# Patient Record
Sex: MALE | Race: WHITE | Employment: OTHER | URBAN - METROPOLITAN AREA
[De-identification: names, ages, dates, MRNs, and addresses within clinical notes are randomized per-mention and may not be internally consistent; named-entity substitution may affect disease eponyms.]

---

## 2017-01-27 ENCOUNTER — OFFICE VISIT (OUTPATIENT)
Dept: FAMILY MEDICINE CLINIC | Age: 77
End: 2017-01-27

## 2017-01-27 ENCOUNTER — TELEPHONE (OUTPATIENT)
Dept: FAMILY MEDICINE CLINIC | Age: 77
End: 2017-01-27

## 2017-01-27 VITALS
HEART RATE: 63 BPM | DIASTOLIC BLOOD PRESSURE: 82 MMHG | OXYGEN SATURATION: 99 % | BODY MASS INDEX: 32.79 KG/M2 | WEIGHT: 241.8 LBS | RESPIRATION RATE: 15 BRPM | SYSTOLIC BLOOD PRESSURE: 124 MMHG

## 2017-01-27 DIAGNOSIS — E11.59 TYPE 2 DIABETES MELLITUS WITH VASCULAR DISEASE (HCC): Primary | ICD-10-CM

## 2017-01-27 DIAGNOSIS — E66.9 OBESITY (BMI 30.0-34.9): ICD-10-CM

## 2017-01-27 DIAGNOSIS — E78.5 HYPERLIPIDEMIA, UNSPECIFIED HYPERLIPIDEMIA TYPE: ICD-10-CM

## 2017-01-27 DIAGNOSIS — L72.3 SEBACEOUS CYST: ICD-10-CM

## 2017-01-27 DIAGNOSIS — I10 ESSENTIAL HYPERTENSION, BENIGN: ICD-10-CM

## 2017-01-27 DIAGNOSIS — I25.110 CORONARY ARTERY DISEASE INVOLVING NATIVE CORONARY ARTERY OF NATIVE HEART WITH UNSTABLE ANGINA PECTORIS (HCC): ICD-10-CM

## 2017-01-27 LAB — HBA1C MFR BLD: 6.5 %

## 2017-01-27 PROCEDURE — 99214 OFFICE O/P EST MOD 30 MIN: CPT | Performed by: FAMILY MEDICINE

## 2017-01-27 PROCEDURE — 3288F FALL RISK ASSESSMENT DOCD: CPT | Performed by: FAMILY MEDICINE

## 2017-01-27 PROCEDURE — 83036 HEMOGLOBIN GLYCOSYLATED A1C: CPT | Performed by: FAMILY MEDICINE

## 2017-01-27 PROCEDURE — G8510 SCR DEP NEG, NO PLAN REQD: HCPCS | Performed by: FAMILY MEDICINE

## 2017-01-27 ASSESSMENT — PATIENT HEALTH QUESTIONNAIRE - PHQ9
SUM OF ALL RESPONSES TO PHQ QUESTIONS 1-9: 0
2. FEELING DOWN, DEPRESSED OR HOPELESS: 0
SUM OF ALL RESPONSES TO PHQ9 QUESTIONS 1 & 2: 0
1. LITTLE INTEREST OR PLEASURE IN DOING THINGS: 0

## 2017-02-06 ENCOUNTER — TELEPHONE (OUTPATIENT)
Dept: FAMILY MEDICINE CLINIC | Age: 77
End: 2017-02-06

## 2017-02-09 RX ORDER — AMLODIPINE BESYLATE 5 MG/1
TABLET ORAL
Qty: 90 TABLET | Refills: 0 | Status: SHIPPED | OUTPATIENT
Start: 2017-02-09 | End: 2017-05-09 | Stop reason: SDUPTHER

## 2017-02-13 DIAGNOSIS — E11.59 TYPE 2 DIABETES MELLITUS WITH VASCULAR DISEASE (HCC): ICD-10-CM

## 2017-02-21 RX ORDER — ATORVASTATIN CALCIUM 80 MG/1
TABLET, FILM COATED ORAL
Qty: 30 TABLET | Refills: 10 | Status: SHIPPED | OUTPATIENT
Start: 2017-02-21 | End: 2017-03-14 | Stop reason: SDUPTHER

## 2017-03-14 ENCOUNTER — TELEPHONE (OUTPATIENT)
Dept: CARDIOLOGY CLINIC | Age: 77
End: 2017-03-14

## 2017-03-14 RX ORDER — ATORVASTATIN CALCIUM 80 MG/1
80 TABLET, FILM COATED ORAL DAILY
Qty: 90 TABLET | Refills: 3
Start: 2017-03-14 | End: 2017-12-12 | Stop reason: SDUPTHER

## 2017-03-27 RX ORDER — LISINOPRIL 20 MG/1
TABLET ORAL
Qty: 90 TABLET | Refills: 3 | Status: SHIPPED | OUTPATIENT
Start: 2017-03-27 | End: 2017-07-13 | Stop reason: SDUPTHER

## 2017-04-06 ENCOUNTER — TELEPHONE (OUTPATIENT)
Dept: FAMILY MEDICINE CLINIC | Age: 77
End: 2017-04-06

## 2017-05-09 RX ORDER — AMLODIPINE BESYLATE 5 MG/1
TABLET ORAL
Qty: 90 TABLET | Refills: 0 | Status: SHIPPED | OUTPATIENT
Start: 2017-05-09 | End: 2017-05-18 | Stop reason: SDUPTHER

## 2017-05-18 ENCOUNTER — OFFICE VISIT (OUTPATIENT)
Dept: CARDIOLOGY CLINIC | Age: 77
End: 2017-05-18

## 2017-05-18 VITALS
DIASTOLIC BLOOD PRESSURE: 90 MMHG | HEART RATE: 70 BPM | SYSTOLIC BLOOD PRESSURE: 152 MMHG | WEIGHT: 246 LBS | BODY MASS INDEX: 33.32 KG/M2 | HEIGHT: 72 IN

## 2017-05-18 DIAGNOSIS — Z98.890 STATUS POST AAA (ABDOMINAL AORTIC ANEURYSM) REPAIR: ICD-10-CM

## 2017-05-18 DIAGNOSIS — I10 ESSENTIAL HYPERTENSION, BENIGN: Primary | ICD-10-CM

## 2017-05-18 DIAGNOSIS — Z86.79 STATUS POST AAA (ABDOMINAL AORTIC ANEURYSM) REPAIR: ICD-10-CM

## 2017-05-18 DIAGNOSIS — I50.22 CHRONIC SYSTOLIC HEART FAILURE (HCC): ICD-10-CM

## 2017-05-18 DIAGNOSIS — E78.2 MIXED HYPERLIPIDEMIA: ICD-10-CM

## 2017-05-18 DIAGNOSIS — I25.110 CORONARY ARTERY DISEASE INVOLVING NATIVE CORONARY ARTERY OF NATIVE HEART WITH UNSTABLE ANGINA PECTORIS (HCC): ICD-10-CM

## 2017-05-18 DIAGNOSIS — I77.9 CAROTID DISEASE, BILATERAL (HCC): ICD-10-CM

## 2017-05-18 PROCEDURE — 99214 OFFICE O/P EST MOD 30 MIN: CPT | Performed by: INTERNAL MEDICINE

## 2017-05-18 RX ORDER — METOPROLOL SUCCINATE 25 MG/1
25 TABLET, EXTENDED RELEASE ORAL DAILY
Qty: 30 TABLET | Refills: 3 | Status: SHIPPED | OUTPATIENT
Start: 2017-05-18 | End: 2017-07-13 | Stop reason: SDUPTHER

## 2017-05-18 RX ORDER — NITROGLYCERIN 0.4 MG/1
0.4 TABLET SUBLINGUAL EVERY 5 MIN PRN
Qty: 25 TABLET | Refills: 3 | Status: SHIPPED | OUTPATIENT
Start: 2017-05-18 | End: 2020-03-09 | Stop reason: SDUPTHER

## 2017-05-18 RX ORDER — AMLODIPINE BESYLATE 5 MG/1
5 TABLET ORAL DAILY
Qty: 90 TABLET | Refills: 3 | Status: SHIPPED | OUTPATIENT
Start: 2017-05-18 | End: 2018-05-24 | Stop reason: SDUPTHER

## 2017-06-02 ENCOUNTER — TELEPHONE (OUTPATIENT)
Dept: FAMILY MEDICINE CLINIC | Age: 77
End: 2017-06-02

## 2017-06-13 ENCOUNTER — TELEPHONE (OUTPATIENT)
Dept: FAMILY MEDICINE CLINIC | Age: 77
End: 2017-06-13

## 2017-06-17 ENCOUNTER — HOSPITAL ENCOUNTER (OUTPATIENT)
Dept: OTHER | Age: 77
Discharge: HOME | End: 2017-06-17
Attending: INTERNAL MEDICINE | Admitting: INTERNAL MEDICINE

## 2017-06-17 DIAGNOSIS — E78.2 MIXED HYPERLIPIDEMIA: ICD-10-CM

## 2017-06-17 LAB
ALBUMIN SERPL-MCNC: 4.2 G/DL (ref 3.4–5)
ALP BLD-CCNC: 111 U/L (ref 40–129)
ALT SERPL-CCNC: 16 U/L (ref 10–40)
ANION GAP SERPL CALCULATED.3IONS-SCNC: 13 MMOL/L (ref 3–16)
AST SERPL-CCNC: 16 U/L (ref 15–37)
BILIRUB SERPL-MCNC: 0.6 MG/DL (ref 0–1)
BILIRUBIN DIRECT: <0.2 MG/DL (ref 0–0.3)
BILIRUBIN, INDIRECT: NORMAL MG/DL (ref 0–1)
BUN BLDV-MCNC: 16 MG/DL (ref 7–20)
CALCIUM SERPL-MCNC: 9.5 MG/DL (ref 8.3–10.6)
CHLORIDE BLD-SCNC: 106 MMOL/L (ref 99–110)
CHOLESTEROL, TOTAL: 137 MG/DL (ref 0–199)
CO2: 26 MMOL/L (ref 21–32)
CREAT SERPL-MCNC: 1 MG/DL (ref 0.8–1.3)
GFR AFRICAN AMERICAN: >60
GFR NON-AFRICAN AMERICAN: >60
GLUCOSE BLD-MCNC: 118 MG/DL (ref 70–99)
HDLC SERPL-MCNC: 38 MG/DL (ref 40–60)
LDL CHOLESTEROL CALCULATED: 75 MG/DL
PHOSPHORUS: 3 MG/DL (ref 2.5–4.9)
POTASSIUM SERPL-SCNC: 4.5 MMOL/L (ref 3.5–5.1)
SODIUM BLD-SCNC: 145 MMOL/L (ref 136–145)
TOTAL PROTEIN: 7.3 G/DL (ref 6.4–8.2)
TRIGL SERPL-MCNC: 118 MG/DL (ref 0–150)
VLDLC SERPL CALC-MCNC: 24 MG/DL

## 2017-06-20 ENCOUNTER — TELEPHONE (OUTPATIENT)
Dept: FAMILY MEDICINE CLINIC | Age: 77
End: 2017-06-20

## 2017-07-13 ENCOUNTER — OFFICE VISIT (OUTPATIENT)
Dept: CARDIOLOGY CLINIC | Age: 77
End: 2017-07-13

## 2017-07-13 VITALS
SYSTOLIC BLOOD PRESSURE: 160 MMHG | DIASTOLIC BLOOD PRESSURE: 102 MMHG | HEIGHT: 72 IN | BODY MASS INDEX: 33.05 KG/M2 | HEART RATE: 72 BPM | OXYGEN SATURATION: 97 % | WEIGHT: 244 LBS

## 2017-07-13 DIAGNOSIS — I25.110 CORONARY ARTERY DISEASE INVOLVING NATIVE CORONARY ARTERY OF NATIVE HEART WITH UNSTABLE ANGINA PECTORIS (HCC): Primary | ICD-10-CM

## 2017-07-13 DIAGNOSIS — I10 ESSENTIAL HYPERTENSION, BENIGN: ICD-10-CM

## 2017-07-13 DIAGNOSIS — E78.2 MIXED HYPERLIPIDEMIA: ICD-10-CM

## 2017-07-13 PROCEDURE — 99214 OFFICE O/P EST MOD 30 MIN: CPT | Performed by: NURSE PRACTITIONER

## 2017-07-13 RX ORDER — LISINOPRIL 40 MG/1
40 TABLET ORAL DAILY
Qty: 90 TABLET | Refills: 3 | Status: SHIPPED | OUTPATIENT
Start: 2017-07-13 | End: 2017-11-22 | Stop reason: SDUPTHER

## 2017-07-13 RX ORDER — METOPROLOL SUCCINATE 25 MG/1
25 TABLET, EXTENDED RELEASE ORAL DAILY
Qty: 90 TABLET | Refills: 3 | Status: SHIPPED | OUTPATIENT
Start: 2017-07-13 | End: 2018-05-24 | Stop reason: SDUPTHER

## 2017-08-10 ENCOUNTER — OFFICE VISIT (OUTPATIENT)
Dept: FAMILY MEDICINE CLINIC | Age: 77
End: 2017-08-10

## 2017-08-10 VITALS
SYSTOLIC BLOOD PRESSURE: 126 MMHG | WEIGHT: 245.2 LBS | HEART RATE: 61 BPM | RESPIRATION RATE: 15 BRPM | BODY MASS INDEX: 33.26 KG/M2 | DIASTOLIC BLOOD PRESSURE: 80 MMHG | OXYGEN SATURATION: 96 %

## 2017-08-10 DIAGNOSIS — I10 ESSENTIAL HYPERTENSION, BENIGN: ICD-10-CM

## 2017-08-10 DIAGNOSIS — E11.59 TYPE 2 DIABETES MELLITUS WITH VASCULAR DISEASE (HCC): Primary | ICD-10-CM

## 2017-08-10 DIAGNOSIS — D22.9 NUMEROUS MOLES: ICD-10-CM

## 2017-08-10 DIAGNOSIS — E78.5 HYPERLIPIDEMIA, UNSPECIFIED HYPERLIPIDEMIA TYPE: ICD-10-CM

## 2017-08-10 DIAGNOSIS — E66.9 OBESITY (BMI 30.0-34.9): ICD-10-CM

## 2017-08-10 DIAGNOSIS — I25.110 CORONARY ARTERY DISEASE INVOLVING NATIVE CORONARY ARTERY OF NATIVE HEART WITH UNSTABLE ANGINA PECTORIS (HCC): ICD-10-CM

## 2017-08-10 DIAGNOSIS — R42 VESTIBULAR DIZZINESS INVOLVING LEFT INNER EAR: ICD-10-CM

## 2017-08-10 LAB — HBA1C MFR BLD: 6.9 %

## 2017-08-10 PROCEDURE — 99214 OFFICE O/P EST MOD 30 MIN: CPT | Performed by: FAMILY MEDICINE

## 2017-08-10 PROCEDURE — 83036 HEMOGLOBIN GLYCOSYLATED A1C: CPT | Performed by: FAMILY MEDICINE

## 2017-09-25 RX ORDER — METOPROLOL SUCCINATE 25 MG/1
TABLET, EXTENDED RELEASE ORAL
Qty: 30 TABLET | Refills: 11 | Status: SHIPPED | OUTPATIENT
Start: 2017-09-25 | End: 2018-02-14 | Stop reason: ALTCHOICE

## 2017-11-22 ENCOUNTER — OFFICE VISIT (OUTPATIENT)
Dept: CARDIOLOGY CLINIC | Age: 77
End: 2017-11-22

## 2017-11-22 VITALS
HEART RATE: 80 BPM | HEIGHT: 72 IN | SYSTOLIC BLOOD PRESSURE: 94 MMHG | WEIGHT: 244.2 LBS | DIASTOLIC BLOOD PRESSURE: 60 MMHG | BODY MASS INDEX: 33.08 KG/M2

## 2017-11-22 DIAGNOSIS — Z86.79 STATUS POST AAA (ABDOMINAL AORTIC ANEURYSM) REPAIR: ICD-10-CM

## 2017-11-22 DIAGNOSIS — Z98.890 STATUS POST AAA (ABDOMINAL AORTIC ANEURYSM) REPAIR: ICD-10-CM

## 2017-11-22 DIAGNOSIS — I10 ESSENTIAL HYPERTENSION, BENIGN: ICD-10-CM

## 2017-11-22 DIAGNOSIS — I25.110 CORONARY ARTERY DISEASE INVOLVING NATIVE CORONARY ARTERY OF NATIVE HEART WITH UNSTABLE ANGINA PECTORIS (HCC): Primary | ICD-10-CM

## 2017-11-22 DIAGNOSIS — I77.9 CAROTID DISEASE, BILATERAL (HCC): ICD-10-CM

## 2017-11-22 PROCEDURE — 99214 OFFICE O/P EST MOD 30 MIN: CPT | Performed by: INTERNAL MEDICINE

## 2017-11-22 PROCEDURE — 1036F TOBACCO NON-USER: CPT | Performed by: INTERNAL MEDICINE

## 2017-11-22 PROCEDURE — 1123F ACP DISCUSS/DSCN MKR DOCD: CPT | Performed by: INTERNAL MEDICINE

## 2017-11-22 PROCEDURE — G8417 CALC BMI ABV UP PARAM F/U: HCPCS | Performed by: INTERNAL MEDICINE

## 2017-11-22 PROCEDURE — G8484 FLU IMMUNIZE NO ADMIN: HCPCS | Performed by: INTERNAL MEDICINE

## 2017-11-22 PROCEDURE — 4040F PNEUMOC VAC/ADMIN/RCVD: CPT | Performed by: INTERNAL MEDICINE

## 2017-11-22 PROCEDURE — G8598 ASA/ANTIPLAT THER USED: HCPCS | Performed by: INTERNAL MEDICINE

## 2017-11-22 PROCEDURE — G8427 DOCREV CUR MEDS BY ELIG CLIN: HCPCS | Performed by: INTERNAL MEDICINE

## 2017-11-22 RX ORDER — LISINOPRIL 20 MG/1
20 TABLET ORAL DAILY
Qty: 90 TABLET | Refills: 3 | Status: SHIPPED | OUTPATIENT
Start: 2017-11-22 | End: 2018-05-24 | Stop reason: SDUPTHER

## 2017-11-22 NOTE — COMMUNICATION BODY
Disease in his brother, father, and sister. Home Medications:  Prior to Admission medications    Medication Sig Start Date End Date Taking? Authorizing Provider   metFORMIN (GLUCOPHAGE) 500 MG tablet Take 1 tablet by mouth 2 times daily (with meals) 8/10/17  Yes Morelia Fuentes MD   lisinopril (PRINIVIL;ZESTRIL) 40 MG tablet Take 1 tablet by mouth daily 7/13/17  Yes Shi Palumbo NP   metoprolol succinate (TOPROL XL) 25 MG extended release tablet Take 1 tablet by mouth daily 7/13/17  Yes Shi Palumbo NP   amLODIPine (NORVASC) 5 MG tablet Take 1 tablet by mouth daily 5/18/17  Yes Kylah Wagner MD   nitroGLYCERIN (NITROSTAT) 0.4 MG SL tablet Place 1 tablet under the tongue every 5 minutes as needed for Chest pain 5/18/17  Yes Kylah Wagner MD   atorvastatin (LIPITOR) 80 MG tablet Take 1 tablet by mouth daily 3/14/17  Yes Kylah Wagner MD   aspirin 81 MG tablet Take 81 mg by mouth daily   Yes Historical Provider, MD   clopidogrel (PLAVIX) 75 MG tablet TAKE ONE TABLET BY MOUTH DAILY 12/18/16  Yes Kylah Wagner MD   ibuprofen (ADVIL) 200 MG CAPS Take 2 capsules by mouth 3 times daily   Yes Historical Provider, MD   metoprolol succinate (TOPROL XL) 25 MG extended release tablet TAKE 1 TABLET BY MOUTH ONE TIME A DAY  9/25/17   Kylah Wagner MD   Blood Glucose Monitoring Suppl (ACCU-CHEK THOMAS PLUS) W/DEVICE KIT  10/7/15   Historical Provider, MD   ASSURE COMFORT LANCETS 30G [address] [facility]  10/7/15   Historical Provider, MD        Allergies:  Latex and Iodine     Review of Systems:   · Constitutional: there has been no unanticipated weight loss. There's been no change in energy level, sleep pattern, or activity level. · Eyes: No visual changes or diplopia. No scleral icterus. · ENT: No Headaches, hearing loss or vertigo. No mouth sores or sore throat. · Cardiovascular: Reviewed in HPI  · Respiratory: No cough or wheezing, no sputum production. No hematemesis.     · Gastrointestinal: No abdominal pain,

## 2017-11-22 NOTE — PATIENT INSTRUCTIONS
1.  Decrease lisinopril to 20 mg daily  2. Instructed to call for any change in status  3.   Follow up in few weeks with NP

## 2017-11-22 NOTE — PROGRESS NOTES
[facility]   Cardiac Consultation    Referring Provider:  [address] [place] [address]     Chief Complaint   Patient presents with    6 Month Follow-Up    Hypertension    Coronary Artery Disease    Outpatient Follow Up Note    History of Present Illness:   Ty Yang is 76 y.o.male who presents today with a history of CAD s/p CABG '02, HTN, hyperlipidemia, AAA s/p repair '09 and carotid stenosis s/p LCE '06. Today, he is doing well. He still has random episodes of chest pain, no increase in frequency or severity. He does have episodes of dizziness when he stands too quick. There is no associated shortness of breath, palpitations. Past Medical History:   has a past medical history of Acquired spondylolisthesis; Arthritis; CAD (coronary artery disease); Carotid disease, bilateral ([address] Cholelithiasis; Chronic systolic heart failure ([address] Diabetes mellitus type II; Essential hypertension; Hyperlipidemia; Obesity (BMI 30.0-34.9); Spinal stenosis of lumbar region; and Status post AAA (abdominal aortic aneurysm) repair. Surgical History:   has a past surgical history that includes Umbilical hernia repair (1987); Hip Arthroplasty (Left, 1999); Carotid endarterectomy (Left, 2006); AAA repair, endovascular (2009); Coronary artery bypass graft (2002); Vasectomy (1975); cyst removal (Right, 1997); Thyroid surgery (2012); Retinal detachment surgery (Left, 2004); Coronary angioplasty with stent (2009); Rotator cuff repair (Left, 2/24/14); Inguinal hernia repair (Left, 1985); Elbow surgery (Left, 1984); and Total knee arthroplasty (Right, 09/15/2015). Social History:   reports that he quit smoking about 46 years ago. His smoking use included Cigarettes. He has a 45.00 pack-year smoking history. He has never used smokeless tobacco. He reports that he does not drink alcohol or use drugs.      Family History:  family history includes Breast Cancer in his sister; Cancer in his brother; Heart Disease in his brother, father, and sister. Home Medications:  Prior to Admission medications    Medication Sig Start Date End Date Taking? Authorizing Provider   metFORMIN (GLUCOPHAGE) 500 MG tablet Take 1 tablet by mouth 2 times daily (with meals) 8/10/17  Yes Temitope Arnold MD   lisinopril (PRINIVIL;ZESTRIL) 40 MG tablet Take 1 tablet by mouth daily 7/13/17  Yes Gabe Craft NP   metoprolol succinate (TOPROL XL) 25 MG extended release tablet Take 1 tablet by mouth daily 7/13/17  Yes Gabe Craft NP   amLODIPine (NORVASC) 5 MG tablet Take 1 tablet by mouth daily 5/18/17  Yes Paty Zavala MD   nitroGLYCERIN (NITROSTAT) 0.4 MG SL tablet Place 1 tablet under the tongue every 5 minutes as needed for Chest pain 5/18/17  Yes Paty Zavala MD   atorvastatin (LIPITOR) 80 MG tablet Take 1 tablet by mouth daily 3/14/17  Yes Paty Zavala MD   aspirin 81 MG tablet Take 81 mg by mouth daily   Yes Historical Provider, MD   clopidogrel (PLAVIX) 75 MG tablet TAKE ONE TABLET BY MOUTH DAILY 12/18/16  Yes Paty Zavala MD   ibuprofen (ADVIL) 200 MG CAPS Take 2 capsules by mouth 3 times daily   Yes Historical Provider, MD   metoprolol succinate (TOPROL XL) 25 MG extended release tablet TAKE 1 TABLET BY MOUTH ONE TIME A DAY  9/25/17   Paty Zavala MD   Blood Glucose Monitoring Suppl (ACCU-CHEK THOMAS PLUS) W/DEVICE KIT  10/7/15   Historical Provider, MD   ASSURE COMFORT LANCETS 30G [address] [facility]  10/7/15   Historical Provider, MD        Allergies:  Latex and Iodine     Review of Systems:   · Constitutional: there has been no unanticipated weight loss. There's been no change in energy level, sleep pattern, or activity level. · Eyes: No visual changes or diplopia. No scleral icterus. · ENT: No Headaches, hearing loss or vertigo. No mouth sores or sore throat. · Cardiovascular: Reviewed in HPI  · Respiratory: No cough or wheezing, no sputum production. No hematemesis.     · Gastrointestinal: No abdominal pain, appetite loss, blood in stools. No change in bowel or bladder habits. · Genitourinary: No dysuria, trouble voiding, or hematuria. · Musculoskeletal:  No gait disturbance, weakness or joint complaints. · Integumentary: No rash or pruritis. · Neurological: No headache, diplopia, change in muscle strength, numbness or tingling. No change in gait, balance, coordination, mood, affect, memory, mentation, behavior. · Psychiatric: No anxiety, no depression. · Endocrine: No malaise, fatigue or temperature intolerance. No excessive thirst, fluid intake, or urination. No tremor. · Hematologic/Lymphatic: No abnormal bruising or bleeding, blood clots or swollen lymph nodes. · Allergic/Immunologic: No nasal congestion or hives. Physical Examination:    Vitals:    11/22/17 1255   BP: 94/60   Pulse: 80        Constitutional and General Appearance: NAD  Skin:good turgor,intact without lesions  HEENT: EOMI ,normal  Neck:no JVD    Respiratory:  · Normal excursion and expansion without use of accessory muscles  · Resp Auscultation: Normal breath sounds without dullness  Cardiovascular:  · The apical impulses not displaced  · Heart tones are crisp and normal  · Cervical veins are not engorged  · The carotid upstroke is normal in amplitude and contour without delay or bruit  · Peripheral pulses are symmetrical and full  · There is no clubbing, cyanosis of the extremities. · No edema  · Femoral Arteries: 2+ and equal  · Pedal Pulses: 2+ and equal   Abdomen:  · No masses or tenderness  · Liver/Spleen: No Abnormalities Noted  Neurological/Psychiatric:  · Alert and oriented in all spheres  · Moves all extremities well  · Exhibits normal gait balance and coordination  · No abnormalities of mood, affect, memory, mentation, or behavior are noted      Assessment/Plan:    1. CAD--CAD s/p CABG '02   8/15--myoview-small sized lateral fixed defect c/w infarction in the territroy of the mid and distal LCX-stable.  Will add toprol xl 25 mg

## 2017-12-04 ENCOUNTER — TELEPHONE (OUTPATIENT)
Dept: FAMILY MEDICINE CLINIC | Age: 77
End: 2017-12-04

## 2017-12-04 DIAGNOSIS — D49.2 EAR TUMORS: Primary | ICD-10-CM

## 2017-12-04 NOTE — TELEPHONE ENCOUNTER
Please place a referral in Epic and return this encounter to the front office referral team for processing.     REFER TO: Issa Perla SPECIALITY:  Dermatologist  [phone] fax [phone]  DATE OF SERVICE: 09/08/2017 please back date  REASON FOR VISIT:  Dx code D 49.2 L 81.4  D 18.01 L 57.8 D 48.5 L 57.0 L 82.1 Z 12.83  PATIENT INSURANCE:  [address] [employer]

## 2017-12-06 ENCOUNTER — TELEPHONE (OUTPATIENT)
Dept: CARDIOLOGY CLINIC | Age: 77
End: 2017-12-06

## 2017-12-06 VITALS — SYSTOLIC BLOOD PRESSURE: 138 MMHG | DIASTOLIC BLOOD PRESSURE: 76 MMHG

## 2017-12-12 ENCOUNTER — TELEPHONE (OUTPATIENT)
Dept: FAMILY MEDICINE CLINIC | Age: 77
End: 2017-12-12

## 2017-12-12 RX ORDER — ATORVASTATIN CALCIUM 80 MG/1
80 TABLET, FILM COATED ORAL DAILY
Qty: 90 TABLET | Refills: 3 | Status: SHIPPED | OUTPATIENT
Start: 2017-12-12 | End: 2018-05-24 | Stop reason: SDUPTHER

## 2017-12-18 ENCOUNTER — TELEPHONE (OUTPATIENT)
Dept: FAMILY MEDICINE CLINIC | Age: 77
End: 2017-12-18

## 2017-12-18 DIAGNOSIS — L98.9 SKIN LESION: Primary | ICD-10-CM

## 2017-12-18 NOTE — TELEPHONE ENCOUNTER
Looks like there was a referral placed. But not particularly for that doctor. He may be another doc in the group. Sada Reagan Referral says in progress. So I think it is still processing.

## 2017-12-18 NOTE — TELEPHONE ENCOUNTER
Veena Sparrow from the dermatologist office [phone] called and it checking on the referral to [place] Date of service 09/08/2017.  Please advise

## 2017-12-26 RX ORDER — CLOPIDOGREL BISULFATE 75 MG/1
TABLET ORAL
Qty: 90 TABLET | Refills: 3 | Status: SHIPPED | OUTPATIENT
Start: 2017-12-26 | End: 2018-05-24 | Stop reason: SDUPTHER

## 2018-02-07 ENCOUNTER — TELEPHONE (OUTPATIENT)
Dept: FAMILY MEDICINE CLINIC | Age: 78
End: 2018-02-07

## 2018-02-14 ENCOUNTER — OFFICE VISIT (OUTPATIENT)
Dept: FAMILY MEDICINE CLINIC | Age: 78
End: 2018-02-14

## 2018-02-14 VITALS
BODY MASS INDEX: 32.91 KG/M2 | HEART RATE: 105 BPM | DIASTOLIC BLOOD PRESSURE: 80 MMHG | OXYGEN SATURATION: 94 % | RESPIRATION RATE: 15 BRPM | SYSTOLIC BLOOD PRESSURE: 124 MMHG | WEIGHT: 243 LBS | HEIGHT: 72 IN

## 2018-02-14 DIAGNOSIS — L08.9 INFECTED SEBACEOUS CYST: Primary | ICD-10-CM

## 2018-02-14 DIAGNOSIS — L72.3 INFECTED SEBACEOUS CYST: Primary | ICD-10-CM

## 2018-02-14 PROCEDURE — G8417 CALC BMI ABV UP PARAM F/U: HCPCS | Performed by: FAMILY MEDICINE

## 2018-02-14 PROCEDURE — G8484 FLU IMMUNIZE NO ADMIN: HCPCS | Performed by: FAMILY MEDICINE

## 2018-02-14 PROCEDURE — G8598 ASA/ANTIPLAT THER USED: HCPCS | Performed by: FAMILY MEDICINE

## 2018-02-14 PROCEDURE — G8427 DOCREV CUR MEDS BY ELIG CLIN: HCPCS | Performed by: FAMILY MEDICINE

## 2018-02-14 PROCEDURE — 99214 OFFICE O/P EST MOD 30 MIN: CPT | Performed by: FAMILY MEDICINE

## 2018-02-14 PROCEDURE — 10060 I&D ABSCESS SIMPLE/SINGLE: CPT | Performed by: FAMILY MEDICINE

## 2018-02-14 PROCEDURE — 4040F PNEUMOC VAC/ADMIN/RCVD: CPT | Performed by: FAMILY MEDICINE

## 2018-02-14 PROCEDURE — 1036F TOBACCO NON-USER: CPT | Performed by: FAMILY MEDICINE

## 2018-02-14 PROCEDURE — 1123F ACP DISCUSS/DSCN MKR DOCD: CPT | Performed by: FAMILY MEDICINE

## 2018-02-14 RX ORDER — LIDOCAINE HYDROCHLORIDE 10 MG/ML
3 INJECTION, SOLUTION EPIDURAL; INFILTRATION; INTRACAUDAL; PERINEURAL ONCE
Status: DISCONTINUED | OUTPATIENT
Start: 2018-02-14 | End: 2020-11-24 | Stop reason: HOSPADM

## 2018-02-14 RX ORDER — CLINDAMYCIN HYDROCHLORIDE 300 MG/1
300 CAPSULE ORAL 3 TIMES DAILY
Qty: 30 CAPSULE | Refills: 0 | Status: SHIPPED | OUTPATIENT
Start: 2018-02-14 | End: 2018-02-24

## 2018-02-14 NOTE — PROGRESS NOTES
Sig Dispense Refill    clindamycin (CLEOCIN) 300 MG capsule Take 1 capsule by mouth 3 times daily for 10 days 30 capsule 0    clopidogrel (PLAVIX) 75 MG tablet TAKE 1 TABLET BY MOUTH DAILY 90 tablet 3    atorvastatin (LIPITOR) 80 MG tablet Take 1 tablet by mouth daily 90 tablet 3    lisinopril (PRINIVIL;ZESTRIL) 20 MG tablet Take 1 tablet by mouth daily 90 tablet 3    metFORMIN (GLUCOPHAGE) 500 MG tablet Take 1 tablet by mouth 2 times daily (with meals) 60 tablet 11    metoprolol succinate (TOPROL XL) 25 MG extended release tablet Take 1 tablet by mouth daily 90 tablet 3    amLODIPine (NORVASC) 5 MG tablet Take 1 tablet by mouth daily 90 tablet 3    nitroGLYCERIN (NITROSTAT) 0.4 MG SL tablet Place 1 tablet under the tongue every 5 minutes as needed for Chest pain 25 tablet 3    aspirin 81 MG tablet Take 81 mg by mouth daily      ibuprofen (ADVIL) 200 MG CAPS Take 2 capsules by mouth 3 times daily      Blood Glucose Monitoring Suppl (ACCU-CHEK THOMAS PLUS) W/DEVICE KIT       ASSURE COMFORT LANCETS 30G [facility]        Current Facility-Administered Medications   Medication Dose Route Frequency Provider Last Rate Last Dose    lidocaine PF 1 % injection 3 mL  3 mL Intradermal Once Jordyn Necessary, MD           Allergies   Allergen Reactions    Latex Swelling and Rash     Blisters. Pt reacts to a variety of products, including balloons, rubber bands, gloves, foam pillows, adhesive tape, ACE bandages.  Iodine        Review of Systems   No vomiting, no SOB    Vitals:  /80 (Site: Left Arm, Position: Sitting, Cuff Size: Large Adult)   Pulse 105   Resp 15   Ht 6' (1.829 m)   Wt 243 lb (110.2 kg)   SpO2 94%   BMI 32.96 kg/m²     Physical Exam   General:  Well-appearing, NAD, alert, non-toxic  HEENT:  Normocephalic, atraumatic. CHEST/LUNGS: No dyspnea  EXTREMETIES: Normal movement of all extremities. No edema. No joint swelling.   SKIN:  +erythematous nodule on upper back on R side lateral to spine, PM

## 2018-02-16 ENCOUNTER — OFFICE VISIT (OUTPATIENT)
Dept: SURGERY | Age: 78
End: 2018-02-16

## 2018-02-16 ENCOUNTER — SURG/PROC ORDERS (OUTPATIENT)
Dept: SURGERY | Age: 78
End: 2018-02-16

## 2018-02-16 VITALS
BODY MASS INDEX: 33.18 KG/M2 | WEIGHT: 245 LBS | HEIGHT: 72 IN | SYSTOLIC BLOOD PRESSURE: 150 MMHG | DIASTOLIC BLOOD PRESSURE: 90 MMHG

## 2018-02-16 DIAGNOSIS — L08.9 INFECTED SEBACEOUS CYST: Primary | ICD-10-CM

## 2018-02-16 DIAGNOSIS — L72.3 INFECTED SEBACEOUS CYST: Primary | ICD-10-CM

## 2018-02-16 PROCEDURE — G8417 CALC BMI ABV UP PARAM F/U: HCPCS | Performed by: SURGERY

## 2018-02-16 PROCEDURE — G8427 DOCREV CUR MEDS BY ELIG CLIN: HCPCS | Performed by: SURGERY

## 2018-02-16 PROCEDURE — G8598 ASA/ANTIPLAT THER USED: HCPCS | Performed by: SURGERY

## 2018-02-16 PROCEDURE — 1036F TOBACCO NON-USER: CPT | Performed by: SURGERY

## 2018-02-16 PROCEDURE — 99203 OFFICE O/P NEW LOW 30 MIN: CPT | Performed by: SURGERY

## 2018-02-16 PROCEDURE — 1123F ACP DISCUSS/DSCN MKR DOCD: CPT | Performed by: SURGERY

## 2018-02-16 PROCEDURE — 4040F PNEUMOC VAC/ADMIN/RCVD: CPT | Performed by: SURGERY

## 2018-02-16 PROCEDURE — G8484 FLU IMMUNIZE NO ADMIN: HCPCS | Performed by: SURGERY

## 2018-02-16 RX ORDER — SODIUM CHLORIDE 0.9 % (FLUSH) 0.9 %
10 SYRINGE (ML) INJECTION EVERY 12 HOURS SCHEDULED
Status: CANCELLED | OUTPATIENT
Start: 2018-02-16

## 2018-02-16 RX ORDER — SODIUM CHLORIDE 0.9 % (FLUSH) 0.9 %
10 SYRINGE (ML) INJECTION PRN
Status: CANCELLED | OUTPATIENT
Start: 2018-02-16

## 2018-02-16 ASSESSMENT — ENCOUNTER SYMPTOMS
EYE DISCHARGE: 0
APNEA: 0
BACK PAIN: 0
COLOR CHANGE: 0
ABDOMINAL DISTENTION: 0
EYE ITCHING: 0
CHEST TIGHTNESS: 0
ABDOMINAL PAIN: 0

## 2018-02-16 NOTE — PROGRESS NOTES
[facility]  SUBJECTIVE:    Chief Complaint: Infected sebaceous cyst    Shannan Guillermo   1940   76 y.o. male is referred by his primary care physician Dr. Shasha Burton with an infected sebaceous cyst on his back. He's had it for several years but only recently become painful. He presented to his primary care physician was incised and drained in the office and started on antibiotics. He does feel better and the drainage is decreasing. He denies fevers chills nausea vomiting or other complaints. He is not a great historian and is not able to provide much information about his medical history. By chart review he's on Plavix for coronary artery disease. Review of Systems   Constitutional: Negative for activity change and appetite change. HENT: Negative for congestion and dental problem. Eyes: Negative for discharge and itching. Respiratory: Negative for apnea and chest tightness. Cardiovascular: Negative for chest pain and leg swelling. Gastrointestinal: Negative for abdominal distention and abdominal pain. Endocrine: Negative for cold intolerance and heat intolerance. Genitourinary: Negative for difficulty urinating and dysuria. Musculoskeletal: Negative for arthralgias and back pain. Skin: Negative for color change and pallor. Allergic/Immunologic: Negative for environmental allergies and food allergies. Neurological: Negative for dizziness and facial asymmetry. Hematological: Negative for adenopathy. Does not bruise/bleed easily. Psychiatric/Behavioral: Negative for agitation and behavioral problems.        Past Medical History:   Diagnosis Date    Acquired spondylolisthesis 1/29/2014    Arthritis     diffuse    CAD (coronary artery disease)     S/P PCI and CABG    Carot[facility], [facility] 2/3/2014    Cholelithiasis 11/10/2014    Chronic systolic heart failure ([facility]     Diabetes mellitus type II     Essential hypertension

## 2018-03-01 ENCOUNTER — TELEPHONE (OUTPATIENT)
Dept: SURGERY | Age: 78
End: 2018-03-01

## 2018-03-06 ENCOUNTER — HOSPITAL ENCOUNTER (OUTPATIENT)
Dept: SURGERY | Age: 78
Discharge: HOME | End: 2018-03-06
Attending: SURGERY | Admitting: SURGERY

## 2018-03-06 VITALS
DIASTOLIC BLOOD PRESSURE: 60 MMHG | WEIGHT: 240 LBS | TEMPERATURE: 97.6 F | BODY MASS INDEX: 32.51 KG/M2 | SYSTOLIC BLOOD PRESSURE: 103 MMHG | HEART RATE: 66 BPM | OXYGEN SATURATION: 96 % | HEIGHT: 72 IN | RESPIRATION RATE: 16 BRPM

## 2018-03-06 DIAGNOSIS — L72.3 SEBACEOUS CYST: Primary | ICD-10-CM

## 2018-03-06 LAB
ANION GAP SERPL CALCULATED.3IONS-SCNC: 10 MMOL/L (ref 3–16)
BUN BLDV-MCNC: 20 MG/DL (ref 7–20)
CALCIUM SERPL-MCNC: 9.7 MG/DL (ref 8.3–10.6)
CHLORIDE BLD-SCNC: 102 MMOL/L (ref 99–110)
CO2: 27 MMOL/L (ref 21–32)
CREAT SERPL-MCNC: 1 MG/DL (ref 0.8–1.3)
GFR AFRICAN AMERICAN: >60
GFR NON-AFRICAN AMERICAN: >60
GLUCOSE BLD-MCNC: 116 MG/DL (ref 70–99)
GLUCOSE BLD-MCNC: 136 MG/DL (ref 70–99)
GLUCOSE BLD-MCNC: 146 MG/DL (ref 70–99)
HCT VFR BLD CALC: 44.1 % (ref 40.5–52.5)
HEMOGLOBIN: 15 G/DL (ref 13.5–17.5)
MCH RBC QN AUTO: 32.2 PG (ref 26–34)
MCHC RBC AUTO-ENTMCNC: 33.9 G/DL (ref 31–36)
MCV RBC AUTO: 94.9 FL (ref 80–100)
PDW BLD-RTO: 14.2 % (ref 12.4–15.4)
PERFORMED ON: ABNORMAL
PERFORMED ON: ABNORMAL
PLATELET # BLD: 162 K/UL (ref 135–450)
PMV BLD AUTO: 9 FL (ref 5–10.5)
POTASSIUM SERPL-SCNC: 4.3 MMOL/L (ref 3.5–5.1)
RBC # BLD: 4.64 M/UL (ref 4.2–5.9)
SODIUM BLD-SCNC: 139 MMOL/L (ref 136–145)
WBC # BLD: 5.3 K/UL (ref 4–11)

## 2018-03-06 PROCEDURE — 11403 EXC TR-EXT B9+MARG 2.1-3CM: CPT | Performed by: SURGERY

## 2018-03-06 PROCEDURE — 93010 ELECTROCARDIOGRAM REPORT: CPT | Performed by: INTERNAL MEDICINE

## 2018-03-06 PROCEDURE — 12032 INTMD RPR S/A/T/EXT 2.6-7.5: CPT | Performed by: SURGERY

## 2018-03-06 RX ORDER — ONDANSETRON 2 MG/ML
4 INJECTION INTRAMUSCULAR; INTRAVENOUS
Status: ACTIVE | OUTPATIENT
Start: 2018-03-06 | End: 2018-03-06

## 2018-03-06 RX ORDER — HYDROMORPHONE HCL 110MG/55ML
0.25 PATIENT CONTROLLED ANALGESIA SYRINGE INTRAVENOUS EVERY 5 MIN PRN
Status: DISCONTINUED | OUTPATIENT
Start: 2018-03-06 | End: 2018-03-07 | Stop reason: HOSPADM

## 2018-03-06 RX ORDER — CEFAZOLIN SODIUM 2 G/100ML
2 INJECTION, SOLUTION INTRAVENOUS
Status: COMPLETED | OUTPATIENT
Start: 2018-03-06 | End: 2018-03-06

## 2018-03-06 RX ORDER — HYDRALAZINE HYDROCHLORIDE 20 MG/ML
5 INJECTION INTRAMUSCULAR; INTRAVENOUS EVERY 10 MIN PRN
Status: DISCONTINUED | OUTPATIENT
Start: 2018-03-06 | End: 2018-03-07 | Stop reason: HOSPADM

## 2018-03-06 RX ORDER — LABETALOL HYDROCHLORIDE 5 MG/ML
5 INJECTION, SOLUTION INTRAVENOUS EVERY 10 MIN PRN
Status: DISCONTINUED | OUTPATIENT
Start: 2018-03-06 | End: 2018-03-07 | Stop reason: HOSPADM

## 2018-03-06 RX ORDER — SODIUM CHLORIDE 0.9 % (FLUSH) 0.9 %
10 SYRINGE (ML) INJECTION EVERY 12 HOURS SCHEDULED
Status: DISCONTINUED | OUTPATIENT
Start: 2018-03-06 | End: 2018-03-07 | Stop reason: HOSPADM

## 2018-03-06 RX ORDER — OXYCODONE HYDROCHLORIDE AND ACETAMINOPHEN 5; 325 MG/1; MG/1
1 TABLET ORAL
Status: ACTIVE | OUTPATIENT
Start: 2018-03-06 | End: 2018-03-06

## 2018-03-06 RX ORDER — CEFAZOLIN SODIUM 2 G/100ML
INJECTION, SOLUTION INTRAVENOUS
Status: COMPLETED
Start: 2018-03-06 | End: 2018-03-06

## 2018-03-06 RX ORDER — OXYCODONE HYDROCHLORIDE 5 MG/1
5 TABLET ORAL EVERY 4 HOURS PRN
Qty: 30 TABLET | Refills: 0 | Status: SHIPPED | OUTPATIENT
Start: 2018-03-06 | End: 2018-03-13

## 2018-03-06 RX ORDER — SODIUM CHLORIDE 0.9 % (FLUSH) 0.9 %
10 SYRINGE (ML) INJECTION PRN
Status: DISCONTINUED | OUTPATIENT
Start: 2018-03-06 | End: 2018-03-07 | Stop reason: HOSPADM

## 2018-03-06 RX ORDER — SODIUM CHLORIDE 9 MG/ML
INJECTION, SOLUTION INTRAVENOUS
Status: DISPENSED
Start: 2018-03-06 | End: 2018-03-06

## 2018-03-06 RX ADMIN — CEFAZOLIN SODIUM 2 G: 2 INJECTION, SOLUTION INTRAVENOUS at 07:22

## 2018-03-06 ASSESSMENT — PAIN SCALES - GENERAL
PAINLEVEL_OUTOF10: 0
PAINLEVEL_OUTOF10: 0

## 2018-03-06 ASSESSMENT — PAIN - FUNCTIONAL ASSESSMENT: PAIN_FUNCTIONAL_ASSESSMENT: 0-10

## 2018-03-06 NOTE — PROGRESS NOTES
Pt dressed, IV removed. Incision noted to be oozing moderate amount of blood. Dr Maday Dsouza notified, in to see pt, wants pt to apply 4x4 and oressure at home and follow up at the end of week instead of 2 week. Pt agreeable.

## 2018-03-06 NOTE — PROGRESS NOTES
Patient received to PACU in stable condition. VSS. Denies pain. Incision to back clean dry and intact. Will continue to monitor.

## 2018-03-06 NOTE — PROGRESS NOTES
Teaching / education initiated regarding perioperative experience, expectations, and pain management during stay. Patient verbalized understanding.  Shanika Cordero

## 2018-03-06 NOTE — ANESTHESIA POST-OP
Anesthesia Post-op Note    Patient: Jake Patel  MRN: [id number]  YOB: 1940  Date of evaluation: 3/6/2018  Time:  1:39 PM     Procedure(s) Performed:     Last Vitals: /60   Pulse 66   Temp 97.6 °F (36.4 °C) (Temporal)   Resp 16   Ht 6' (1.829 m)   Wt 240 lb (108.9 kg)   SpO2 96%   BMI 32.55 kg/m²     Charan Phase I: Charan Score: 10    Charan Phase II: Charan Score: 10    Anesthesia Post Evaluation    Final anesthesia type: MAC  Patient location during evaluation: PACU  Patient participation: complete - patient participated  Level of consciousness: awake and alert  Airway patency: patent  Nausea & Vomiting: no nausea and no vomiting  Complications: no  Cardiovascular status: hemodynamically stable  Respiratory status: acceptable  Hydration status: stable        Marco A Cortez MD  1:39 PM

## 2018-03-06 NOTE — H&P
[facility]    I have reviewed the history and physical and examined the patient and find no relevant changes. I have reviewed with the patient and/or family the risks, benefits, and alternatives to the procedure. [address]  Carrol Tee MD, FACS  3/6/2018  7:18 AM

## 2018-03-07 LAB
EKG ATRIAL RATE: 60 BPM
EKG DIAGNOSIS: NORMAL
EKG Q-T INTERVAL: 492 MS
EKG QRS DURATION: 138 MS
EKG QTC CALCULATION (BAZETT): 499 MS
EKG R AXIS: -12 DEGREES
EKG T AXIS: 24 DEGREES
EKG VENTRICULAR RATE: 62 BPM

## 2018-03-12 ENCOUNTER — OFFICE VISIT (OUTPATIENT)
Dept: SURGERY | Age: 78
End: 2018-03-12

## 2018-03-12 VITALS — DIASTOLIC BLOOD PRESSURE: 90 MMHG | SYSTOLIC BLOOD PRESSURE: 140 MMHG | WEIGHT: 240 LBS | BODY MASS INDEX: 32.55 KG/M2

## 2018-03-12 DIAGNOSIS — Z09 SURGERY FOLLOW-UP: Primary | ICD-10-CM

## 2018-03-12 PROCEDURE — 99024 POSTOP FOLLOW-UP VISIT: CPT | Performed by: SURGERY

## 2018-03-12 NOTE — PATIENT INSTRUCTIONS
1. Bulge under her incision is likely a resolving hematoma. Should continue to resolve and will not need additional treatment over the next few weeks. If it becomes red tender or grows in size he will return to office for further evaluation  2. Skin affixed mesh is over the incision to help reinforce the incision and prevent dehiscence. He can slowly be trimmed as the edges again to fray and does not need to be simply removed. It will fall off on its own over the next few weeks  3. Avoid heavy lifting for the next 2-4 weeks if possible  4.  Follow up with general surgery office as needed

## 2018-03-12 NOTE — PROGRESS NOTES
mL  3 mL Intradermal Once Iggy Bennett MD          Allergies   Allergen Reactions    Latex Swelling and Rash     Blisters. Pt reacts to a variety of products, including balloons, rubber bands, gloves, foam pillows, adhesive tape, ACE bandages.  Iodine      Contrast,        Review of Systems:  Review of systems performed and negative with the exception of the above findings    OBJECTIVE:  BP (!) 140/90   Wt 240 lb (108.9 kg)   BMI 32.55 kg/m²      Physical Exam:  General appearance: alert, appears stated age, cooperative and no distress  Skin/wound: Incision clean dry and intact. Subtle swelling under her incision is likely a resolving hematoma. There are no signs of infection and risks of opening the wound to evacuate hematoma outweigh the benefits, especially as it is asymptomatic    No results found. Pathology:  FINAL DIAGNOSIS:    Back cyst, excision:  - Benign follicular cyst with evidence of rupture. - Negative for malignancy. BUCCA/BUCCA      Assessment:  excision of sebaceous cyst on back on March 6, 2018. Plan:  1.  Bulge under her incision is likely a resolving hematoma. Should continue to resolve and will not need additional treatment over the next few weeks. If it becomes red tender or grows in size he will return to office for further evaluation  2. Skin affixed mesh is over the incision to help reinforce the incision and prevent dehiscence. He can slowly be trimmed as the edges again to fray and does not need to be simply removed. It will fall off on its own over the next few weeks  3. Avoid heavy lifting for the next 2-4 weeks if possible  4. Follow up with general surgery office as needed    Forfranklin Gordon.  Jonh Wolfe MD, FACS  3/12/2018  10:03 AM

## 2018-03-26 ENCOUNTER — TELEPHONE (OUTPATIENT)
Dept: FAMILY MEDICINE CLINIC | Age: 78
End: 2018-03-26

## 2018-04-02 ENCOUNTER — OFFICE VISIT (OUTPATIENT)
Dept: FAMILY MEDICINE CLINIC | Age: 78
End: 2018-04-02

## 2018-04-02 VITALS
OXYGEN SATURATION: 94 % | DIASTOLIC BLOOD PRESSURE: 90 MMHG | BODY MASS INDEX: 33.32 KG/M2 | HEART RATE: 64 BPM | WEIGHT: 246 LBS | SYSTOLIC BLOOD PRESSURE: 146 MMHG | RESPIRATION RATE: 16 BRPM | HEIGHT: 72 IN

## 2018-04-02 DIAGNOSIS — E11.9 TYPE 2 DIABETES MELLITUS WITHOUT COMPLICATION, WITHOUT LONG-TERM CURRENT USE OF INSULIN (HCC): ICD-10-CM

## 2018-04-02 DIAGNOSIS — I10 HYPERTENSION, ESSENTIAL: ICD-10-CM

## 2018-04-02 DIAGNOSIS — I10 HYPERTENSION, ESSENTIAL: Primary | ICD-10-CM

## 2018-04-02 DIAGNOSIS — L08.9 INFECTED SEBACEOUS CYST: ICD-10-CM

## 2018-04-02 DIAGNOSIS — L72.3 INFECTED SEBACEOUS CYST: ICD-10-CM

## 2018-04-02 LAB
A/G RATIO: 1.9 (ref 1.1–2.2)
ALBUMIN SERPL-MCNC: 4.6 G/DL (ref 3.4–5)
ALP BLD-CCNC: 116 U/L (ref 40–129)
ALT SERPL-CCNC: 21 U/L (ref 10–40)
ANION GAP SERPL CALCULATED.3IONS-SCNC: 13 MMOL/L (ref 3–16)
AST SERPL-CCNC: 19 U/L (ref 15–37)
BILIRUB SERPL-MCNC: 0.5 MG/DL (ref 0–1)
BUN BLDV-MCNC: 12 MG/DL (ref 7–20)
CALCIUM SERPL-MCNC: 9 MG/DL (ref 8.3–10.6)
CHLORIDE BLD-SCNC: 103 MMOL/L (ref 99–110)
CO2: 26 MMOL/L (ref 21–32)
CREAT SERPL-MCNC: 0.8 MG/DL (ref 0.8–1.3)
GFR AFRICAN AMERICAN: >60
GFR NON-AFRICAN AMERICAN: >60
GLOBULIN: 2.4 G/DL
GLUCOSE BLD-MCNC: 133 MG/DL (ref 70–99)
POTASSIUM SERPL-SCNC: 4.2 MMOL/L (ref 3.5–5.1)
SODIUM BLD-SCNC: 142 MMOL/L (ref 136–145)
TOTAL PROTEIN: 7 G/DL (ref 6.4–8.2)

## 2018-04-02 PROCEDURE — G8598 ASA/ANTIPLAT THER USED: HCPCS | Performed by: FAMILY MEDICINE

## 2018-04-02 PROCEDURE — 1036F TOBACCO NON-USER: CPT | Performed by: FAMILY MEDICINE

## 2018-04-02 PROCEDURE — 1123F ACP DISCUSS/DSCN MKR DOCD: CPT | Performed by: FAMILY MEDICINE

## 2018-04-02 PROCEDURE — G8417 CALC BMI ABV UP PARAM F/U: HCPCS | Performed by: FAMILY MEDICINE

## 2018-04-02 PROCEDURE — 4040F PNEUMOC VAC/ADMIN/RCVD: CPT | Performed by: FAMILY MEDICINE

## 2018-04-02 PROCEDURE — G8427 DOCREV CUR MEDS BY ELIG CLIN: HCPCS | Performed by: FAMILY MEDICINE

## 2018-04-02 PROCEDURE — 99214 OFFICE O/P EST MOD 30 MIN: CPT | Performed by: FAMILY MEDICINE

## 2018-04-02 ASSESSMENT — PATIENT HEALTH QUESTIONNAIRE - PHQ9
2. FEELING DOWN, DEPRESSED OR HOPELESS: 0
SUM OF ALL RESPONSES TO PHQ QUESTIONS 1-9: 0
1. LITTLE INTEREST OR PLEASURE IN DOING THINGS: 0
SUM OF ALL RESPONSES TO PHQ9 QUESTIONS 1 & 2: 0

## 2018-04-03 DIAGNOSIS — E11.59 TYPE 2 DIABETES MELLITUS WITH VASCULAR DISEASE (HCC): ICD-10-CM

## 2018-04-03 LAB
ESTIMATED AVERAGE GLUCOSE: 159.9 MG/DL
HBA1C MFR BLD: 7.2 %

## 2018-04-16 ENCOUNTER — OFFICE VISIT (OUTPATIENT)
Dept: FAMILY MEDICINE CLINIC | Age: 78
End: 2018-04-16

## 2018-04-16 VITALS
SYSTOLIC BLOOD PRESSURE: 142 MMHG | HEART RATE: 74 BPM | DIASTOLIC BLOOD PRESSURE: 92 MMHG | BODY MASS INDEX: 33.18 KG/M2 | RESPIRATION RATE: 15 BRPM | HEIGHT: 72 IN | OXYGEN SATURATION: 96 % | WEIGHT: 245 LBS

## 2018-04-16 DIAGNOSIS — I10 HYPERTENSION, ESSENTIAL: Primary | ICD-10-CM

## 2018-04-16 DIAGNOSIS — Z23 NEED FOR TDAP VACCINATION: ICD-10-CM

## 2018-04-16 PROCEDURE — 90715 TDAP VACCINE 7 YRS/> IM: CPT | Performed by: FAMILY MEDICINE

## 2018-04-16 PROCEDURE — 99213 OFFICE O/P EST LOW 20 MIN: CPT | Performed by: FAMILY MEDICINE

## 2018-04-16 PROCEDURE — G8417 CALC BMI ABV UP PARAM F/U: HCPCS | Performed by: FAMILY MEDICINE

## 2018-04-16 PROCEDURE — 4040F PNEUMOC VAC/ADMIN/RCVD: CPT | Performed by: FAMILY MEDICINE

## 2018-04-16 PROCEDURE — 1036F TOBACCO NON-USER: CPT | Performed by: FAMILY MEDICINE

## 2018-04-16 PROCEDURE — 90471 IMMUNIZATION ADMIN: CPT | Performed by: FAMILY MEDICINE

## 2018-04-16 PROCEDURE — G8427 DOCREV CUR MEDS BY ELIG CLIN: HCPCS | Performed by: FAMILY MEDICINE

## 2018-04-16 PROCEDURE — G8598 ASA/ANTIPLAT THER USED: HCPCS | Performed by: FAMILY MEDICINE

## 2018-04-16 PROCEDURE — 1123F ACP DISCUSS/DSCN MKR DOCD: CPT | Performed by: FAMILY MEDICINE

## 2018-04-16 RX ORDER — HYDROCHLOROTHIAZIDE 25 MG/1
25 TABLET ORAL DAILY
Qty: 30 TABLET | Refills: 5 | Status: SHIPPED | OUTPATIENT
Start: 2018-04-16 | End: 2018-05-24 | Stop reason: SDUPTHER

## 2018-05-23 DIAGNOSIS — E11.59 TYPE 2 DIABETES MELLITUS WITH VASCULAR DISEASE (HCC): ICD-10-CM

## 2018-05-23 DIAGNOSIS — I10 HYPERTENSION, ESSENTIAL: ICD-10-CM

## 2018-05-24 RX ORDER — BLOOD-GLUCOSE METER
EACH MISCELLANEOUS
Qty: 1 DEVICE | Refills: 0 | Status: SHIPPED | OUTPATIENT
Start: 2018-05-24 | End: 2021-03-22 | Stop reason: SDUPTHER

## 2018-05-24 RX ORDER — GLUCOSAM/CHON-MSM1/C/MANG/BOSW 500-416.6
TABLET ORAL
Qty: 100 EACH | Refills: 5 | Status: SHIPPED | OUTPATIENT
Start: 2018-05-24 | End: 2018-07-31 | Stop reason: SDUPTHER

## 2018-05-24 RX ORDER — LISINOPRIL 20 MG/1
20 TABLET ORAL DAILY
Qty: 90 TABLET | Refills: 1 | Status: SHIPPED | OUTPATIENT
Start: 2018-05-24 | End: 2018-11-26 | Stop reason: SDUPTHER

## 2018-05-24 RX ORDER — METOPROLOL SUCCINATE 25 MG/1
25 TABLET, EXTENDED RELEASE ORAL NIGHTLY
Qty: 90 TABLET | Refills: 1 | Status: SHIPPED | OUTPATIENT
Start: 2018-05-24 | End: 2019-04-03 | Stop reason: SDUPTHER

## 2018-05-24 RX ORDER — AMLODIPINE BESYLATE 5 MG/1
5 TABLET ORAL DAILY
Qty: 90 TABLET | Refills: 1 | Status: SHIPPED | OUTPATIENT
Start: 2018-05-24 | End: 2018-07-30 | Stop reason: SDUPTHER

## 2018-05-24 RX ORDER — ATORVASTATIN CALCIUM 80 MG/1
80 TABLET, FILM COATED ORAL DAILY
Qty: 90 TABLET | Refills: 1 | Status: SHIPPED | OUTPATIENT
Start: 2018-05-24 | End: 2018-11-26 | Stop reason: SDUPTHER

## 2018-05-24 RX ORDER — HYDROCHLOROTHIAZIDE 25 MG/1
25 TABLET ORAL DAILY
Qty: 30 TABLET | Refills: 5 | Status: SHIPPED | OUTPATIENT
Start: 2018-05-24 | End: 2018-06-18 | Stop reason: SDUPTHER

## 2018-05-24 RX ORDER — CLOPIDOGREL BISULFATE 75 MG/1
TABLET ORAL
Qty: 90 TABLET | Refills: 1 | Status: SHIPPED | OUTPATIENT
Start: 2018-05-24 | End: 2018-10-11 | Stop reason: SDUPTHER

## 2018-06-18 ENCOUNTER — OFFICE VISIT (OUTPATIENT)
Dept: FAMILY MEDICINE CLINIC | Age: 78
End: 2018-06-18

## 2018-06-18 VITALS
SYSTOLIC BLOOD PRESSURE: 126 MMHG | TEMPERATURE: 97.9 F | WEIGHT: 238 LBS | OXYGEN SATURATION: 97 % | BODY MASS INDEX: 32.28 KG/M2 | DIASTOLIC BLOOD PRESSURE: 82 MMHG | HEART RATE: 61 BPM

## 2018-06-18 DIAGNOSIS — I10 HYPERTENSION, ESSENTIAL: ICD-10-CM

## 2018-06-18 DIAGNOSIS — E11.59 TYPE 2 DIABETES MELLITUS WITH VASCULAR DISEASE (HCC): ICD-10-CM

## 2018-06-18 LAB — HBA1C MFR BLD: 7.1 %

## 2018-06-18 PROCEDURE — 83036 HEMOGLOBIN GLYCOSYLATED A1C: CPT | Performed by: FAMILY MEDICINE

## 2018-06-18 PROCEDURE — 1036F TOBACCO NON-USER: CPT | Performed by: FAMILY MEDICINE

## 2018-06-18 PROCEDURE — G8417 CALC BMI ABV UP PARAM F/U: HCPCS | Performed by: FAMILY MEDICINE

## 2018-06-18 PROCEDURE — 1123F ACP DISCUSS/DSCN MKR DOCD: CPT | Performed by: FAMILY MEDICINE

## 2018-06-18 PROCEDURE — 99213 OFFICE O/P EST LOW 20 MIN: CPT | Performed by: FAMILY MEDICINE

## 2018-06-18 PROCEDURE — 4040F PNEUMOC VAC/ADMIN/RCVD: CPT | Performed by: FAMILY MEDICINE

## 2018-06-18 PROCEDURE — G8598 ASA/ANTIPLAT THER USED: HCPCS | Performed by: FAMILY MEDICINE

## 2018-06-18 PROCEDURE — G8427 DOCREV CUR MEDS BY ELIG CLIN: HCPCS | Performed by: FAMILY MEDICINE

## 2018-06-18 RX ORDER — HYDROCHLOROTHIAZIDE 25 MG/1
25 TABLET ORAL DAILY
Qty: 90 TABLET | Refills: 3 | Status: SHIPPED | OUTPATIENT
Start: 2018-06-18 | End: 2018-07-09

## 2018-06-29 RX ORDER — AMLODIPINE BESYLATE 5 MG/1
TABLET ORAL
Qty: 90 TABLET | Refills: 1 | Status: SHIPPED | OUTPATIENT
Start: 2018-06-29 | End: 2018-07-30

## 2018-07-09 ENCOUNTER — OFFICE VISIT (OUTPATIENT)
Dept: FAMILY MEDICINE CLINIC | Age: 78
End: 2018-07-09

## 2018-07-09 VITALS
WEIGHT: 237 LBS | DIASTOLIC BLOOD PRESSURE: 50 MMHG | BODY MASS INDEX: 32.14 KG/M2 | HEART RATE: 72 BPM | OXYGEN SATURATION: 98 % | TEMPERATURE: 98 F | SYSTOLIC BLOOD PRESSURE: 90 MMHG

## 2018-07-09 DIAGNOSIS — E11.59 TYPE 2 DIABETES MELLITUS WITH VASCULAR DISEASE (HCC): Primary | ICD-10-CM

## 2018-07-09 DIAGNOSIS — I10 HYPERTENSION, ESSENTIAL: ICD-10-CM

## 2018-07-09 DIAGNOSIS — M79.641 HAND PAIN, RIGHT: ICD-10-CM

## 2018-07-09 PROCEDURE — 99214 OFFICE O/P EST MOD 30 MIN: CPT | Performed by: FAMILY MEDICINE

## 2018-07-09 PROCEDURE — G8598 ASA/ANTIPLAT THER USED: HCPCS | Performed by: FAMILY MEDICINE

## 2018-07-09 PROCEDURE — G8417 CALC BMI ABV UP PARAM F/U: HCPCS | Performed by: FAMILY MEDICINE

## 2018-07-09 PROCEDURE — 1036F TOBACCO NON-USER: CPT | Performed by: FAMILY MEDICINE

## 2018-07-09 PROCEDURE — G8427 DOCREV CUR MEDS BY ELIG CLIN: HCPCS | Performed by: FAMILY MEDICINE

## 2018-07-09 PROCEDURE — 4040F PNEUMOC VAC/ADMIN/RCVD: CPT | Performed by: FAMILY MEDICINE

## 2018-07-09 PROCEDURE — 1123F ACP DISCUSS/DSCN MKR DOCD: CPT | Performed by: FAMILY MEDICINE

## 2018-07-09 NOTE — PATIENT INSTRUCTIONS
ibuprofen. Some of these medicines can raise blood pressure. · Learn how to check your blood pressure at home. Lifestyle changes  · Stay at a healthy weight. This is especially important if you put on weight around the waist. Losing even 10 pounds can help you lower your blood pressure. · If your doctor recommends it, get more exercise. Walking is a good choice. Bit by bit, increase the amount you walk every day. Try for at least 30 minutes on most days of the week. You also may want to swim, bike, or do other activities. · Avoid or limit alcohol. Talk to your doctor about whether you can drink any alcohol. · Try to limit how much sodium you eat to less than 2,300 milligrams (mg) a day. Your doctor may ask you to try to eat less than 1,500 mg a day. · Eat plenty of fruits (such as bananas and oranges), vegetables, legumes, whole grains, and low-fat dairy products. · Lower the amount of saturated fat in your diet. Saturated fat is found in animal products such as milk, cheese, and meat. Limiting these foods may help you lose weight and also lower your risk for heart disease. · Do not smoke. Smoking increases your risk for heart attack and stroke. If you need help quitting, talk to your doctor about stop-smoking programs and medicines. These can increase your chances of quitting for good. When should you call for help? Call 911 anytime you think you may need emergency care. This may mean having symptoms that suggest that your blood pressure is causing a serious heart or blood vessel problem. Your blood pressure may be over 180/110.   For example, call 911 if:    · You have symptoms of a heart attack. These may include:  ¨ Chest pain or pressure, or a strange feeling in the chest.  ¨ Sweating. ¨ Shortness of breath. ¨ Nausea or vomiting. ¨ Pain, pressure, or a strange feeling in the back, neck, jaw, or upper belly or in one or both shoulders or arms. ¨ Lightheadedness or sudden weakness.   ¨ A fast or

## 2018-07-09 NOTE — PROGRESS NOTES
[address] Note    Date: 7/9/2018                                               Subjective/Objective:     Chief Complaint   Patient presents with    Hypertension     checks BP at home and going really low through out the day        HPI   Pt here for BP check/ DM2. Pt reports that his BP is going low during the day, has gone down to 66N systolic. Pt has gotten dizzy a few times, no LOC. Pt is not following diabetic diet, hasn't ever done this. Denies hypoglycemia. No polyuria/ polydipsia. Has lost about 5 lbs since 3 months ago. R hand pain starting 6 days ago. Located in center of palm. Started after he was doing some significant work with his hands, moving a refridgerator. No numbness. Hurts worse with movement. Is stable in severity.  No bruise         Patient Active Problem List    Diagnosis Date Noted    Essential hypertension, benign 07/12/2011     Priority: High    CAD (coronary artery disease) 07/12/2011     Priority: High    Status post AAA (abdominal aortic aneurysm) repair 11/10/2014     Priority: Medium    Carotid disease, bilateral ([address] 02/03/2014     Priority: Medium    Chronic systolic heart failure ([address]      Priority: Low    Sebaceous cyst     Vestibular dizziness involving left inner ear 08/10/2017    Hyperlipidemia 01/27/2017    Arthritis of right knee 09/15/2015    Obesity (BMI 30.0-34.9) 02/27/2015    Cholelithiasis 11/10/2014    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 ([address] 08/08/2014    Thoracic and lumbosacral neuritis 01/29/2014    Spinal stenosis of lumbar region 01/29/2014    Acquired spondylolisthesis 01/29/2014    Old MI (myocardial infarction) 07/12/2011       Past Medical History:   Diagnosis Date    Acquired spondylolisthesis 1/29/2014    Arthritis     diffuse    CAD (coronary artery disease)     S/P PCI and CABG    Carotid disease, bilateral ([address] 2/3/2014    Cholelithiasis 11/10/2014    Chronic systolic heart failure ([address] Frequency Provider Last Rate Last Dose    lidocaine PF 1 % injection 3 mL  3 mL Intradermal Once Piedad Wen MD           Allergies   Allergen Reactions    Latex Swelling and Rash     Blisters. Pt reacts to a variety of products, including balloons, rubber bands, gloves, foam pillows, adhesive tape, ACE bandages.  Iodine      Contrast,       Review of Systems   No CP, no SOB, no HA, no vomiting    Vitals:  BP (!) 90/50   Pulse 72   Temp 98 °F (36.7 °C)   Wt 237 lb (107.5 kg)   SpO2 98%   BMI 32.14 kg/m²     Physical Exam   General:  Well-appearing, NAD, alert, non-toxic  HEENT:  Normocephalic, atraumatic. Pupils equal and round. CHEST/LUNGS: CTAB, no crackles, no wheeze, no rhonchi. Symmetric rise  CARDIOVASCULAR: RRR,  no murmur, no rub  EXTREMETIES: Normal movement of all extremities  SKIN:  No rash, no cellulitis, no bruising, no petechiae/purpura/vesicles/pustules/abscess  PSYCH:  A+O x 3; normal affect  NEURO:  GCS 15, CN2-12 grossly intact, no focal motor/sensory deficits, no cerebellar deficits, normal gait, normal speech      Assessment/Plan     [de-identified]year-old male with type 2 diabetes. His A1c is slightly above goal, has unchanged last visit. This reason we will add Januvia low-dose once a day. His eye exams up-to-date. Follow-up in 3 months for diabetes exam.    Patient's blood pressure is low borderline. Given that he has gotten dizzy occasionally, we will stop the HCTZ. Is possible that he is losing fluid due to the hot weather. Follow-up in one month for blood pressure check. We may need to increase his amlodipine and his blood pressure rises above goal again. Patient has mild pain in his right hand following a day of extensive use. This is likely a muscle strain versus tendinopathy. Recommend rest, ice, ibuprofen 3 times a day. Follow-up if symptoms do not improve in 1-2 weeks.     Discussed with patient medication/s dosage, instructions, potential S/E, A/R and Drug

## 2018-07-30 ENCOUNTER — OFFICE VISIT (OUTPATIENT)
Dept: FAMILY MEDICINE CLINIC | Age: 78
End: 2018-07-30

## 2018-07-30 VITALS
HEART RATE: 71 BPM | TEMPERATURE: 97.5 F | BODY MASS INDEX: 32.55 KG/M2 | OXYGEN SATURATION: 97 % | DIASTOLIC BLOOD PRESSURE: 90 MMHG | SYSTOLIC BLOOD PRESSURE: 164 MMHG | WEIGHT: 240 LBS

## 2018-07-30 DIAGNOSIS — I10 HYPERTENSION, ESSENTIAL: ICD-10-CM

## 2018-07-30 DIAGNOSIS — R73.09 ELEVATED GLUCOSE: ICD-10-CM

## 2018-07-30 DIAGNOSIS — I10 HYPERTENSION, ESSENTIAL: Primary | ICD-10-CM

## 2018-07-30 LAB
GLUCOSE BLD-MCNC: 141 MG/DL
HBA1C MFR BLD: 5.2 %

## 2018-07-30 PROCEDURE — G8598 ASA/ANTIPLAT THER USED: HCPCS | Performed by: FAMILY MEDICINE

## 2018-07-30 PROCEDURE — 83036 HEMOGLOBIN GLYCOSYLATED A1C: CPT | Performed by: FAMILY MEDICINE

## 2018-07-30 PROCEDURE — G8427 DOCREV CUR MEDS BY ELIG CLIN: HCPCS | Performed by: FAMILY MEDICINE

## 2018-07-30 PROCEDURE — 4040F PNEUMOC VAC/ADMIN/RCVD: CPT | Performed by: FAMILY MEDICINE

## 2018-07-30 PROCEDURE — 1036F TOBACCO NON-USER: CPT | Performed by: FAMILY MEDICINE

## 2018-07-30 PROCEDURE — 99213 OFFICE O/P EST LOW 20 MIN: CPT | Performed by: FAMILY MEDICINE

## 2018-07-30 PROCEDURE — 82962 GLUCOSE BLOOD TEST: CPT | Performed by: FAMILY MEDICINE

## 2018-07-30 PROCEDURE — 1101F PT FALLS ASSESS-DOCD LE1/YR: CPT | Performed by: FAMILY MEDICINE

## 2018-07-30 PROCEDURE — 1123F ACP DISCUSS/DSCN MKR DOCD: CPT | Performed by: FAMILY MEDICINE

## 2018-07-30 PROCEDURE — G8417 CALC BMI ABV UP PARAM F/U: HCPCS | Performed by: FAMILY MEDICINE

## 2018-07-30 RX ORDER — AMLODIPINE BESYLATE 10 MG/1
10 TABLET ORAL DAILY
Qty: 90 TABLET | Refills: 5 | OUTPATIENT
Start: 2018-07-30

## 2018-07-30 RX ORDER — AMLODIPINE BESYLATE 10 MG/1
10 TABLET ORAL DAILY
Qty: 30 TABLET | Refills: 5 | Status: SHIPPED | OUTPATIENT
Start: 2018-07-30 | End: 2022-02-18

## 2018-07-30 NOTE — PROGRESS NOTES
[address] Note    Date: 7/30/2018                                               Subjective/Objective:     Chief Complaint   Patient presents with    Hypertension       HPI   Pt here for f/u on HTN. Stopped HCTZ and now has no further episodes of dizziness. Takes pills at night except for his 2nd metformin dose in the morning. No CP or SOB. No side effect from current meds. Pt is active doing projects around the house.          Patient Active Problem List    Diagnosis Date Noted    Essential hypertension, benign 07/12/2011     Priority: High    CAD (coronary artery disease) 07/12/2011     Priority: High    Status post AAA (abdominal aortic aneurysm) repair 11/10/2014     Priority: Medium    Carotid disease, bilateral ([address] 02/03/2014     Priority: Medium    Chronic systolic heart failure ([address]      Priority: Low    Sebaceous cyst     Vestibular dizziness involving left inner ear 08/10/2017    Hyperlipidemia 01/27/2017    Arthritis of right knee 09/15/2015    Obesity (BMI 30.0-34.9) 02/27/2015    Cholelithiasis 11/10/2014    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 ([address] 08/08/2014    Thoracic and lumbosacral neuritis 01/29/2014    Spinal stenosis of lumbar region 01/29/2014    Acquired spondylolisthesis 01/29/2014    Old MI (myocardial infarction) 07/12/2011       Past Medical History:   Diagnosis Date    Acquired spondylolisthesis 1/29/2014    Arthritis     diffuse    CAD (coronary artery disease)     S/P PCI and CABG    Carotid disease, bilateral ([address] 2/3/2014    Cholelithiasis 11/10/2014    Chronic systolic heart failure ([address]     Diabetes mellitus type II     Essential hypertension     Hyperlipidemia     Obesity (BMI 30.0-34.9) 2/27/2015    Prolonged emergence from general anesthesia     slow to wake up    Spinal stenosis of lumbar region 1/29/2014    Status post AAA (abdominal aortic aneurysm) repair 11/10/2014       Current Outpatient Prescriptions Medication Sig Dispense Refill    amLODIPine (NORVASC) 10 MG tablet Take 1 tablet by mouth daily 30 tablet 5    SITagliptin (JANUVIA) 25 MG tablet Take 1 tablet by mouth daily 30 tablet 2    metFORMIN (GLUCOPHAGE) 1000 MG tablet Take 1 tablet by mouth 2 times daily (with meals) 180 tablet 3    Blood Glucose Monitoring Suppl (TRUE METRIX AIR GLUCOSE METER) NATASHA Check blood sugar as needed 1 Device 0    glucose blood VI test strips (TRUETEST TEST) strip 1 each by In Vitro route daily As needed. 100 each 5    TRUEPLUS LANCETS 33G MISC Check blood sugar 4 times per day as needed 100 each 5    lisinopril (PRINIVIL;ZESTRIL) 20 MG tablet Take 1 tablet by mouth daily 90 tablet 1    metoprolol succinate (TOPROL XL) 25 MG extended release tablet Take 1 tablet by mouth nightly 90 tablet 1    atorvastatin (LIPITOR) 80 MG tablet Take 1 tablet by mouth daily 90 tablet 1    clopidogrel (PLAVIX) 75 MG tablet TAKE 1 TABLET BY MOUTH DAILY 90 tablet 1    nitroGLYCERIN (NITROSTAT) 0.4 MG SL tablet Place 1 tablet under the tongue every 5 minutes as needed for Chest pain (Patient taking differently: Place 0.4 mg under the tongue every 5 minutes as needed for Chest pain Never had to use it) 25 tablet 3    aspirin 81 MG tablet Take 81 mg by mouth daily      ibuprofen (ADVIL) 200 MG CAPS Take 2 capsules by mouth 3 times daily      Blood Glucose Monitoring Suppl (ACCU-CHEK THOMAS PLUS) W/DEVICE KIT       ASSURE COMFORT LANCETS 30G MISC        Current Facility-Administered Medications   Medication Dose Route Frequency Provider Last Rate Last Dose    lidocaine PF 1 % injection 3 mL  3 mL Intradermal Once Julio Garland MD           Allergies   Allergen Reactions    Latex Swelling and Rash     Blisters. Pt reacts to a variety of products, including balloons, rubber bands, gloves, foam pillows, adhesive tape, ACE bandages.     Iodine      Contrast,       Review of Systems   No vomiting, no rash, no bruise    Vitals:  BP (!)

## 2018-07-30 NOTE — PATIENT INSTRUCTIONS
Patient Education        High Blood Pressure: Care Instructions  Your Care Instructions    If your blood pressure is usually above 130/80, you have high blood pressure, or hypertension. That means the top number is 130 or higher or the bottom number is 80 or higher, or both. Despite what a lot of people think, high blood pressure usually doesn't cause headaches or make you feel dizzy or lightheaded. It usually has no symptoms. But it does increase your risk for heart attack, stroke, and kidney or eye damage. The higher your blood pressure, the more your risk increases. Your doctor will give you a goal for your blood pressure. Your goal will be based on your health and your age. Lifestyle changes, such as eating healthy and being active, are always important to help lower blood pressure. You might also take medicine to reach your blood pressure goal.  Follow-up care is a key part of your treatment and safety. Be sure to make and go to all appointments, and call your doctor if you are having problems. It's also a good idea to know your test results and keep a list of the medicines you take. How can you care for yourself at home? Medical treatment  · If you stop taking your medicine, your blood pressure will go back up. You may take one or more types of medicine to lower your blood pressure. Be safe with medicines. Take your medicine exactly as prescribed. Call your doctor if you think you are having a problem with your medicine. · Talk to your doctor before you start taking aspirin every day. Aspirin can help certain people lower their risk of a heart attack or stroke. But taking aspirin isn't right for everyone, because it can cause serious bleeding. · See your doctor regularly. You may need to see the doctor more often at first or until your blood pressure comes down.   · If you are taking blood pressure medicine, talk to your doctor before you take decongestants or anti-inflammatory medicine, such as ibuprofen. Some of these medicines can raise blood pressure. · Learn how to check your blood pressure at home. Lifestyle changes  · Stay at a healthy weight. This is especially important if you put on weight around the waist. Losing even 10 pounds can help you lower your blood pressure. · If your doctor recommends it, get more exercise. Walking is a good choice. Bit by bit, increase the amount you walk every day. Try for at least 30 minutes on most days of the week. You also may want to swim, bike, or do other activities. · Avoid or limit alcohol. Talk to your doctor about whether you can drink any alcohol. · Try to limit how much sodium you eat to less than 2,300 milligrams (mg) a day. Your doctor may ask you to try to eat less than 1,500 mg a day. · Eat plenty of fruits (such as bananas and oranges), vegetables, legumes, whole grains, and low-fat dairy products. · Lower the amount of saturated fat in your diet. Saturated fat is found in animal products such as milk, cheese, and meat. Limiting these foods may help you lose weight and also lower your risk for heart disease. · Do not smoke. Smoking increases your risk for heart attack and stroke. If you need help quitting, talk to your doctor about stop-smoking programs and medicines. These can increase your chances of quitting for good. When should you call for help? Call 911 anytime you think you may need emergency care. This may mean having symptoms that suggest that your blood pressure is causing a serious heart or blood vessel problem. Your blood pressure may be over 180/110.   For example, call 911 if:    · You have symptoms of a heart attack. These may include:  ¨ Chest pain or pressure, or a strange feeling in the chest.  ¨ Sweating. ¨ Shortness of breath. ¨ Nausea or vomiting. ¨ Pain, pressure, or a strange feeling in the back, neck, jaw, or upper belly or in one or both shoulders or arms. ¨ Lightheadedness or sudden weakness.   ¨ A fast or irregular heartbeat.     · You have symptoms of a stroke. These may include:  ¨ Sudden numbness, tingling, weakness, or loss of movement in your face, arm, or leg, especially on only one side of your body. ¨ Sudden vision changes. ¨ Sudden trouble speaking. ¨ Sudden confusion or trouble understanding simple statements. ¨ Sudden problems with walking or balance. ¨ A sudden, severe headache that is different from past headaches.     · You have severe back or belly pain.    Do not wait until your blood pressure comes down on its own. Get help right away.   Call your doctor now or seek immediate care if:    · Your blood pressure is much higher than normal (such as 180/110 or higher), but you don't have symptoms.     · You think high blood pressure is causing symptoms, such as:  ¨ Severe headache. ¨ Blurry vision.    Watch closely for changes in your health, and be sure to contact your doctor if:    · Your blood pressure measures 140/90 or higher at least 2 times. That means the top number is 140 or higher or the bottom number is 90 or higher, or both.     · You think you may be having side effects from your blood pressure medicine.     · Your blood pressure is usually normal, but it goes above normal at least 2 times. Where can you learn more? Go to https://MeetDoctor.[employer] org and sign in to your [employer] account. Enter Y985 in the [employer] box to learn more about \"High Blood Pressure: Care Instructions. \"     If you do not have an account, please click on the \"Sign Up Now\" link. Current as of: December 6, 2017  Content Version: 11.6  © 1891-5575 [employer]. Care instructions adapted under license by [employer] [facility] ([facility]). If you have questions about a medical condition or this instruction, always ask your healthcare professional. [place] [address] any warranty or liability for your use of this information.

## 2018-07-31 RX ORDER — GLUCOSAM/CHON-MSM1/C/MANG/BOSW 500-416.6
TABLET ORAL
Qty: 400 EACH | Refills: 5 | Status: SHIPPED | OUTPATIENT
Start: 2018-07-31 | End: 2021-03-22 | Stop reason: SDUPTHER

## 2018-08-13 ENCOUNTER — OFFICE VISIT (OUTPATIENT)
Dept: FAMILY MEDICINE CLINIC | Age: 78
End: 2018-08-13

## 2018-08-13 VITALS
DIASTOLIC BLOOD PRESSURE: 72 MMHG | OXYGEN SATURATION: 99 % | WEIGHT: 244.8 LBS | SYSTOLIC BLOOD PRESSURE: 138 MMHG | BODY MASS INDEX: 33.2 KG/M2 | HEART RATE: 60 BPM

## 2018-08-13 DIAGNOSIS — I10 HYPERTENSION, ESSENTIAL: Primary | ICD-10-CM

## 2018-08-13 PROCEDURE — 4040F PNEUMOC VAC/ADMIN/RCVD: CPT | Performed by: FAMILY MEDICINE

## 2018-08-13 PROCEDURE — G8598 ASA/ANTIPLAT THER USED: HCPCS | Performed by: FAMILY MEDICINE

## 2018-08-13 PROCEDURE — 1036F TOBACCO NON-USER: CPT | Performed by: FAMILY MEDICINE

## 2018-08-13 PROCEDURE — 1101F PT FALLS ASSESS-DOCD LE1/YR: CPT | Performed by: FAMILY MEDICINE

## 2018-08-13 PROCEDURE — G8417 CALC BMI ABV UP PARAM F/U: HCPCS | Performed by: FAMILY MEDICINE

## 2018-08-13 PROCEDURE — 1123F ACP DISCUSS/DSCN MKR DOCD: CPT | Performed by: FAMILY MEDICINE

## 2018-08-13 PROCEDURE — 99213 OFFICE O/P EST LOW 20 MIN: CPT | Performed by: FAMILY MEDICINE

## 2018-08-13 PROCEDURE — G8427 DOCREV CUR MEDS BY ELIG CLIN: HCPCS | Performed by: FAMILY MEDICINE

## 2018-08-13 ASSESSMENT — PATIENT HEALTH QUESTIONNAIRE - PHQ9
1. LITTLE INTEREST OR PLEASURE IN DOING THINGS: 0
SUM OF ALL RESPONSES TO PHQ9 QUESTIONS 1 & 2: 0
SUM OF ALL RESPONSES TO PHQ QUESTIONS 1-9: 0
2. FEELING DOWN, DEPRESSED OR HOPELESS: 0
SUM OF ALL RESPONSES TO PHQ QUESTIONS 1-9: 0

## 2018-08-13 NOTE — PROGRESS NOTES
[address] Note    Date: 8/13/2018                                               Subjective/Objective:     Chief Complaint   Patient presents with    Hypertension       HPI   Pt here for f/u on HTN. Pt taking amlodipine, Lisinopril, and metoprolol. Patient denies any side effects from the medicines. No dizziness or chest pain. Overall patient feels well.          Patient Active Problem List    Diagnosis Date Noted    Essential hypertension, benign 07/12/2011     Priority: High    CAD (coronary artery disease) 07/12/2011     Priority: High    Status post AAA (abdominal aortic aneurysm) repair 11/10/2014     Priority: Medium    Carot[address] disease, bilateral [address] 02/03/2014     Priority: Medium    Chronic systolic heart failure ([facility]      Priority: Low    Sebaceous cyst     Vestibular dizziness involving left inner ear 08/10/2017    Hyperlipidemia 01/27/2017    Arthritis of right knee 09/15/2015    Obesity (BMI 30.0-34.9) 02/27/2015    Cholelithiasis 11/10/2014    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 ([facility] 08/08/2014    Thoracic and lumbosacral neuritis 01/29/2014    Spinal stenosis of lumbar region 01/29/2014    Acquired spondylolisthesis 01/29/2014    Old MI (myocardial infarction) 07/12/2011       Past Medical History:   Diagnosis Date    Acquired spondylolisthesis 1/29/2014    Arthritis     diffuse    CAD (coronary artery disease)     S/P PCI and CABG    Carotid disease, bilateral ([address] 2/3/2014    Cholelithiasis 11/10/2014    Chronic systolic heart failure ([facility]     Diabetes mellitus type II     Essential hypertension     Hyperlipidemia     Obesity (BMI 30.0-34.9) 2/27/2015    Prolonged emergence from general anesthesia     slow to wake up    Spinal stenosis of lumbar region 1/29/2014    Status post AAA (abdominal aortic aneurysm) repair 11/10/2014       Current Outpatient Prescriptions   Medication Sig Dispense Refill    TRUEPLUS LANCETS 33G MISC kg/m²     Physical Exam   General:  Well-appearing, NAD, alert, non-toxic  HEENT:  Normocephalic, atraumatic. Pupils equal and round. CHEST/LUNGS: CTAB, no crackles, no wheeze, no rhonchi. Symmetric rise  CARDIOVASCULAR: RRR,  no murmur, no rub  EXTREMETIES: Normal movement of all extremities  SKIN:  No rash, no cellulitis, no bruising, no petechiae/purpura/vesicles/pustules/abscess  PSYCH:  A+O x 3; normal affect  NEURO:  GCS 15, CN2-12 grossly intact, no focal motor/sensory deficits, no cerebellar deficits, normal gait, normal speech      Assessment/Plan     78-year-old male with hypertension and diabetes mellitus. His blood pressure has improved on the current regimen, however he is somewhat above goal still. I suggested starting a new blood pressure medicine, the patient declined. He preferred to check it again in 2 months at his next diabetic visit. We may need to add a new agent at that time. I emphasized the importance of tight blood pressure control. Discussed with patient medication/s dosage, instructions, potential S/E, A/R and Drug Interaction  Educational material provided regarding patient's condition  Tylenol or Motrin as needed for pain or fever  Advise to return here if worse or go to nearest ER  Encourage fluids  Pt discharged in stable condition at 09:52      1. Hypertension, essential        No orders of the defined types were placed in this encounter. No Follow-up on file.     Marcell Cottrell MD    8/13/2018  9:51 AM

## 2018-09-18 ENCOUNTER — OFFICE VISIT (OUTPATIENT)
Dept: FAMILY MEDICINE CLINIC | Age: 78
End: 2018-09-18

## 2018-09-18 VITALS
OXYGEN SATURATION: 96 % | BODY MASS INDEX: 32.6 KG/M2 | DIASTOLIC BLOOD PRESSURE: 76 MMHG | WEIGHT: 240.4 LBS | SYSTOLIC BLOOD PRESSURE: 110 MMHG | HEART RATE: 61 BPM

## 2018-09-18 DIAGNOSIS — E11.59 TYPE 2 DIABETES MELLITUS WITH VASCULAR DISEASE (HCC): Primary | ICD-10-CM

## 2018-09-18 DIAGNOSIS — I10 HYPERTENSION, ESSENTIAL: ICD-10-CM

## 2018-09-18 PROCEDURE — G8417 CALC BMI ABV UP PARAM F/U: HCPCS | Performed by: FAMILY MEDICINE

## 2018-09-18 PROCEDURE — 1036F TOBACCO NON-USER: CPT | Performed by: FAMILY MEDICINE

## 2018-09-18 PROCEDURE — 4040F PNEUMOC VAC/ADMIN/RCVD: CPT | Performed by: FAMILY MEDICINE

## 2018-09-18 PROCEDURE — G8427 DOCREV CUR MEDS BY ELIG CLIN: HCPCS | Performed by: FAMILY MEDICINE

## 2018-09-18 PROCEDURE — 1101F PT FALLS ASSESS-DOCD LE1/YR: CPT | Performed by: FAMILY MEDICINE

## 2018-09-18 PROCEDURE — G8598 ASA/ANTIPLAT THER USED: HCPCS | Performed by: FAMILY MEDICINE

## 2018-09-18 PROCEDURE — 99213 OFFICE O/P EST LOW 20 MIN: CPT | Performed by: FAMILY MEDICINE

## 2018-09-18 PROCEDURE — 1123F ACP DISCUSS/DSCN MKR DOCD: CPT | Performed by: FAMILY MEDICINE

## 2018-09-18 ASSESSMENT — PATIENT HEALTH QUESTIONNAIRE - PHQ9
SUM OF ALL RESPONSES TO PHQ QUESTIONS 1-9: 0
SUM OF ALL RESPONSES TO PHQ QUESTIONS 1-9: 0
2. FEELING DOWN, DEPRESSED OR HOPELESS: 0
1. LITTLE INTEREST OR PLEASURE IN DOING THINGS: 0
SUM OF ALL RESPONSES TO PHQ9 QUESTIONS 1 & 2: 0

## 2018-09-18 NOTE — PROGRESS NOTES
[address] Note    Date: 9/18/2018                                               Subjective/Objective:   No chief complaint on file. HPI   Patient here for blood pressure check. He currently takes amlodipine, metoprolol, lisinopril. Denies any side effects of medications. Denies chest pain or shortness of breath.          Patient Active Problem List    Diagnosis Date Noted    Essential hypertension, benign 07/12/2011     Priority: High    CAD (coronary artery disease) 07/12/2011     Priority: High    Status post AAA (abdominal aortic aneurysm) repair 11/10/2014     Priority: Medium    Carotid disease, bilateral ([address] 02/03/2014     Priority: Medium    Chronic systolic heart failure ([facility]      Priority: Low    Sebaceous cyst     Vestibular dizziness involving left inner ear 08/10/2017    Hyperlipidemia 01/27/2017    Arthritis of right knee 09/15/2015    Obesity (BMI 30.0-34.9) 02/27/2015    Cholelithiasis 11/10/2014    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 ([facility] 08/08/2014    Thoracic and lumbosacral neuritis 01/29/2014    Spinal stenosis of lumbar region 01/29/2014    Acquired spondylolisthesis 01/29/2014    Old MI (myocardial infarction) 07/12/2011       Past Medical History:   Diagnosis Date    Acquired spondylolisthesis 1/29/2014    Arthritis     diffuse    CAD (coronary artery disease)     S/P PCI and CABG    Carotid disease, bilateral ([address] 2/3/2014    Cholelithiasis 11/10/2014    Chronic systolic heart failure ([facility]     Diabetes mellitus type II     Essential hypertension     Hyperlipidemia     Obesity (BMI 30.0-34.9) 2/27/2015    Prolonged emergence from general anesthesia     slow to wake up    Spinal stenosis of lumbar region 1/29/2014    Status post AAA (abdominal aortic aneurysm) repair 11/10/2014       Current Outpatient Prescriptions   Medication Sig Dispense Refill    TRUEPLUS LANCETS 33G MISC CHECK BLOOD SUGAR 4 TIMES PER DAY AS

## 2018-10-04 RX ORDER — METOPROLOL SUCCINATE 25 MG/1
TABLET, EXTENDED RELEASE ORAL
Qty: 90 TABLET | Refills: 3 | Status: SHIPPED | OUTPATIENT
Start: 2018-10-04 | End: 2018-11-26 | Stop reason: SDUPTHER

## 2018-10-08 ENCOUNTER — OFFICE VISIT (OUTPATIENT)
Dept: FAMILY MEDICINE CLINIC | Age: 78
End: 2018-10-08
Payer: MEDICARE

## 2018-10-08 VITALS
TEMPERATURE: 97.5 F | HEART RATE: 66 BPM | SYSTOLIC BLOOD PRESSURE: 125 MMHG | DIASTOLIC BLOOD PRESSURE: 70 MMHG | OXYGEN SATURATION: 95 % | WEIGHT: 243 LBS | BODY MASS INDEX: 32.96 KG/M2

## 2018-10-08 DIAGNOSIS — Z23 NEED FOR INFLUENZA VACCINATION: ICD-10-CM

## 2018-10-08 DIAGNOSIS — I10 HYPERTENSION, ESSENTIAL: Primary | ICD-10-CM

## 2018-10-08 PROCEDURE — 1036F TOBACCO NON-USER: CPT | Performed by: FAMILY MEDICINE

## 2018-10-08 PROCEDURE — 1123F ACP DISCUSS/DSCN MKR DOCD: CPT | Performed by: FAMILY MEDICINE

## 2018-10-08 PROCEDURE — 99213 OFFICE O/P EST LOW 20 MIN: CPT | Performed by: FAMILY MEDICINE

## 2018-10-08 PROCEDURE — G8598 ASA/ANTIPLAT THER USED: HCPCS | Performed by: FAMILY MEDICINE

## 2018-10-08 PROCEDURE — G8482 FLU IMMUNIZE ORDER/ADMIN: HCPCS | Performed by: FAMILY MEDICINE

## 2018-10-08 PROCEDURE — 4040F PNEUMOC VAC/ADMIN/RCVD: CPT | Performed by: FAMILY MEDICINE

## 2018-10-08 PROCEDURE — G0008 ADMIN INFLUENZA VIRUS VAC: HCPCS | Performed by: FAMILY MEDICINE

## 2018-10-08 PROCEDURE — 90662 IIV NO PRSV INCREASED AG IM: CPT | Performed by: FAMILY MEDICINE

## 2018-10-08 PROCEDURE — G8427 DOCREV CUR MEDS BY ELIG CLIN: HCPCS | Performed by: FAMILY MEDICINE

## 2018-10-08 PROCEDURE — G8417 CALC BMI ABV UP PARAM F/U: HCPCS | Performed by: FAMILY MEDICINE

## 2018-10-08 PROCEDURE — 1101F PT FALLS ASSESS-DOCD LE1/YR: CPT | Performed by: FAMILY MEDICINE

## 2018-10-08 NOTE — PROGRESS NOTES
XL) 25 MG extended release tablet TAKE 1 TABLET BY MOUTH ONE TIME A DAY  90 tablet 3    TRUEPLUS LANCETS 33G MISC CHECK BLOOD SUGAR 4 TIMES PER DAY AS NEEDED (Patient taking differently: CHECK BLOOD SUGAR 1 TIME PER DAY AS NEEDED) 400 each 5    amLODIPine (NORVASC) 10 MG tablet Take 1 tablet by mouth daily 30 tablet 5    SITagliptin (JANUVIA) 25 MG tablet Take 1 tablet by mouth daily 30 tablet 2    metFORMIN (GLUCOPHAGE) 1000 MG tablet Take 1 tablet by mouth 2 times daily (with meals) 180 tablet 3    Blood Glucose Monitoring Suppl (TRUE METRIX AIR GLUCOSE METER) NATASHA Check blood sugar as needed 1 Device 0    glucose blood VI test strips (TRUETEST TEST) strip 1 each by In Vitro route daily As needed. 100 each 5    lisinopril (PRINIVIL;ZESTRIL) 20 MG tablet Take 1 tablet by mouth daily 90 tablet 1    metoprolol succinate (TOPROL XL) 25 MG extended release tablet Take 1 tablet by mouth nightly 90 tablet 1    atorvastatin (LIPITOR) 80 MG tablet Take 1 tablet by mouth daily 90 tablet 1    clopidogrel (PLAVIX) 75 MG tablet TAKE 1 TABLET BY MOUTH DAILY 90 tablet 1    nitroGLYCERIN (NITROSTAT) 0.4 MG SL tablet Place 1 tablet under the tongue every 5 minutes as needed for Chest pain (Patient taking differently: Place 0.4 mg under the tongue every 5 minutes as needed for Chest pain Never had to use it) 25 tablet 3    aspirin 81 MG tablet Take 81 mg by mouth daily      ibuprofen (ADVIL) 200 MG CAPS Take 2 capsules by mouth 3 times daily       Current Facility-Administered Medications   Medication Dose Route Frequency Provider Last Rate Last Dose    lidocaine PF 1 % injection 3 mL  3 mL Intradermal Once Steffanie Arce MD           Allergies   Allergen Reactions    Latex Swelling and Rash     Blisters. Pt reacts to a variety of products, including balloons, rubber bands, gloves, foam pillows, adhesive tape, ACE bandages.     Iodine      Contrast,       Review of Systems   No headache, no vomiting    Vitals:  BP

## 2018-10-11 RX ORDER — CLOPIDOGREL BISULFATE 75 MG/1
TABLET ORAL
Qty: 90 TABLET | Refills: 0 | Status: SHIPPED | OUTPATIENT
Start: 2018-10-11 | End: 2018-12-17 | Stop reason: SDUPTHER

## 2018-11-27 RX ORDER — ATORVASTATIN CALCIUM 80 MG/1
TABLET, FILM COATED ORAL
Qty: 90 TABLET | Refills: 0 | Status: SHIPPED | OUTPATIENT
Start: 2018-11-27 | End: 2019-01-30 | Stop reason: SDUPTHER

## 2018-11-27 RX ORDER — METOPROLOL SUCCINATE 25 MG/1
TABLET, EXTENDED RELEASE ORAL
Qty: 90 TABLET | Refills: 0 | Status: SHIPPED | OUTPATIENT
Start: 2018-11-27 | End: 2019-01-30 | Stop reason: SDUPTHER

## 2018-11-27 RX ORDER — LISINOPRIL 20 MG/1
TABLET ORAL
Qty: 90 TABLET | Refills: 0 | Status: SHIPPED | OUTPATIENT
Start: 2018-11-27 | End: 2019-01-30 | Stop reason: SDUPTHER

## 2018-12-12 ENCOUNTER — TELEPHONE (OUTPATIENT)
Dept: FAMILY MEDICINE CLINIC | Age: 78
End: 2018-12-12

## 2018-12-18 RX ORDER — CLOPIDOGREL BISULFATE 75 MG/1
TABLET ORAL
Qty: 90 TABLET | Refills: 0 | Status: SHIPPED | OUTPATIENT
Start: 2018-12-18 | End: 2019-02-18 | Stop reason: SDUPTHER

## 2019-01-31 RX ORDER — LISINOPRIL 20 MG/1
TABLET ORAL
Qty: 90 TABLET | Refills: 0 | Status: SHIPPED | OUTPATIENT
Start: 2019-01-31 | End: 2019-04-03 | Stop reason: SDUPTHER

## 2019-01-31 RX ORDER — METOPROLOL SUCCINATE 25 MG/1
TABLET, EXTENDED RELEASE ORAL
Qty: 90 TABLET | Refills: 0 | Status: SHIPPED | OUTPATIENT
Start: 2019-01-31 | End: 2019-02-21 | Stop reason: SDUPTHER

## 2019-01-31 RX ORDER — ATORVASTATIN CALCIUM 80 MG/1
TABLET, FILM COATED ORAL
Qty: 90 TABLET | Refills: 0 | Status: SHIPPED | OUTPATIENT
Start: 2019-01-31 | End: 2019-04-03 | Stop reason: SDUPTHER

## 2019-02-19 RX ORDER — CLOPIDOGREL BISULFATE 75 MG/1
TABLET ORAL
Qty: 90 TABLET | Refills: 0 | Status: SHIPPED | OUTPATIENT
Start: 2019-02-19 | End: 2019-04-22 | Stop reason: SDUPTHER

## 2019-02-21 ENCOUNTER — OFFICE VISIT (OUTPATIENT)
Dept: CARDIOLOGY CLINIC | Age: 79
End: 2019-02-21
Payer: MEDICARE

## 2019-02-21 VITALS
HEIGHT: 72 IN | DIASTOLIC BLOOD PRESSURE: 82 MMHG | HEART RATE: 82 BPM | WEIGHT: 243 LBS | BODY MASS INDEX: 32.91 KG/M2 | SYSTOLIC BLOOD PRESSURE: 146 MMHG

## 2019-02-21 DIAGNOSIS — E78.2 MIXED HYPERLIPIDEMIA: Primary | ICD-10-CM

## 2019-02-21 DIAGNOSIS — I25.110 CORONARY ARTERY DISEASE INVOLVING NATIVE CORONARY ARTERY OF NATIVE HEART WITH UNSTABLE ANGINA PECTORIS (HCC): ICD-10-CM

## 2019-02-21 DIAGNOSIS — I10 ESSENTIAL HYPERTENSION, BENIGN: ICD-10-CM

## 2019-02-21 PROCEDURE — 99214 OFFICE O/P EST MOD 30 MIN: CPT | Performed by: NURSE PRACTITIONER

## 2019-02-21 PROCEDURE — G8598 ASA/ANTIPLAT THER USED: HCPCS | Performed by: NURSE PRACTITIONER

## 2019-02-21 PROCEDURE — 1123F ACP DISCUSS/DSCN MKR DOCD: CPT | Performed by: NURSE PRACTITIONER

## 2019-02-21 PROCEDURE — 4040F PNEUMOC VAC/ADMIN/RCVD: CPT | Performed by: NURSE PRACTITIONER

## 2019-02-21 PROCEDURE — 1036F TOBACCO NON-USER: CPT | Performed by: NURSE PRACTITIONER

## 2019-02-21 PROCEDURE — G8427 DOCREV CUR MEDS BY ELIG CLIN: HCPCS | Performed by: NURSE PRACTITIONER

## 2019-02-21 PROCEDURE — 1101F PT FALLS ASSESS-DOCD LE1/YR: CPT | Performed by: NURSE PRACTITIONER

## 2019-02-21 PROCEDURE — G8417 CALC BMI ABV UP PARAM F/U: HCPCS | Performed by: NURSE PRACTITIONER

## 2019-02-21 PROCEDURE — G8482 FLU IMMUNIZE ORDER/ADMIN: HCPCS | Performed by: NURSE PRACTITIONER

## 2019-03-22 ENCOUNTER — HOSPITAL ENCOUNTER (OUTPATIENT)
Dept: CT IMAGING | Age: 79
Discharge: HOME | End: 2019-03-22
Payer: MEDICARE

## 2019-03-22 DIAGNOSIS — M70.62 TROCHANTERIC BURSITIS OF LEFT HIP: ICD-10-CM

## 2019-03-22 PROCEDURE — 73700 CT LOWER EXTREMITY W/O DYE: CPT

## 2019-04-03 RX ORDER — METOPROLOL SUCCINATE 25 MG/1
TABLET, EXTENDED RELEASE ORAL
Qty: 90 TABLET | Refills: 3 | Status: SHIPPED | OUTPATIENT
Start: 2019-04-03 | End: 2020-01-20

## 2019-04-03 RX ORDER — ATORVASTATIN CALCIUM 80 MG/1
TABLET, FILM COATED ORAL
Qty: 90 TABLET | Refills: 3 | Status: SHIPPED | OUTPATIENT
Start: 2019-04-03 | End: 2020-01-20

## 2019-04-03 RX ORDER — LISINOPRIL 20 MG/1
TABLET ORAL
Qty: 90 TABLET | Refills: 3 | Status: SHIPPED | OUTPATIENT
Start: 2019-04-03 | End: 2020-11-11

## 2019-04-03 NOTE — TELEPHONE ENCOUNTER
Last ov 2/21/19  Pending appt 8/22/19  Last refill metoprolol 5/24/19 #90x1, other meds 1/31/19 #90 NR

## 2019-04-08 ENCOUNTER — OFFICE VISIT (OUTPATIENT)
Dept: FAMILY MEDICINE CLINIC | Age: 79
End: 2019-04-08
Payer: MEDICARE

## 2019-04-08 VITALS
OXYGEN SATURATION: 98 % | BODY MASS INDEX: 31.74 KG/M2 | DIASTOLIC BLOOD PRESSURE: 98 MMHG | WEIGHT: 234 LBS | HEART RATE: 83 BPM | SYSTOLIC BLOOD PRESSURE: 130 MMHG

## 2019-04-08 DIAGNOSIS — I10 HYPERTENSION, ESSENTIAL: ICD-10-CM

## 2019-04-08 DIAGNOSIS — R35.1 NOCTURIA: ICD-10-CM

## 2019-04-08 DIAGNOSIS — Z00.00 ANNUAL PHYSICAL EXAM: ICD-10-CM

## 2019-04-08 DIAGNOSIS — E11.59 TYPE 2 DIABETES MELLITUS WITH VASCULAR DISEASE (HCC): Primary | ICD-10-CM

## 2019-04-08 DIAGNOSIS — K62.5 RECTAL BLEEDING: ICD-10-CM

## 2019-04-08 LAB
BASOPHILS ABSOLUTE: 0.1 K/UL (ref 0–0.2)
BASOPHILS RELATIVE PERCENT: 1.2 %
EOSINOPHILS ABSOLUTE: 0.2 K/UL (ref 0–0.6)
EOSINOPHILS RELATIVE PERCENT: 4.9 %
HBA1C MFR BLD: 6.5 %
HCT VFR BLD CALC: 43.5 % (ref 40.5–52.5)
HEMOGLOBIN: 14.7 G/DL (ref 13.5–17.5)
LYMPHOCYTES ABSOLUTE: 1 K/UL (ref 1–5.1)
LYMPHOCYTES RELATIVE PERCENT: 22.1 %
MCH RBC QN AUTO: 32.4 PG (ref 26–34)
MCHC RBC AUTO-ENTMCNC: 33.8 G/DL (ref 31–36)
MCV RBC AUTO: 95.8 FL (ref 80–100)
MONOCYTES ABSOLUTE: 0.3 K/UL (ref 0–1.3)
MONOCYTES RELATIVE PERCENT: 6.4 %
NEUTROPHILS ABSOLUTE: 3 K/UL (ref 1.7–7.7)
NEUTROPHILS RELATIVE PERCENT: 65.4 %
PDW BLD-RTO: 14.3 % (ref 12.4–15.4)
PLATELET # BLD: 156 K/UL (ref 135–450)
PMV BLD AUTO: 10.7 FL (ref 5–10.5)
RBC # BLD: 4.55 M/UL (ref 4.2–5.9)
WBC # BLD: 4.7 K/UL (ref 4–11)

## 2019-04-08 PROCEDURE — 1036F TOBACCO NON-USER: CPT | Performed by: FAMILY MEDICINE

## 2019-04-08 PROCEDURE — 4040F PNEUMOC VAC/ADMIN/RCVD: CPT | Performed by: FAMILY MEDICINE

## 2019-04-08 PROCEDURE — G8427 DOCREV CUR MEDS BY ELIG CLIN: HCPCS | Performed by: FAMILY MEDICINE

## 2019-04-08 PROCEDURE — 99214 OFFICE O/P EST MOD 30 MIN: CPT | Performed by: FAMILY MEDICINE

## 2019-04-08 PROCEDURE — 1123F ACP DISCUSS/DSCN MKR DOCD: CPT | Performed by: FAMILY MEDICINE

## 2019-04-08 PROCEDURE — G8598 ASA/ANTIPLAT THER USED: HCPCS | Performed by: FAMILY MEDICINE

## 2019-04-08 PROCEDURE — 83036 HEMOGLOBIN GLYCOSYLATED A1C: CPT | Performed by: FAMILY MEDICINE

## 2019-04-08 PROCEDURE — G8417 CALC BMI ABV UP PARAM F/U: HCPCS | Performed by: FAMILY MEDICINE

## 2019-04-08 RX ORDER — TAMSULOSIN HYDROCHLORIDE 0.4 MG/1
0.4 CAPSULE ORAL DAILY
Qty: 90 CAPSULE | Refills: 1 | Status: SHIPPED | OUTPATIENT
Start: 2019-04-08 | End: 2019-08-21 | Stop reason: SDUPTHER

## 2019-04-08 ASSESSMENT — PATIENT HEALTH QUESTIONNAIRE - PHQ9
SUM OF ALL RESPONSES TO PHQ QUESTIONS 1-9: 0
1. LITTLE INTEREST OR PLEASURE IN DOING THINGS: 0
SUM OF ALL RESPONSES TO PHQ9 QUESTIONS 1 & 2: 0
SUM OF ALL RESPONSES TO PHQ QUESTIONS 1-9: 0
2. FEELING DOWN, DEPRESSED OR HOPELESS: 0

## 2019-04-08 NOTE — PATIENT INSTRUCTIONS
irregular heartbeat.     · You have symptoms of a stroke. These may include:  ? Sudden numbness, tingling, weakness, or loss of movement in your face, arm, or leg, especially on only one side of your body. ? Sudden vision changes. ? Sudden trouble speaking. ? Sudden confusion or trouble understanding simple statements. ? Sudden problems with walking or balance. ? A sudden, severe headache that is different from past headaches.     · You have severe back or belly pain.    Do not wait until your blood pressure comes down on its own. Get help right away.   Call your doctor now or seek immediate care if:    · Your blood pressure is much higher than normal (such as 180/120 or higher), but you don't have symptoms.     · You think high blood pressure is causing symptoms, such as:  ? Severe headache.  ? Blurry vision.    Watch closely for changes in your health, and be sure to contact your doctor if:    · Your blood pressure measures higher than your doctor recommends at least 2 times. That means the top number is higher or the bottom number is higher, or both.     · You think you may be having side effects from your blood pressure medicine. Where can you learn more? Go to https://Appwapppepiceweb.[employer]. org and sign in to your [employer] account. Enter C116 in the [employer] box to learn more about \"High Blood Pressure: Care Instructions. \"     If you do not have an account, please click on the \"Sign Up Now\" link. Current as of: July 22, 2018  Content Version: 11.9  © 9836-8658 "[employer] Incorporated. Care instructions adapted under license by [facility] [employer] [facility] ([facility]). If you have questions about a medical condition or this instruction, always ask your healthcare professional. [place] [address] any warranty or liability for your use of this information.

## 2019-04-08 NOTE — PROGRESS NOTES
[address] Note    Date: 4/8/2019                                               Subjective/Objective:     Chief Complaint   Patient presents with    Check-Up     BP. DM       HPI   Pt here for DM checkup. Takes Januvia and Metformin. Denies polyuria/ polydipsia. Does report urinary frequency and small voids for some time now. No vision change. Last eye exam within the last year. Pt has HTN, takes metoprolol and lisinopril and amlodipine. Pt has concerns for BRB per rectum x1 a few days ago. No SEs from meds. No CP or SOB. Noticed blood on the bed when he was sitting there. Wiped in the bathroom and there was more BRB. No pain. Has since resolved. NO weakness or dizziness.          Patient Active Problem List    Diagnosis Date Noted    Essential hypertension, benign 07/12/2011     Priority: High    CAD (coronary artery disease) 07/12/2011     Priority: High    Status post AAA (abdominal aortic aneurysm) repair 11/10/2014     Priority: Medium    Carotid disease, bilateral ([address] 02/03/2014     Priority: Medium    Chronic systolic heart failure ([address]      Priority: Low    Sebaceous cyst     Vestibular dizziness involving left inner ear 08/10/2017    Hyperlipidemia 01/27/2017    Arthritis of right knee 09/15/2015    Obesity (BMI 30.0-34.9) 02/27/2015    Cholelithiasis 11/10/2014    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 ([address] 08/08/2014    Thoracic and lumbosacral neuritis 01/29/2014    Spinal stenosis of lumbar region 01/29/2014    Acquired spondylolisthesis 01/29/2014    Old MI (myocardial infarction) 07/12/2011       Past Medical History:   Diagnosis Date    Acquired spondylolisthesis 1/29/2014    Arthritis     diffuse    CAD (coronary artery disease)     S/P PCI and CABG    Carotid disease, bilateral ([address] 2/3/2014    Cholelithiasis 11/10/2014    Chronic systolic heart failure ([address]     Diabetes mellitus type II     Essential hypertension     Hyperlipidemia  Obesity (BMI 30.0-34.9) 2/27/2015    Prolonged emergence from general anesthesia     slow to wake up    Spinal stenosis of lumbar region 1/29/2014    Status post AAA (abdominal aortic aneurysm) repair 11/10/2014       Current Outpatient Medications   Medication Sig Dispense Refill    tamsulosin (FLOMAX) 0.4 MG capsule Take 1 capsule by mouth daily 90 capsule 1    atorvastatin (LIPITOR) 80 MG tablet TAKE 1 TABLET EVERY DAY 90 tablet 3    metoprolol succinate (TOPROL XL) 25 MG extended release tablet TAKE 1 TABLET EVERY NIGHT 90 tablet 3    lisinopril (PRINIVIL;ZESTRIL) 20 MG tablet TAKE 1 TABLET EVERY DAY 90 tablet 3    clopidogrel (PLAVIX) 75 MG tablet TAKE 1 TABLET EVERY DAY (NEED MD APPOINTMENT FOR MORE REFILLS) 90 tablet 0    TRUEPLUS LANCETS 33G MISC CHECK BLOOD SUGAR 4 TIMES PER DAY AS NEEDED (Patient taking differently: CHECK BLOOD SUGAR 1 TIME PER DAY AS NEEDED) 400 each 5    amLODIPine (NORVASC) 10 MG tablet Take 1 tablet by mouth daily 30 tablet 5    SITagliptin (JANUVIA) 25 MG tablet Take 1 tablet by mouth daily 30 tablet 2    metFORMIN (GLUCOPHAGE) 1000 MG tablet Take 1 tablet by mouth 2 times daily (with meals) 180 tablet 3    Blood Glucose Monitoring Suppl (TRUE METRIX AIR GLUCOSE METER) NATASHA Check blood sugar as needed 1 Device 0    glucose blood VI test strips (TRUETEST TEST) strip 1 each by In Vitro route daily As needed.  100 each 5    nitroGLYCERIN (NITROSTAT) 0.4 MG SL tablet Place 1 tablet under the tongue every 5 minutes as needed for Chest pain (Patient taking differently: Place 0.4 mg under the tongue every 5 minutes as needed for Chest pain Never had to use it) 25 tablet 3    aspirin 81 MG tablet Take 81 mg by mouth daily      ibuprofen (ADVIL) 200 MG CAPS Take 2 capsules by mouth 3 times daily       Current Facility-Administered Medications   Medication Dose Route Frequency Provider Last Rate Last Dose    lidocaine PF 1 % injection 3 mL  3 mL Intradermal Once Kate Cadet Abdullahi Jeffery MD           Allergies   Allergen Reactions    Latex Swelling and Rash     Blisters. Pt reacts to a variety of products, including balloons, rubber bands, gloves, foam pillows, adhesive tape, ACE bandages.  Iodine      Contrast,       Review of Systems   No vomiting, no HA, no ankle swelling    Vitals:  BP (!) 130/98   Pulse 83   Wt 234 lb (106.1 kg)   SpO2 98%   BMI 31.74 kg/m²     Physical Exam   General:  Well-appearing, NAD, alert, non-toxic  HEENT:  Normocephalic, atraumatic. Pupils equal and round. CHEST/LUNGS: CTAB, no crackles, no wheeze, no rhonchi. Symmetric rise  CARDIOVASCULAR: RRR,  no murmur, no rub  EXTREMETIES: Normal movement of all extremities  SKIN:  No rash, no cellulitis, no bruising, no petechiae/purpura/vesicles/pustules/abscess  RECTAL:  +multiple small hemorrhoids, less than 1cm each  NEURO:  GCS 15, CN2-12 grossly intact, no focal motor/sensory deficits, no cerebellar deficits, normal gait, normal speech      Assessment/Plan     [de-identified] male with DM 2. A1c is at goal. CPM. Continue diabetic diet. F/u in 6 months for DM checkup. Pt has HTN. BP is borderline. CPM. F/u in 2 weeks for BP check. Pt has urinary frequency/ nocturia. Likely BPH. We'll check PSA. Start on Flomax. Assess progress in 2 weeks. Pt had one episode of rectal bleeding. Likely due to internal hemorrhoid. Resolved. Continue to monitor for further bleeding. Discussed with patient medication/s dosage, instructions, potential S/E, A/R and Drug Interaction  Educational material provided regarding patient's condition  Tylenol or Motrin as needed for pain or fever  Advise to return here if worse or go to nearest ER  Encourage fluids  Pt discharged in stable condition at 09:34      1. 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 Hypertension, essential      3. Rectal bleeding      4.  Nocturia    - PSA, Total and Free; Future  - tamsulosin (FLOMAX) 0.4 MG capsule; Take 1 capsule by mouth daily  Dispense: 90 capsule; Refill: 1    5. Annual physical exam    - CBC Auto Differential; Future  - Comprehensive Metabolic Panel; Future  - Lipid, Fasting; Future  - TSH with Reflex; Future         Orders Placed This Encounter   Procedures    CBC Auto Differential     Standing Status:   Future     Standing Expiration Date:   4/8/2020    Comprehensive Metabolic Panel     Standing Status:   Future     Standing Expiration Date:   4/8/2020    Lipid, Fasting     Standing Status:   Future     Standing Expiration Date:   4/8/2020    PSA, Total and Free     Standing Status:   Future     Standing Expiration Date:   4/8/2020    TSH with Reflex     Standing Status:   Future     Standing Expiration Date:   4/8/2020    POCT glycosylated hemoglobin (Hb A1C)       Return in about 2 weeks (around 4/22/2019) for blood pressure check.     Archie Zelaya MD    4/8/2019  9:23 AM

## 2019-04-09 LAB
A/G RATIO: 1.4 (ref 1.1–2.2)
ALBUMIN SERPL-MCNC: 4 G/DL (ref 3.4–5)
ALP BLD-CCNC: 113 U/L (ref 40–129)
ALT SERPL-CCNC: 15 U/L (ref 10–40)
ANION GAP SERPL CALCULATED.3IONS-SCNC: 12 MMOL/L (ref 3–16)
AST SERPL-CCNC: 17 U/L (ref 15–37)
BILIRUB SERPL-MCNC: 0.4 MG/DL (ref 0–1)
BUN BLDV-MCNC: 10 MG/DL (ref 7–20)
CALCIUM SERPL-MCNC: 9.4 MG/DL (ref 8.3–10.6)
CHLORIDE BLD-SCNC: 106 MMOL/L (ref 99–110)
CHOLESTEROL, FASTING: 125 MG/DL (ref 0–199)
CO2: 27 MMOL/L (ref 21–32)
CREAT SERPL-MCNC: 1 MG/DL (ref 0.8–1.3)
GFR AFRICAN AMERICAN: >60
GFR NON-AFRICAN AMERICAN: >60
GLOBULIN: 2.9 G/DL
GLUCOSE BLD-MCNC: 145 MG/DL (ref 70–99)
HDLC SERPL-MCNC: 39 MG/DL (ref 40–60)
LDL CHOLESTEROL CALCULATED: 66 MG/DL
POTASSIUM SERPL-SCNC: 4.4 MMOL/L (ref 3.5–5.1)
SODIUM BLD-SCNC: 145 MMOL/L (ref 136–145)
TOTAL PROTEIN: 6.9 G/DL (ref 6.4–8.2)
TRIGLYCERIDE, FASTING: 101 MG/DL (ref 0–150)
TSH REFLEX: 1.07 UIU/ML (ref 0.27–4.2)
VLDLC SERPL CALC-MCNC: 20 MG/DL

## 2019-04-11 LAB
PROSTATE SPECIFIC ANTIGEN FREE: 0.3 UG/L
PROSTATE SPECIFIC ANTIGEN PERCENT FREE: 27.3 %
PROSTATE SPECIFIC ANTIGEN: 1.1 UG/L (ref 0–4)

## 2019-04-11 NOTE — TELEPHONE ENCOUNTER
Medication and Quantity requested:  [employer] True Metrix Test Strip (50) - qty 100    Last Visit  04/08/19 - Dr Fiona Pham and phone number updated in EPIC:  yes

## 2019-04-22 RX ORDER — CLOPIDOGREL BISULFATE 75 MG/1
TABLET ORAL
Qty: 90 TABLET | Refills: 2 | Status: SHIPPED | OUTPATIENT
Start: 2019-04-22 | End: 2019-11-20 | Stop reason: SDUPTHER

## 2019-04-29 ENCOUNTER — OFFICE VISIT (OUTPATIENT)
Dept: FAMILY MEDICINE CLINIC | Age: 79
End: 2019-04-29
Payer: MEDICARE

## 2019-04-29 VITALS
SYSTOLIC BLOOD PRESSURE: 120 MMHG | DIASTOLIC BLOOD PRESSURE: 80 MMHG | BODY MASS INDEX: 32.28 KG/M2 | WEIGHT: 238 LBS | HEART RATE: 67 BPM | OXYGEN SATURATION: 97 %

## 2019-04-29 DIAGNOSIS — I10 HYPERTENSION, ESSENTIAL: Primary | ICD-10-CM

## 2019-04-29 DIAGNOSIS — K62.5 RECTAL BLEEDING: ICD-10-CM

## 2019-04-29 PROCEDURE — G8598 ASA/ANTIPLAT THER USED: HCPCS | Performed by: FAMILY MEDICINE

## 2019-04-29 PROCEDURE — 99213 OFFICE O/P EST LOW 20 MIN: CPT | Performed by: FAMILY MEDICINE

## 2019-04-29 PROCEDURE — 1123F ACP DISCUSS/DSCN MKR DOCD: CPT | Performed by: FAMILY MEDICINE

## 2019-04-29 PROCEDURE — 1036F TOBACCO NON-USER: CPT | Performed by: FAMILY MEDICINE

## 2019-04-29 PROCEDURE — G8427 DOCREV CUR MEDS BY ELIG CLIN: HCPCS | Performed by: FAMILY MEDICINE

## 2019-04-29 PROCEDURE — 4040F PNEUMOC VAC/ADMIN/RCVD: CPT | Performed by: FAMILY MEDICINE

## 2019-04-29 PROCEDURE — G8417 CALC BMI ABV UP PARAM F/U: HCPCS | Performed by: FAMILY MEDICINE

## 2019-04-29 NOTE — PROGRESS NOTES
Medications   Medication Sig Dispense Refill    clopidogrel (PLAVIX) 75 MG tablet TAKE 1 TABLET EVERY DAY (NEED MD APPOINTMENT FOR MORE REFILLS) 90 tablet 2    blood glucose test strips (TRUETEST TEST) strip 1 each by In Vitro route daily As needed. 100 each 5    tamsulosin (FLOMAX) 0.4 MG capsule Take 1 capsule by mouth daily 90 capsule 1    atorvastatin (LIPITOR) 80 MG tablet TAKE 1 TABLET EVERY DAY 90 tablet 3    metoprolol succinate (TOPROL XL) 25 MG extended release tablet TAKE 1 TABLET EVERY NIGHT 90 tablet 3    lisinopril (PRINIVIL;ZESTRIL) 20 MG tablet TAKE 1 TABLET EVERY DAY 90 tablet 3    TRUEPLUS LANCETS 33G MISC CHECK BLOOD SUGAR 4 TIMES PER DAY AS NEEDED (Patient taking differently: CHECK BLOOD SUGAR 1 TIME PER DAY AS NEEDED) 400 each 5    amLODIPine (NORVASC) 10 MG tablet Take 1 tablet by mouth daily 30 tablet 5    SITagliptin (JANUVIA) 25 MG tablet Take 1 tablet by mouth daily 30 tablet 2    metFORMIN (GLUCOPHAGE) 1000 MG tablet Take 1 tablet by mouth 2 times daily (with meals) 180 tablet 3    Blood Glucose Monitoring Suppl (TRUE METRIX AIR GLUCOSE METER) NATASHA Check blood sugar as needed 1 Device 0    nitroGLYCERIN (NITROSTAT) 0.4 MG SL tablet Place 1 tablet under the tongue every 5 minutes as needed for Chest pain (Patient taking differently: Place 0.4 mg under the tongue every 5 minutes as needed for Chest pain Never had to use it) 25 tablet 3    aspirin 81 MG tablet Take 81 mg by mouth daily      ibuprofen (ADVIL) 200 MG CAPS Take 2 capsules by mouth 3 times daily       Current Facility-Administered Medications   Medication Dose Route Frequency Provider Last Rate Last Dose    lidocaine PF 1 % injection 3 mL  3 mL Intradermal Once Stefania Rowe MD           Allergies   Allergen Reactions    Latex Swelling and Rash     Blisters. Pt reacts to a variety of products, including balloons, rubber bands, gloves, foam pillows, adhesive tape, ACE bandages.     Iodine      Contrast, Review of Systems    no headache, no dizziness, no vomiting    Vitals:  /80 (Site: Right Upper Arm, Position: Sitting)   Pulse 67   Wt 238 lb (108 kg)   SpO2 97%   BMI 32.28 kg/m²     Physical Exam   General:  Well-appearing, NAD, alert, non-toxic  HEENT:  Normocephalic, atraumatic. Pupils equal and round. CHEST/LUNGS: CTAB, no crackles, no wheeze, no rhonchi. Symmetric rise  CARDIOVASCULAR: RRR,  no murmur, no rub  EXTREMETIES: Normal movement of all extremities  SKIN:  No rash, no cellulitis, no bruising, no petechiae/purpura/vesicles/pustules/abscess  PSYCH:  A+O x 3; normal affect  NEURO:  GCS 15, CN2-12 grossly intact, no focal motor/sensory deficits, no cerebellar deficits,+ambulating with cane, normal speech      Assessment/Plan     [de-identified] male with HTN. BP is at goal. Continue current medicines. F/u in 6 months for HTN/ DM2 checkup. Discharged in stable condition at 10:58    1. Hypertension, essential      2. Rectal bleeding         No orders of the defined types were placed in this encounter. No follow-ups on file.     Jorge Luis Hernandez MD    4/29/2019  10:56 AM

## 2019-06-17 ENCOUNTER — TELEPHONE (OUTPATIENT)
Dept: CARDIOLOGY CLINIC | Age: 79
End: 2019-06-17

## 2019-06-17 DIAGNOSIS — Z01.810 ENCOUNTER FOR PRE-OPERATIVE CARDIOVASCULAR CLEARANCE: Primary | ICD-10-CM

## 2019-06-17 NOTE — TELEPHONE ENCOUNTER
CARDIAC CLEARANCE     What type of procedure are you having? Lt Hip Repair    Which physician is performing your procedure? Dr Ortez Heads    When is your procedure scheduled for? Not yet Ric pending clx    Where are you having this procedure? [facility]    Are you taking Blood Thinners? Yes   If so what? Plavix (Name/dose/frequesncy)     Does the surgeon want you to stop your blood thinner? If so for how long?     Phone Number and Contact Name for Physicians office: [phone]    Fax number to send information:

## 2019-06-20 ENCOUNTER — HOSPITAL ENCOUNTER (OUTPATIENT)
Dept: NON INVASIVE DIAGNOSTICS | Age: 79
Discharge: HOME | End: 2019-06-20
Payer: MEDICARE

## 2019-06-20 DIAGNOSIS — Z01.810 ENCOUNTER FOR PRE-OPERATIVE CARDIOVASCULAR CLEARANCE: ICD-10-CM

## 2019-06-20 LAB
LV EF: 66 %
LVEF MODALITY: NORMAL

## 2019-06-20 PROCEDURE — 3430000000 HC RX DIAGNOSTIC RADIOPHARMACEUTICAL: Performed by: INTERNAL MEDICINE

## 2019-06-20 PROCEDURE — 6360000002 HC RX W HCPCS: Performed by: INTERNAL MEDICINE

## 2019-06-20 PROCEDURE — 78452 HT MUSCLE IMAGE SPECT MULT: CPT

## 2019-06-20 PROCEDURE — A9502 TC99M TETROFOSMIN: HCPCS | Performed by: INTERNAL MEDICINE

## 2019-06-20 PROCEDURE — 93017 CV STRESS TEST TRACING ONLY: CPT | Performed by: INTERNAL MEDICINE

## 2019-06-20 RX ADMIN — REGADENOSON 0.4 MG: 0.08 INJECTION, SOLUTION INTRAVENOUS at 09:59

## 2019-06-20 RX ADMIN — TETROFOSMIN 30 MILLICURIE: 1.38 INJECTION, POWDER, LYOPHILIZED, FOR SOLUTION INTRAVENOUS at 09:59

## 2019-06-20 RX ADMIN — TETROFOSMIN 10 MILLICURIE: 1.38 INJECTION, POWDER, LYOPHILIZED, FOR SOLUTION INTRAVENOUS at 08:09

## 2019-06-20 NOTE — PROGRESS NOTES
Instructed on Lexiscan Stress Test Procedure including possible side effects/ adverse reactions. Patient verbalizes  understanding and denies having any questions . See [address] [place] [address] Cardiology

## 2019-08-21 DIAGNOSIS — R35.1 NOCTURIA: ICD-10-CM

## 2019-08-22 RX ORDER — TAMSULOSIN HYDROCHLORIDE 0.4 MG/1
CAPSULE ORAL
Qty: 90 CAPSULE | Refills: 3 | Status: ON HOLD | OUTPATIENT
Start: 2019-08-22 | End: 2019-09-23

## 2019-08-27 NOTE — PROGRESS NOTES
jillian's documentation has been prepared under my direction and personally reviewed by me in its entirety. I confirm that the note above accurately reflects all work, treatment, procedures, and medical decision making performed by me.

## 2019-09-03 ENCOUNTER — OFFICE VISIT (OUTPATIENT)
Dept: CARDIOLOGY CLINIC | Age: 79
End: 2019-09-03
Payer: MEDICARE

## 2019-09-03 VITALS
OXYGEN SATURATION: 95 % | HEART RATE: 73 BPM | BODY MASS INDEX: 31.61 KG/M2 | SYSTOLIC BLOOD PRESSURE: 124 MMHG | DIASTOLIC BLOOD PRESSURE: 82 MMHG | WEIGHT: 233.4 LBS | HEIGHT: 72 IN

## 2019-09-03 DIAGNOSIS — I65.23 BILATERAL CAROTID ARTERY STENOSIS: ICD-10-CM

## 2019-09-03 DIAGNOSIS — I25.10 CORONARY ARTERY DISEASE INVOLVING NATIVE CORONARY ARTERY OF NATIVE HEART WITHOUT ANGINA PECTORIS: Primary | ICD-10-CM

## 2019-09-03 DIAGNOSIS — E78.5 HYPERLIPIDEMIA, UNSPECIFIED HYPERLIPIDEMIA TYPE: ICD-10-CM

## 2019-09-03 DIAGNOSIS — Z98.890 STATUS POST AAA (ABDOMINAL AORTIC ANEURYSM) REPAIR: ICD-10-CM

## 2019-09-03 DIAGNOSIS — I50.22 CHRONIC SYSTOLIC HEART FAILURE (HCC): ICD-10-CM

## 2019-09-03 DIAGNOSIS — I10 ESSENTIAL HYPERTENSION: ICD-10-CM

## 2019-09-03 DIAGNOSIS — Z86.79 STATUS POST AAA (ABDOMINAL AORTIC ANEURYSM) REPAIR: ICD-10-CM

## 2019-09-03 PROCEDURE — G8427 DOCREV CUR MEDS BY ELIG CLIN: HCPCS | Performed by: INTERNAL MEDICINE

## 2019-09-03 PROCEDURE — G8417 CALC BMI ABV UP PARAM F/U: HCPCS | Performed by: INTERNAL MEDICINE

## 2019-09-03 PROCEDURE — 1036F TOBACCO NON-USER: CPT | Performed by: INTERNAL MEDICINE

## 2019-09-03 PROCEDURE — 99214 OFFICE O/P EST MOD 30 MIN: CPT | Performed by: INTERNAL MEDICINE

## 2019-09-03 PROCEDURE — 4040F PNEUMOC VAC/ADMIN/RCVD: CPT | Performed by: INTERNAL MEDICINE

## 2019-09-03 PROCEDURE — 1123F ACP DISCUSS/DSCN MKR DOCD: CPT | Performed by: INTERNAL MEDICINE

## 2019-09-03 PROCEDURE — G8598 ASA/ANTIPLAT THER USED: HCPCS | Performed by: INTERNAL MEDICINE

## 2019-09-13 ENCOUNTER — OFFICE VISIT (OUTPATIENT)
Dept: FAMILY MEDICINE CLINIC | Age: 79
End: 2019-09-13
Payer: MEDICARE

## 2019-09-13 VITALS
RESPIRATION RATE: 16 BRPM | HEIGHT: 72 IN | HEART RATE: 71 BPM | WEIGHT: 236 LBS | OXYGEN SATURATION: 98 % | DIASTOLIC BLOOD PRESSURE: 80 MMHG | BODY MASS INDEX: 31.97 KG/M2 | SYSTOLIC BLOOD PRESSURE: 120 MMHG | TEMPERATURE: 97.5 F

## 2019-09-13 DIAGNOSIS — Z01.818 PRE-OP EXAM: Primary | ICD-10-CM

## 2019-09-13 PROCEDURE — 99213 OFFICE O/P EST LOW 20 MIN: CPT | Performed by: FAMILY MEDICINE

## 2019-09-13 PROCEDURE — G8417 CALC BMI ABV UP PARAM F/U: HCPCS | Performed by: FAMILY MEDICINE

## 2019-09-13 PROCEDURE — G8427 DOCREV CUR MEDS BY ELIG CLIN: HCPCS | Performed by: FAMILY MEDICINE

## 2019-09-13 NOTE — PROGRESS NOTES
general anesthesia     slow to wake up    Spinal stenosis of lumbar region 1/29/2014    Status post AAA (abdominal aortic aneurysm) repair 11/10/2014       Current Outpatient Medications   Medication Sig Dispense Refill    tamsulosin (FLOMAX) 0.4 MG capsule TAKE 1 CAPSULE EVERY DAY 90 capsule 3    hydrochlorothiazide (HYDRODIURIL) 25 MG tablet TAKE 1 TABLET EVERY DAY 90 tablet 1    metFORMIN (GLUCOPHAGE) 1000 MG tablet TAKE 1 TABLET TWICE DAILY WITH MEALS 180 tablet 1    clopidogrel (PLAVIX) 75 MG tablet TAKE 1 TABLET EVERY DAY (NEED MD APPOINTMENT FOR MORE REFILLS) 90 tablet 2    blood glucose test strips (TRUETEST TEST) strip 1 each by In Vitro route daily As needed.  100 each 5    atorvastatin (LIPITOR) 80 MG tablet TAKE 1 TABLET EVERY DAY 90 tablet 3    metoprolol succinate (TOPROL XL) 25 MG extended release tablet TAKE 1 TABLET EVERY NIGHT 90 tablet 3    lisinopril (PRINIVIL;ZESTRIL) 20 MG tablet TAKE 1 TABLET EVERY DAY 90 tablet 3    TRUEPLUS LANCETS 33G MISC CHECK BLOOD SUGAR 4 TIMES PER DAY AS NEEDED (Patient taking differently: CHECK BLOOD SUGAR 1 TIME PER DAY AS NEEDED) 400 each 5    amLODIPine (NORVASC) 10 MG tablet Take 1 tablet by mouth daily 30 tablet 5    SITagliptin (JANUVIA) 25 MG tablet Take 1 tablet by mouth daily 30 tablet 2    Blood Glucose Monitoring Suppl (TRUE METRIX AIR GLUCOSE METER) NATASHA Check blood sugar as needed 1 Device 0    nitroGLYCERIN (NITROSTAT) 0.4 MG SL tablet Place 1 tablet under the tongue every 5 minutes as needed for Chest pain (Patient taking differently: Place 0.4 mg under the tongue every 5 minutes as needed for Chest pain Never had to use it) 25 tablet 3    aspirin 81 MG tablet Take 81 mg by mouth daily      ibuprofen (ADVIL) 200 MG CAPS Take 2 capsules by mouth 3 times daily       Current Facility-Administered Medications   Medication Dose Route Frequency Provider Last Rate Last Dose    lidocaine PF 1 % injection 3 mL  3 mL Intradermal Once Dagoberto Wilcox

## 2019-09-18 ENCOUNTER — HOSPITAL ENCOUNTER (OUTPATIENT)
Dept: PREADMISSION TESTING | Age: 79
Discharge: HOME | End: 2019-09-22
Payer: MEDICARE

## 2019-09-18 LAB
A/G RATIO: 1.5 (ref 1.1–2.2)
ABO/RH: NORMAL
ALBUMIN SERPL-MCNC: 4.8 G/DL (ref 3.4–5)
ALP BLD-CCNC: 106 U/L (ref 40–129)
ALT SERPL-CCNC: 15 U/L (ref 10–40)
ANION GAP SERPL CALCULATED.3IONS-SCNC: 14 MMOL/L (ref 3–16)
ANTIBODY SCREEN: NORMAL
APTT: 30.5 SEC (ref 26–36)
AST SERPL-CCNC: 19 U/L (ref 15–37)
BASOPHILS ABSOLUTE: 0.1 K/UL (ref 0–0.2)
BASOPHILS RELATIVE PERCENT: 0.9 %
BILIRUB SERPL-MCNC: 0.7 MG/DL (ref 0–1)
BUN BLDV-MCNC: 21 MG/DL (ref 7–20)
C-REACTIVE PROTEIN: <0.3 MG/L (ref 0–5.1)
CALCIUM SERPL-MCNC: 10.1 MG/DL (ref 8.3–10.6)
CHLORIDE BLD-SCNC: 101 MMOL/L (ref 99–110)
CO2: 27 MMOL/L (ref 21–32)
CREAT SERPL-MCNC: 1.2 MG/DL (ref 0.8–1.3)
EKG ATRIAL RATE: 71 BPM
EKG DIAGNOSIS: NORMAL
EKG P AXIS: 86 DEGREES
EKG P-R INTERVAL: 176 MS
EKG Q-T INTERVAL: 424 MS
EKG QRS DURATION: 148 MS
EKG QTC CALCULATION (BAZETT): 460 MS
EKG R AXIS: -15 DEGREES
EKG T AXIS: 24 DEGREES
EKG VENTRICULAR RATE: 71 BPM
EOSINOPHILS ABSOLUTE: 0.3 K/UL (ref 0–0.6)
EOSINOPHILS RELATIVE PERCENT: 5.1 %
GFR AFRICAN AMERICAN: >60
GFR NON-AFRICAN AMERICAN: 58
GLOBULIN: 3.3 G/DL
GLUCOSE BLD-MCNC: 124 MG/DL (ref 70–99)
HCT VFR BLD CALC: 43.3 % (ref 40.5–52.5)
HEMOGLOBIN: 14.7 G/DL (ref 13.5–17.5)
INR BLD: 0.9 (ref 0.86–1.14)
LYMPHOCYTES ABSOLUTE: 1.6 K/UL (ref 1–5.1)
LYMPHOCYTES RELATIVE PERCENT: 29.3 %
MCH RBC QN AUTO: 32.6 PG (ref 26–34)
MCHC RBC AUTO-ENTMCNC: 33.9 G/DL (ref 31–36)
MCV RBC AUTO: 96.1 FL (ref 80–100)
MONOCYTES ABSOLUTE: 0.4 K/UL (ref 0–1.3)
MONOCYTES RELATIVE PERCENT: 6.5 %
NEUTROPHILS ABSOLUTE: 3.2 K/UL (ref 1.7–7.7)
NEUTROPHILS RELATIVE PERCENT: 58.2 %
PDW BLD-RTO: 14.3 % (ref 12.4–15.4)
PLATELET # BLD: 163 K/UL (ref 135–450)
PMV BLD AUTO: 9.2 FL (ref 5–10.5)
POTASSIUM SERPL-SCNC: 4.7 MMOL/L (ref 3.5–5.1)
PROTHROMBIN TIME: 10.3 SEC (ref 9.8–13)
RBC # BLD: 4.51 M/UL (ref 4.2–5.9)
SEDIMENTATION RATE, ERYTHROCYTE: 12 MM/HR (ref 0–20)
SODIUM BLD-SCNC: 142 MMOL/L (ref 136–145)
TOTAL PROTEIN: 8.1 G/DL (ref 6.4–8.2)
WBC # BLD: 5.6 K/UL (ref 4–11)

## 2019-09-18 PROCEDURE — 86850 RBC ANTIBODY SCREEN: CPT

## 2019-09-18 PROCEDURE — 93010 ELECTROCARDIOGRAM REPORT: CPT | Performed by: INTERNAL MEDICINE

## 2019-09-18 PROCEDURE — 93005 ELECTROCARDIOGRAM TRACING: CPT | Performed by: ORTHOPAEDIC SURGERY

## 2019-09-18 PROCEDURE — 86140 C-REACTIVE PROTEIN: CPT

## 2019-09-18 PROCEDURE — 85652 RBC SED RATE AUTOMATED: CPT

## 2019-09-18 PROCEDURE — 80053 COMPREHEN METABOLIC PANEL: CPT

## 2019-09-18 PROCEDURE — 85610 PROTHROMBIN TIME: CPT

## 2019-09-18 PROCEDURE — 85025 COMPLETE CBC W/AUTO DIFF WBC: CPT

## 2019-09-18 PROCEDURE — 87641 MR-STAPH DNA AMP PROBE: CPT

## 2019-09-18 PROCEDURE — 86900 BLOOD TYPING SEROLOGIC ABO: CPT

## 2019-09-18 PROCEDURE — 86901 BLOOD TYPING SEROLOGIC RH(D): CPT

## 2019-09-18 PROCEDURE — 85730 THROMBOPLASTIN TIME PARTIAL: CPT

## 2019-09-18 RX ORDER — SULINDAC 150 MG/1
TABLET ORAL
Status: ON HOLD | COMMUNITY
Start: 2019-09-16 | End: 2019-09-25 | Stop reason: HOSPADM

## 2019-09-18 RX ORDER — GABAPENTIN 300 MG/1
300 CAPSULE ORAL DAILY
COMMUNITY
Start: 2019-09-16 | End: 2022-02-18

## 2019-09-18 RX ORDER — CEFADROXIL 500 MG/1
CAPSULE ORAL
Status: ON HOLD | COMMUNITY
Start: 2019-09-16 | End: 2019-09-23

## 2019-09-18 RX ORDER — DULOXETIN HYDROCHLORIDE 30 MG/1
CAPSULE, DELAYED RELEASE ORAL
COMMUNITY
Start: 2019-09-16 | End: 2022-02-18

## 2019-09-18 NOTE — PROGRESS NOTES
[facility]                          Date of Procedure 9-23     PRIOR TO PROCEDURE DATE:  1. Please follow any guidelines/instructions prior to your procedure as advised by your surgeon. 2. Arrange for someone to drive you home and be with you for the first 24 hours after discharge for your safety after your procedure for which you received sedation. Ensure it is someone we can share information with regarding your discharge. 3. You must contact your surgeon for instructions IF:   You are taking any blood thinners, aspirin, anti-inflammatory or vitamin E.   There is a change in your physical condition such as a cold, fever, rash, cuts, sores or any other infection, especially near your surgical site. 4. Do not drink alcohol the day before or day of your procedure. 5. A Pre-op History and Physical for surgery MUST be completed by your Physician or Urgent Care within 30 days of your procedure date. Please bring a copy with you on the day of your procedure and along with any other testing performed. THE DAY OF YOUR PROCEDURE:  1. Follow instructions for ARRIVAL TIME as DIRECTED BY YOUR SURGEON. 2. Enter the MAIN entrance from [employer] and follow the signs to the free [employer] [facility] or [facility] parking (offered free of charge 6am-5pm). 3. Enter the Main Entrance of the hospital (do NOT enter from the lower level of the parking garage). Upon entrance, check in with the  at the main desk on your left. If no one is available at the desk, proceed into the [facility] Waiting Room and go through the door directly into the [facility]. There is a Check-in desk ACROSS from Room 5 (marked with a sign hanging from the ceiling). The phone number for the surgery center is [phone].    4. DO NOT EAT ANYTHING eight hours prior to surgery.   May have 8 ounces of water 4 hours prior to surgery (exception would be medication instructions SPIROMETER BACK WITH YOU ON THE DAY OF YOUR SURGERY    No CMS Comprehensive Care for Joint Replacement Model Notification Letter  Yes Your Guide to Hip Replacement Surgery. PLEASE BRING THIS BOOKLET BACK ON THE DAY OF YOUR SURGERY. No Your Guide to Knee Replacement Surgery. PLEASE BRING THIS BOOKLET BACK ON THE DAY OF YOUR SURGERY. No Your Guide to Shoulder Replacement Surgery. PLEASE BRING THIS BOOKLET BACK ON THE DAY OF YOUR SURGERY.   Yes  Reviewed/Given handout for TJ Video/Class    No Other

## 2019-09-19 VITALS
DIASTOLIC BLOOD PRESSURE: 85 MMHG | OXYGEN SATURATION: 98 % | BODY MASS INDEX: 31.29 KG/M2 | HEART RATE: 53 BPM | HEIGHT: 72 IN | WEIGHT: 231 LBS | TEMPERATURE: 96.6 F | RESPIRATION RATE: 16 BRPM | SYSTOLIC BLOOD PRESSURE: 163 MMHG

## 2019-09-19 LAB — MRSA SCREEN RT-PCR: NORMAL

## 2019-09-21 ENCOUNTER — ANESTHESIA EVENT (OUTPATIENT)
Dept: OPERATING ROOM | Age: 79
DRG: 467 | End: 2019-09-21
Payer: MEDICARE

## 2019-09-23 ENCOUNTER — APPOINTMENT (OUTPATIENT)
Dept: GENERAL RADIOLOGY | Age: 79
DRG: 467 | End: 2019-09-23
Attending: ORTHOPAEDIC SURGERY
Payer: MEDICARE

## 2019-09-23 ENCOUNTER — HOSPITAL ENCOUNTER (INPATIENT)
Age: 79
LOS: 2 days | Discharge: HOME | DRG: 467 | End: 2019-09-25
Attending: ORTHOPAEDIC SURGERY | Admitting: ORTHOPAEDIC SURGERY
Payer: MEDICARE

## 2019-09-23 ENCOUNTER — ANESTHESIA (OUTPATIENT)
Dept: OPERATING ROOM | Age: 79
DRG: 467 | End: 2019-09-23
Payer: MEDICARE

## 2019-09-23 VITALS — SYSTOLIC BLOOD PRESSURE: 97 MMHG | DIASTOLIC BLOOD PRESSURE: 53 MMHG | OXYGEN SATURATION: 100 %

## 2019-09-23 DIAGNOSIS — T84.061S: Primary | ICD-10-CM

## 2019-09-23 PROBLEM — Z96.642 COMPLICATION OF INTERNAL LEFT HIP PROSTHESIS (HCC): Status: ACTIVE | Noted: 2019-09-23

## 2019-09-23 PROBLEM — T84.9XXA COMPLICATION OF INTERNAL LEFT HIP PROSTHESIS (HCC): Status: ACTIVE | Noted: 2019-09-23

## 2019-09-23 LAB
ABO/RH: NORMAL
ANTIBODY SCREEN: NORMAL
APPEARANCE FLUID: NORMAL
CELL COUNT FLUID TYPE: NORMAL
CLOT EVALUATION: NORMAL
COLOR FLUID: YELLOW
FLUID PATH CONSULT: NO
GLUCOSE BLD-MCNC: 103 MG/DL (ref 70–99)
GLUCOSE BLD-MCNC: 132 MG/DL (ref 70–99)
LYMPHOCYTES, BODY FLUID: 78 %
MONOCYTE, FLUID: 21 %
NEUTROPHIL, FLUID: 1 %
NUCLEATED CELLS FLUID: 273 /CUMM
NUMBER OF CELLS COUNTED FLUID: 100
PERFORMED ON: ABNORMAL
PERFORMED ON: ABNORMAL
RBC FLUID: 2926 /CUMM

## 2019-09-23 PROCEDURE — 2580000003 HC RX 258: Performed by: ORTHOPAEDIC SURGERY

## 2019-09-23 PROCEDURE — 86850 RBC ANTIBODY SCREEN: CPT

## 2019-09-23 PROCEDURE — 6360000002 HC RX W HCPCS: Performed by: ORTHOPAEDIC SURGERY

## 2019-09-23 PROCEDURE — 3600000005 HC SURGERY LEVEL 5 BASE: Performed by: ORTHOPAEDIC SURGERY

## 2019-09-23 PROCEDURE — 2500000003 HC RX 250 WO HCPCS: Performed by: NURSE ANESTHETIST, CERTIFIED REGISTERED

## 2019-09-23 PROCEDURE — 6370000000 HC RX 637 (ALT 250 FOR IP): Performed by: ORTHOPAEDIC SURGERY

## 2019-09-23 PROCEDURE — 6360000002 HC RX W HCPCS: Performed by: ANESTHESIOLOGY

## 2019-09-23 PROCEDURE — 0SRB0JZ REPLACEMENT OF LEFT HIP JOINT WITH SYNTHETIC SUBSTITUTE, OPEN APPROACH: ICD-10-PCS | Performed by: ORTHOPAEDIC SURGERY

## 2019-09-23 PROCEDURE — 0SPB0JZ REMOVAL OF SYNTHETIC SUBSTITUTE FROM LEFT HIP JOINT, OPEN APPROACH: ICD-10-PCS | Performed by: ORTHOPAEDIC SURGERY

## 2019-09-23 PROCEDURE — 7100000001 HC PACU RECOVERY - ADDTL 15 MIN: Performed by: ORTHOPAEDIC SURGERY

## 2019-09-23 PROCEDURE — 72170 X-RAY EXAM OF PELVIS: CPT

## 2019-09-23 PROCEDURE — 2709999900 HC NON-CHARGEABLE SUPPLY: Performed by: ORTHOPAEDIC SURGERY

## 2019-09-23 PROCEDURE — 3700000001 HC ADD 15 MINUTES (ANESTHESIA): Performed by: ORTHOPAEDIC SURGERY

## 2019-09-23 PROCEDURE — 94761 N-INVAS EAR/PLS OXIMETRY MLT: CPT

## 2019-09-23 PROCEDURE — 2500000003 HC RX 250 WO HCPCS: Performed by: ANESTHESIOLOGY

## 2019-09-23 PROCEDURE — P9041 ALBUMIN (HUMAN),5%, 50ML: HCPCS | Performed by: NURSE ANESTHETIST, CERTIFIED REGISTERED

## 2019-09-23 PROCEDURE — 2720000010 HC SURG SUPPLY STERILE: Performed by: ORTHOPAEDIC SURGERY

## 2019-09-23 PROCEDURE — 87015 SPECIMEN INFECT AGNT CONCNTJ: CPT

## 2019-09-23 PROCEDURE — 86901 BLOOD TYPING SEROLOGIC RH(D): CPT

## 2019-09-23 PROCEDURE — 87102 FUNGUS ISOLATION CULTURE: CPT

## 2019-09-23 PROCEDURE — 87205 SMEAR GRAM STAIN: CPT

## 2019-09-23 PROCEDURE — 87206 SMEAR FLUORESCENT/ACID STAI: CPT

## 2019-09-23 PROCEDURE — 2580000003 HC RX 258: Performed by: ANESTHESIOLOGY

## 2019-09-23 PROCEDURE — 86900 BLOOD TYPING SEROLOGIC ABO: CPT

## 2019-09-23 PROCEDURE — 2700000000 HC OXYGEN THERAPY PER DAY

## 2019-09-23 PROCEDURE — C1713 ANCHOR/SCREW BN/BN,TIS/BN: HCPCS | Performed by: ORTHOPAEDIC SURGERY

## 2019-09-23 PROCEDURE — 6360000002 HC RX W HCPCS: Performed by: NURSE ANESTHETIST, CERTIFIED REGISTERED

## 2019-09-23 PROCEDURE — 87070 CULTURE OTHR SPECIMN AEROBIC: CPT

## 2019-09-23 PROCEDURE — 3600000015 HC SURGERY LEVEL 5 ADDTL 15MIN: Performed by: ORTHOPAEDIC SURGERY

## 2019-09-23 PROCEDURE — 87116 MYCOBACTERIA CULTURE: CPT

## 2019-09-23 PROCEDURE — 89051 BODY FLUID CELL COUNT: CPT

## 2019-09-23 PROCEDURE — 7100000000 HC PACU RECOVERY - FIRST 15 MIN: Performed by: ORTHOPAEDIC SURGERY

## 2019-09-23 PROCEDURE — 3700000000 HC ANESTHESIA ATTENDED CARE: Performed by: ORTHOPAEDIC SURGERY

## 2019-09-23 PROCEDURE — 1200000000 HC SEMI PRIVATE

## 2019-09-23 PROCEDURE — C1776 JOINT DEVICE (IMPLANTABLE): HCPCS | Performed by: ORTHOPAEDIC SURGERY

## 2019-09-23 DEVICE — POSITIONER HIP COVERSET UPR AND ANTR PELV SUPP HIP GRP: Type: IMPLANTABLE DEVICE | Site: HIP | Status: FUNCTIONAL

## 2019-09-23 DEVICE — IMPLANTABLE DEVICE: Type: IMPLANTABLE DEVICE | Site: HIP | Status: FUNCTIONAL

## 2019-09-23 DEVICE — HEAD FEM DIA36MM -2MM OFFSET 12/14 TAPR ARTC EZ HIP MTL: Type: IMPLANTABLE DEVICE | Site: HIP | Status: FUNCTIONAL

## 2019-09-23 DEVICE — SCREW BNE L40MM DIA6.5MM CANC HIP DOME PINN: Type: IMPLANTABLE DEVICE | Site: HIP | Status: FUNCTIONAL

## 2019-09-23 RX ORDER — HYDROCODONE BITARTRATE AND ACETAMINOPHEN 10; 325 MG/1; MG/1
2 TABLET ORAL EVERY 4 HOURS PRN
Status: DISCONTINUED | OUTPATIENT
Start: 2019-09-23 | End: 2019-09-25 | Stop reason: HOSPADM

## 2019-09-23 RX ORDER — HYDROCHLOROTHIAZIDE 25 MG/1
25 TABLET ORAL DAILY
Status: DISCONTINUED | OUTPATIENT
Start: 2019-09-24 | End: 2019-09-25 | Stop reason: HOSPADM

## 2019-09-23 RX ORDER — MORPHINE SULFATE 2 MG/ML
2 INJECTION, SOLUTION INTRAMUSCULAR; INTRAVENOUS
Status: DISCONTINUED | OUTPATIENT
Start: 2019-09-23 | End: 2019-09-25 | Stop reason: HOSPADM

## 2019-09-23 RX ORDER — LISINOPRIL 20 MG/1
20 TABLET ORAL NIGHTLY
Status: DISCONTINUED | OUTPATIENT
Start: 2019-09-23 | End: 2019-09-25 | Stop reason: HOSPADM

## 2019-09-23 RX ORDER — IBUPROFEN 200 MG
400 TABLET ORAL 3 TIMES DAILY
Status: DISCONTINUED | OUTPATIENT
Start: 2019-09-23 | End: 2019-09-25 | Stop reason: HOSPADM

## 2019-09-23 RX ORDER — VANCOMYCIN HYDROCHLORIDE 500 MG/10ML
INJECTION, POWDER, LYOPHILIZED, FOR SOLUTION INTRAVENOUS PRN
Status: DISCONTINUED | OUTPATIENT
Start: 2019-09-23 | End: 2019-09-23 | Stop reason: ALTCHOICE

## 2019-09-23 RX ORDER — LIDOCAINE HYDROCHLORIDE 10 MG/ML
2 INJECTION, SOLUTION EPIDURAL; INFILTRATION; INTRACAUDAL; PERINEURAL ONCE
Status: DISCONTINUED | OUTPATIENT
Start: 2019-09-23 | End: 2019-09-23 | Stop reason: HOSPADM

## 2019-09-23 RX ORDER — MEPERIDINE HYDROCHLORIDE 25 MG/ML
12.5 INJECTION INTRAMUSCULAR; INTRAVENOUS; SUBCUTANEOUS EVERY 5 MIN PRN
Status: DISCONTINUED | OUTPATIENT
Start: 2019-09-23 | End: 2019-09-23 | Stop reason: HOSPADM

## 2019-09-23 RX ORDER — FAMOTIDINE 20 MG/1
10 TABLET, FILM COATED ORAL 2 TIMES DAILY PRN
Status: DISCONTINUED | OUTPATIENT
Start: 2019-09-23 | End: 2019-09-25 | Stop reason: HOSPADM

## 2019-09-23 RX ORDER — EPHEDRINE SULFATE 50 MG/ML
INJECTION INTRAVENOUS PRN
Status: DISCONTINUED | OUTPATIENT
Start: 2019-09-23 | End: 2019-09-23 | Stop reason: SDUPTHER

## 2019-09-23 RX ORDER — HYDROCODONE BITARTRATE AND ACETAMINOPHEN 5; 325 MG/1; MG/1
1 TABLET ORAL EVERY 4 HOURS PRN
Qty: 42 TABLET | Refills: 0 | Status: SHIPPED | OUTPATIENT
Start: 2019-09-23 | End: 2019-09-30

## 2019-09-23 RX ORDER — FENTANYL CITRATE 50 UG/ML
50 INJECTION, SOLUTION INTRAMUSCULAR; INTRAVENOUS EVERY 5 MIN PRN
Status: DISCONTINUED | OUTPATIENT
Start: 2019-09-23 | End: 2019-09-23 | Stop reason: HOSPADM

## 2019-09-23 RX ORDER — PROMETHAZINE HYDROCHLORIDE 25 MG/ML
6.25 INJECTION, SOLUTION INTRAMUSCULAR; INTRAVENOUS
Status: DISCONTINUED | OUTPATIENT
Start: 2019-09-23 | End: 2019-09-23 | Stop reason: HOSPADM

## 2019-09-23 RX ORDER — FENTANYL CITRATE 50 UG/ML
INJECTION, SOLUTION INTRAMUSCULAR; INTRAVENOUS PRN
Status: DISCONTINUED | OUTPATIENT
Start: 2019-09-23 | End: 2019-09-23 | Stop reason: SDUPTHER

## 2019-09-23 RX ORDER — MAGNESIUM HYDROXIDE 1200 MG/15ML
LIQUID ORAL CONTINUOUS PRN
Status: COMPLETED | OUTPATIENT
Start: 2019-09-23 | End: 2019-09-23

## 2019-09-23 RX ORDER — HYDROCODONE BITARTRATE AND ACETAMINOPHEN 10; 325 MG/1; MG/1
1 TABLET ORAL EVERY 4 HOURS PRN
Status: DISCONTINUED | OUTPATIENT
Start: 2019-09-23 | End: 2019-09-25 | Stop reason: HOSPADM

## 2019-09-23 RX ORDER — LIDOCAINE HCL/PF 100 MG/5ML
SYRINGE (ML) INJECTION
Status: DISPENSED
Start: 2019-09-23 | End: 2019-09-24

## 2019-09-23 RX ORDER — SODIUM CHLORIDE 9 MG/ML
INJECTION, SOLUTION INTRAVENOUS CONTINUOUS
Status: DISCONTINUED | OUTPATIENT
Start: 2019-09-23 | End: 2019-09-25 | Stop reason: HOSPADM

## 2019-09-23 RX ORDER — HYDRALAZINE HYDROCHLORIDE 20 MG/ML
5 INJECTION INTRAMUSCULAR; INTRAVENOUS EVERY 10 MIN PRN
Status: DISCONTINUED | OUTPATIENT
Start: 2019-09-23 | End: 2019-09-23 | Stop reason: HOSPADM

## 2019-09-23 RX ORDER — ACETAMINOPHEN 325 MG/1
650 TABLET ORAL EVERY 6 HOURS
Status: DISCONTINUED | OUTPATIENT
Start: 2019-09-23 | End: 2019-09-25 | Stop reason: HOSPADM

## 2019-09-23 RX ORDER — ONDANSETRON 2 MG/ML
4 INJECTION INTRAMUSCULAR; INTRAVENOUS EVERY 6 HOURS PRN
Status: DISCONTINUED | OUTPATIENT
Start: 2019-09-23 | End: 2019-09-25 | Stop reason: HOSPADM

## 2019-09-23 RX ORDER — DULOXETIN HYDROCHLORIDE 30 MG/1
30 CAPSULE, DELAYED RELEASE ORAL DAILY
Status: DISCONTINUED | OUTPATIENT
Start: 2019-09-23 | End: 2019-09-25 | Stop reason: HOSPADM

## 2019-09-23 RX ORDER — MIDAZOLAM HYDROCHLORIDE 1 MG/ML
2 INJECTION INTRAMUSCULAR; INTRAVENOUS EVERY 5 MIN PRN
Status: DISCONTINUED | OUTPATIENT
Start: 2019-09-23 | End: 2019-09-23 | Stop reason: HOSPADM

## 2019-09-23 RX ORDER — SODIUM CHLORIDE 0.9 % (FLUSH) 0.9 %
10 SYRINGE (ML) INJECTION PRN
Status: DISCONTINUED | OUTPATIENT
Start: 2019-09-23 | End: 2019-09-25 | Stop reason: HOSPADM

## 2019-09-23 RX ORDER — 0.9 % SODIUM CHLORIDE 0.9 %
500 INTRAVENOUS SOLUTION INTRAVENOUS
Status: DISCONTINUED | OUTPATIENT
Start: 2019-09-23 | End: 2019-09-23 | Stop reason: HOSPADM

## 2019-09-23 RX ORDER — DIPHENHYDRAMINE HYDROCHLORIDE 50 MG/ML
12.5 INJECTION INTRAMUSCULAR; INTRAVENOUS
Status: DISCONTINUED | OUTPATIENT
Start: 2019-09-23 | End: 2019-09-23 | Stop reason: HOSPADM

## 2019-09-23 RX ORDER — SODIUM CHLORIDE 0.9 % (FLUSH) 0.9 %
10 SYRINGE (ML) INJECTION EVERY 12 HOURS SCHEDULED
Status: DISCONTINUED | OUTPATIENT
Start: 2019-09-23 | End: 2019-09-25 | Stop reason: HOSPADM

## 2019-09-23 RX ORDER — GLYCOPYRROLATE 0.2 MG/ML
0.1 INJECTION INTRAMUSCULAR; INTRAVENOUS ONCE
Status: COMPLETED | OUTPATIENT
Start: 2019-09-23 | End: 2019-09-23

## 2019-09-23 RX ORDER — LIDOCAINE HYDROCHLORIDE 20 MG/ML
INJECTION, SOLUTION EPIDURAL; INFILTRATION; INTRACAUDAL; PERINEURAL PRN
Status: DISCONTINUED | OUTPATIENT
Start: 2019-09-23 | End: 2019-09-23 | Stop reason: SDUPTHER

## 2019-09-23 RX ORDER — LABETALOL 20 MG/4 ML (5 MG/ML) INTRAVENOUS SYRINGE
5 EVERY 10 MIN PRN
Status: DISCONTINUED | OUTPATIENT
Start: 2019-09-23 | End: 2019-09-23 | Stop reason: HOSPADM

## 2019-09-23 RX ORDER — CLOPIDOGREL BISULFATE 75 MG/1
75 TABLET ORAL DAILY
Status: DISCONTINUED | OUTPATIENT
Start: 2019-09-24 | End: 2019-09-25 | Stop reason: HOSPADM

## 2019-09-23 RX ORDER — MORPHINE SULFATE 2 MG/ML
4 INJECTION, SOLUTION INTRAMUSCULAR; INTRAVENOUS
Status: DISCONTINUED | OUTPATIENT
Start: 2019-09-23 | End: 2019-09-25 | Stop reason: HOSPADM

## 2019-09-23 RX ORDER — SODIUM CHLORIDE, SODIUM LACTATE, POTASSIUM CHLORIDE, CALCIUM CHLORIDE 600; 310; 30; 20 MG/100ML; MG/100ML; MG/100ML; MG/100ML
INJECTION, SOLUTION INTRAVENOUS CONTINUOUS
Status: DISCONTINUED | OUTPATIENT
Start: 2019-09-23 | End: 2019-09-23

## 2019-09-23 RX ORDER — GABAPENTIN 300 MG/1
300 CAPSULE ORAL NIGHTLY
Status: DISCONTINUED | OUTPATIENT
Start: 2019-09-23 | End: 2019-09-25 | Stop reason: HOSPADM

## 2019-09-23 RX ORDER — EPHEDRINE SULFATE 50 MG/ML
INJECTION, SOLUTION INTRAVENOUS PRN
Status: DISCONTINUED | OUTPATIENT
Start: 2019-09-23 | End: 2019-09-23 | Stop reason: SDUPTHER

## 2019-09-23 RX ORDER — ATORVASTATIN CALCIUM 80 MG/1
80 TABLET, FILM COATED ORAL NIGHTLY
Status: DISCONTINUED | OUTPATIENT
Start: 2019-09-23 | End: 2019-09-25 | Stop reason: HOSPADM

## 2019-09-23 RX ORDER — PROPOFOL 10 MG/ML
INJECTION, EMULSION INTRAVENOUS CONTINUOUS PRN
Status: DISCONTINUED | OUTPATIENT
Start: 2019-09-23 | End: 2019-09-23 | Stop reason: SDUPTHER

## 2019-09-23 RX ORDER — ONDANSETRON 2 MG/ML
4 INJECTION INTRAMUSCULAR; INTRAVENOUS EVERY 30 MIN PRN
Status: DISCONTINUED | OUTPATIENT
Start: 2019-09-23 | End: 2019-09-23 | Stop reason: HOSPADM

## 2019-09-23 RX ORDER — AMLODIPINE BESYLATE 10 MG/1
10 TABLET ORAL NIGHTLY
Status: DISCONTINUED | OUTPATIENT
Start: 2019-09-23 | End: 2019-09-25 | Stop reason: HOSPADM

## 2019-09-23 RX ORDER — DOCUSATE SODIUM 100 MG/1
100 CAPSULE, LIQUID FILLED ORAL 2 TIMES DAILY
Status: DISCONTINUED | OUTPATIENT
Start: 2019-09-23 | End: 2019-09-25 | Stop reason: HOSPADM

## 2019-09-23 RX ORDER — OXYCODONE HYDROCHLORIDE AND ACETAMINOPHEN 5; 325 MG/1; MG/1
1 TABLET ORAL ONCE
Status: DISCONTINUED | OUTPATIENT
Start: 2019-09-23 | End: 2019-09-23 | Stop reason: HOSPADM

## 2019-09-23 RX ORDER — ALBUMIN, HUMAN INJ 5% 5 %
SOLUTION INTRAVENOUS PRN
Status: DISCONTINUED | OUTPATIENT
Start: 2019-09-23 | End: 2019-09-23 | Stop reason: SDUPTHER

## 2019-09-23 RX ORDER — FENTANYL CITRATE 50 UG/ML
25 INJECTION, SOLUTION INTRAMUSCULAR; INTRAVENOUS EVERY 5 MIN PRN
Status: DISCONTINUED | OUTPATIENT
Start: 2019-09-23 | End: 2019-09-23 | Stop reason: HOSPADM

## 2019-09-23 RX ADMIN — EPHEDRINE SULFATE 10 MG: 50 INJECTION INTRAVENOUS at 14:02

## 2019-09-23 RX ADMIN — LIDOCAINE HYDROCHLORIDE 20 MG: 20 INJECTION, SOLUTION EPIDURAL; INFILTRATION; INTRACAUDAL; PERINEURAL at 13:30

## 2019-09-23 RX ADMIN — ALBUMIN (HUMAN) 250 ML: 12.5 INJECTION, SOLUTION INTRAVENOUS at 14:07

## 2019-09-23 RX ADMIN — DULOXETINE HYDROCHLORIDE 30 MG: 30 CAPSULE, DELAYED RELEASE ORAL at 21:15

## 2019-09-23 RX ADMIN — LIDOCAINE HYDROCHLORIDE 60 MG: 20 INJECTION, SOLUTION EPIDURAL; INFILTRATION; INTRACAUDAL; PERINEURAL at 15:18

## 2019-09-23 RX ADMIN — SODIUM CHLORIDE, SODIUM LACTATE, POTASSIUM CHLORIDE, AND CALCIUM CHLORIDE: 600; 310; 30; 20 INJECTION, SOLUTION INTRAVENOUS at 10:30

## 2019-09-23 RX ADMIN — LIDOCAINE HYDROCHLORIDE 100 MG: 20 INJECTION, SOLUTION EPIDURAL; INFILTRATION; INTRACAUDAL; PERINEURAL at 13:14

## 2019-09-23 RX ADMIN — Medication 10 ML: at 21:22

## 2019-09-23 RX ADMIN — ATORVASTATIN CALCIUM 80 MG: 80 TABLET, FILM COATED ORAL at 21:15

## 2019-09-23 RX ADMIN — PHENYLEPHRINE HYDROCHLORIDE 40 MCG: 10 INJECTION, SOLUTION INTRAMUSCULAR; INTRAVENOUS; SUBCUTANEOUS at 15:58

## 2019-09-23 RX ADMIN — EPHEDRINE SULFATE 10 MG: 50 INJECTION INTRAVENOUS at 15:18

## 2019-09-23 RX ADMIN — EPHEDRINE SULFATE 10 MG: 50 INJECTION INTRAVENOUS at 13:43

## 2019-09-23 RX ADMIN — PHENYLEPHRINE HYDROCHLORIDE 40 MCG: 10 INJECTION, SOLUTION INTRAMUSCULAR; INTRAVENOUS; SUBCUTANEOUS at 15:42

## 2019-09-23 RX ADMIN — SODIUM CHLORIDE, SODIUM LACTATE, POTASSIUM CHLORIDE, AND CALCIUM CHLORIDE: 600; 310; 30; 20 INJECTION, SOLUTION INTRAVENOUS at 15:46

## 2019-09-23 RX ADMIN — ACETAMINOPHEN 650 MG: 325 TABLET ORAL at 21:15

## 2019-09-23 RX ADMIN — GLYCOPYRROLATE 0.2 MG: 0.2 INJECTION INTRAMUSCULAR; INTRAVENOUS at 15:41

## 2019-09-23 RX ADMIN — EPHEDRINE SULFATE 10 MG: 50 INJECTION INTRAVENOUS at 13:40

## 2019-09-23 RX ADMIN — CEFAZOLIN 2 G: 10 INJECTION, POWDER, FOR SOLUTION INTRAVENOUS; PARENTERAL at 21:15

## 2019-09-23 RX ADMIN — FENTANYL CITRATE 50 MCG: 50 INJECTION INTRAMUSCULAR; INTRAVENOUS at 15:36

## 2019-09-23 RX ADMIN — SODIUM CHLORIDE 990 ML: 9 INJECTION, SOLUTION INTRAVENOUS at 18:52

## 2019-09-23 RX ADMIN — LIDOCAINE HYDROCHLORIDE 20 MG: 20 INJECTION, SOLUTION EPIDURAL; INFILTRATION; INTRACAUDAL; PERINEURAL at 13:25

## 2019-09-23 RX ADMIN — PROPOFOL 50 MCG/KG/MIN: 10 INJECTION, EMULSION INTRAVENOUS at 13:27

## 2019-09-23 RX ADMIN — FENTANYL CITRATE 25 MCG: 50 INJECTION INTRAMUSCULAR; INTRAVENOUS at 16:10

## 2019-09-23 RX ADMIN — ALBUMIN (HUMAN) 250 ML: 12.5 INJECTION, SOLUTION INTRAVENOUS at 14:33

## 2019-09-23 RX ADMIN — IBUPROFEN 400 MG: 200 TABLET, FILM COATED ORAL at 19:38

## 2019-09-23 RX ADMIN — Medication 2 G: at 13:29

## 2019-09-23 RX ADMIN — MIDAZOLAM 2 MG: 1 INJECTION INTRAMUSCULAR; INTRAVENOUS at 11:15

## 2019-09-23 RX ADMIN — METFORMIN HYDROCHLORIDE 1000 MG: 1000 TABLET ORAL at 21:14

## 2019-09-23 RX ADMIN — FENTANYL CITRATE 25 MCG: 50 INJECTION INTRAMUSCULAR; INTRAVENOUS at 19:20

## 2019-09-23 RX ADMIN — GABAPENTIN 300 MG: 300 CAPSULE ORAL at 21:15

## 2019-09-23 RX ADMIN — LIDOCAINE HYDROCHLORIDE 40 MG: 20 INJECTION, SOLUTION EPIDURAL; INFILTRATION; INTRACAUDAL; PERINEURAL at 14:47

## 2019-09-23 RX ADMIN — DOCUSATE SODIUM 100 MG: 100 CAPSULE, LIQUID FILLED ORAL at 21:15

## 2019-09-23 RX ADMIN — VANCOMYCIN HYDROCHLORIDE 1.5 G: 10 INJECTION, POWDER, LYOPHILIZED, FOR SOLUTION INTRAVENOUS at 13:26

## 2019-09-23 RX ADMIN — LIDOCAINE HYDROCHLORIDE 100 MG: 20 INJECTION, SOLUTION EPIDURAL; INFILTRATION; INTRACAUDAL; PERINEURAL at 13:20

## 2019-09-23 RX ADMIN — LIDOCAINE HYDROCHLORIDE 40 MG: 20 INJECTION, SOLUTION EPIDURAL; INFILTRATION; INTRACAUDAL; PERINEURAL at 15:00

## 2019-09-23 RX ADMIN — FENTANYL CITRATE 50 MCG: 50 INJECTION INTRAMUSCULAR; INTRAVENOUS at 11:15

## 2019-09-23 RX ADMIN — FENTANYL CITRATE 25 MCG: 50 INJECTION INTRAMUSCULAR; INTRAVENOUS at 15:58

## 2019-09-23 RX ADMIN — SODIUM CHLORIDE 600 ML: 9 INJECTION, SOLUTION INTRAVENOUS at 17:36

## 2019-09-23 RX ADMIN — EPHEDRINE SULFATE 10 MG: 50 INJECTION, SOLUTION INTRAMUSCULAR; INTRAVENOUS; SUBCUTANEOUS at 14:18

## 2019-09-23 ASSESSMENT — PAIN DESCRIPTION - ORIENTATION
ORIENTATION: LEFT

## 2019-09-23 ASSESSMENT — PULMONARY FUNCTION TESTS
PIF_VALUE: 0
PIF_VALUE: 1
PIF_VALUE: 0
PIF_VALUE: 1
PIF_VALUE: 1
PIF_VALUE: 0
PIF_VALUE: 1
PIF_VALUE: 0
PIF_VALUE: 0
PIF_VALUE: 1
PIF_VALUE: 3
PIF_VALUE: 0
PIF_VALUE: 1
PIF_VALUE: 0
PIF_VALUE: 1
PIF_VALUE: 0
PIF_VALUE: 1
PIF_VALUE: 0
PIF_VALUE: 1
PIF_VALUE: 0
PIF_VALUE: 1
PIF_VALUE: 0
PIF_VALUE: 1
PIF_VALUE: 0
PIF_VALUE: 1
PIF_VALUE: 0
PIF_VALUE: 1
PIF_VALUE: 0

## 2019-09-23 ASSESSMENT — PAIN SCALES - GENERAL
PAINLEVEL_OUTOF10: 0
PAINLEVEL_OUTOF10: 2
PAINLEVEL_OUTOF10: 2
PAINLEVEL_OUTOF10: 5
PAINLEVEL_OUTOF10: 4
PAINLEVEL_OUTOF10: 4
PAINLEVEL_OUTOF10: 2
PAINLEVEL_OUTOF10: 3
PAINLEVEL_OUTOF10: 3
PAINLEVEL_OUTOF10: 4
PAINLEVEL_OUTOF10: 3
PAINLEVEL_OUTOF10: 4
PAINLEVEL_OUTOF10: 4
PAINLEVEL_OUTOF10: 7
PAINLEVEL_OUTOF10: 3
PAINLEVEL_OUTOF10: 2
PAINLEVEL_OUTOF10: 4
PAINLEVEL_OUTOF10: 0

## 2019-09-23 ASSESSMENT — PAIN DESCRIPTION - FREQUENCY
FREQUENCY: CONTINUOUS

## 2019-09-23 ASSESSMENT — PAIN DESCRIPTION - DESCRIPTORS
DESCRIPTORS: SORE

## 2019-09-23 ASSESSMENT — PAIN DESCRIPTION - LOCATION
LOCATION: HIP

## 2019-09-23 ASSESSMENT — PAIN DESCRIPTION - PAIN TYPE
TYPE: SURGICAL PAIN

## 2019-09-23 ASSESSMENT — PAIN - FUNCTIONAL ASSESSMENT: PAIN_FUNCTIONAL_ASSESSMENT: 0-10

## 2019-09-23 NOTE — ANESTHESIA PRE PROCEDURE
Labs     09/23/19  1027   POCGLU 103*       Coags:   Lab Results   Component Value Date    PROTIME 10.3 09/18/2019    INR 0.90 09/18/2019    APTT 30.5 09/18/2019       HCG (If Applicable): No results found for: PREGTESTUR, PREGSERUM, HCG, HCGQUANT     ABGs: No results found for: PHART, PO2ART, XXZ6FRI, UOZ9BOK, BEART, I5FGJIBZ     Type & Screen (If Applicable):  No results found for: LABABO, [address]    Anesthesia Evaluation  Patient summary reviewed no history of anesthetic complications:   Airway: Mallampati: II  TM distance: >3 FB   Neck ROM: full  Mouth opening: > = 3 FB Dental:    (+) upper dentures and lower dentures      Pulmonary:Negative Pulmonary ROS                              Cardiovascular:    (+) hypertension:, past MI: > 6 months, CAD:, CABG/stent (CABG x4 >10 years ago):,     (-)  angina                Neuro/Psych:   Negative Neuro/Psych ROS              GI/Hepatic/Renal: Neg GI/Hepatic/Renal ROS            Endo/Other:    (+) DiabetesType II DM, , .                 Abdominal:           Vascular: negative vascular ROS. Anesthesia Plan      MAC and epidural     ASA 3       Induction: intravenous. MIPS: Postoperative opioids intended and Prophylactic antiemetics administered. Anesthetic plan and risks discussed with patient. Plan discussed with CRNA.     Attending anesthesiologist reviewed and agrees with Latosha Love MD   9/23/2019

## 2019-09-24 LAB
HCT VFR BLD CALC: 29.3 % (ref 40.5–52.5)
HEMOGLOBIN: 10.1 G/DL (ref 13.5–17.5)

## 2019-09-24 PROCEDURE — 6360000002 HC RX W HCPCS: Performed by: ORTHOPAEDIC SURGERY

## 2019-09-24 PROCEDURE — 6360000002 HC RX W HCPCS

## 2019-09-24 PROCEDURE — 97116 GAIT TRAINING THERAPY: CPT

## 2019-09-24 PROCEDURE — 97162 PT EVAL MOD COMPLEX 30 MIN: CPT

## 2019-09-24 PROCEDURE — 85018 HEMOGLOBIN: CPT

## 2019-09-24 PROCEDURE — 97530 THERAPEUTIC ACTIVITIES: CPT

## 2019-09-24 PROCEDURE — 90686 IIV4 VACC NO PRSV 0.5 ML IM: CPT

## 2019-09-24 PROCEDURE — 97535 SELF CARE MNGMENT TRAINING: CPT

## 2019-09-24 PROCEDURE — 36415 COLL VENOUS BLD VENIPUNCTURE: CPT

## 2019-09-24 PROCEDURE — 85014 HEMATOCRIT: CPT

## 2019-09-24 PROCEDURE — 6370000000 HC RX 637 (ALT 250 FOR IP): Performed by: ORTHOPAEDIC SURGERY

## 2019-09-24 PROCEDURE — 97165 OT EVAL LOW COMPLEX 30 MIN: CPT

## 2019-09-24 PROCEDURE — G0008 ADMIN INFLUENZA VIRUS VAC: HCPCS

## 2019-09-24 PROCEDURE — 1200000000 HC SEMI PRIVATE

## 2019-09-24 PROCEDURE — 2580000003 HC RX 258: Performed by: ORTHOPAEDIC SURGERY

## 2019-09-24 RX ADMIN — DOCUSATE SODIUM 100 MG: 100 CAPSULE, LIQUID FILLED ORAL at 20:39

## 2019-09-24 RX ADMIN — IBUPROFEN 400 MG: 200 TABLET, FILM COATED ORAL at 20:38

## 2019-09-24 RX ADMIN — CEFAZOLIN 2 G: 10 INJECTION, POWDER, FOR SOLUTION INTRAVENOUS; PARENTERAL at 05:08

## 2019-09-24 RX ADMIN — Medication 10 ML: at 20:44

## 2019-09-24 RX ADMIN — ACETAMINOPHEN 650 MG: 325 TABLET ORAL at 20:42

## 2019-09-24 RX ADMIN — METFORMIN HYDROCHLORIDE 1000 MG: 1000 TABLET ORAL at 09:39

## 2019-09-24 RX ADMIN — METFORMIN HYDROCHLORIDE 1000 MG: 1000 TABLET ORAL at 17:45

## 2019-09-24 RX ADMIN — IBUPROFEN 400 MG: 200 TABLET, FILM COATED ORAL at 16:05

## 2019-09-24 RX ADMIN — GABAPENTIN 300 MG: 300 CAPSULE ORAL at 20:38

## 2019-09-24 RX ADMIN — IBUPROFEN 400 MG: 200 TABLET, FILM COATED ORAL at 09:39

## 2019-09-24 RX ADMIN — ATORVASTATIN CALCIUM 80 MG: 80 TABLET, FILM COATED ORAL at 20:38

## 2019-09-24 RX ADMIN — ACETAMINOPHEN 650 MG: 325 TABLET ORAL at 16:04

## 2019-09-24 RX ADMIN — INFLUENZA A VIRUS A/BRISBANE/02/2018 IVR-190 (H1N1) ANTIGEN (PROPIOLACTONE INACTIVATED), INFLUENZA A VIRUS A/KANSAS/14/2017 X-327 (H3N2) ANTIGEN (PROPIOLACTONE INACTIVATED), INFLUENZA B VIRUS B/MARYLAND/15/2016 ANTIGEN (PROPIOLACTONE INACTIVATED), INFLUENZA B VIRUS B/PHUKET/3073/2013 BVR-1B ANTIGEN (PROPIOLACTONE INACTIVATED) 0.5 ML: 15; 15; 15; 15 INJECTION, SUSPENSION INTRAMUSCULAR at 09:41

## 2019-09-24 RX ADMIN — Medication 10 ML: at 09:45

## 2019-09-24 RX ADMIN — ACETAMINOPHEN 650 MG: 325 TABLET ORAL at 09:39

## 2019-09-24 RX ADMIN — CLOPIDOGREL BISULFATE 75 MG: 75 TABLET ORAL at 09:39

## 2019-09-24 RX ADMIN — DOCUSATE SODIUM 100 MG: 100 CAPSULE, LIQUID FILLED ORAL at 09:39

## 2019-09-24 RX ADMIN — DULOXETINE HYDROCHLORIDE 30 MG: 30 CAPSULE, DELAYED RELEASE ORAL at 09:39

## 2019-09-24 RX ADMIN — HYDROCHLOROTHIAZIDE 25 MG: 25 TABLET ORAL at 09:39

## 2019-09-24 ASSESSMENT — PAIN SCALES - GENERAL
PAINLEVEL_OUTOF10: 0

## 2019-09-24 NOTE — ANESTHESIA POSTPROCEDURE EVALUATION
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 Anesthesia Post Evaluation    Patient location during evaluation: PACU  Patient participation: complete - patient participated  Level of consciousness: awake and alert  Pain scale: please refer to nursing notes. Airway patency: patent  Nausea & Vomiting: no nausea and no vomiting  Complications: no  Cardiovascular status: hemodynamically stable and blood pressure returned to baseline  Respiratory status: spontaneous ventilation  Hydration status: stable  Comments: No phone calls received from PACU RN regarding patient.

## 2019-09-25 VITALS
TEMPERATURE: 98.2 F | RESPIRATION RATE: 17 BRPM | OXYGEN SATURATION: 92 % | SYSTOLIC BLOOD PRESSURE: 98 MMHG | HEART RATE: 95 BPM | BODY MASS INDEX: 29.93 KG/M2 | DIASTOLIC BLOOD PRESSURE: 61 MMHG | WEIGHT: 221 LBS | HEIGHT: 72 IN

## 2019-09-25 PROCEDURE — 97116 GAIT TRAINING THERAPY: CPT

## 2019-09-25 PROCEDURE — 97535 SELF CARE MNGMENT TRAINING: CPT

## 2019-09-25 PROCEDURE — 97530 THERAPEUTIC ACTIVITIES: CPT

## 2019-09-25 PROCEDURE — 97110 THERAPEUTIC EXERCISES: CPT

## 2019-09-25 PROCEDURE — 2580000003 HC RX 258: Performed by: ORTHOPAEDIC SURGERY

## 2019-09-25 PROCEDURE — 6370000000 HC RX 637 (ALT 250 FOR IP): Performed by: ORTHOPAEDIC SURGERY

## 2019-09-25 RX ADMIN — ACETAMINOPHEN 650 MG: 325 TABLET ORAL at 08:00

## 2019-09-25 RX ADMIN — METFORMIN HYDROCHLORIDE 1000 MG: 1000 TABLET ORAL at 08:00

## 2019-09-25 RX ADMIN — Medication 10 ML: at 08:00

## 2019-09-25 RX ADMIN — DULOXETINE HYDROCHLORIDE 30 MG: 30 CAPSULE, DELAYED RELEASE ORAL at 08:00

## 2019-09-25 RX ADMIN — IBUPROFEN 400 MG: 200 TABLET, FILM COATED ORAL at 08:00

## 2019-09-25 RX ADMIN — ACETAMINOPHEN 650 MG: 325 TABLET ORAL at 01:58

## 2019-09-25 RX ADMIN — CLOPIDOGREL BISULFATE 75 MG: 75 TABLET ORAL at 08:00

## 2019-09-25 RX ADMIN — HYDROCHLOROTHIAZIDE 25 MG: 25 TABLET ORAL at 08:00

## 2019-09-25 RX ADMIN — DOCUSATE SODIUM 100 MG: 100 CAPSULE, LIQUID FILLED ORAL at 08:00

## 2019-09-25 ASSESSMENT — PAIN DESCRIPTION - ORIENTATION: ORIENTATION: LEFT

## 2019-09-25 ASSESSMENT — PAIN - FUNCTIONAL ASSESSMENT: PAIN_FUNCTIONAL_ASSESSMENT: ACTIVITIES ARE NOT PREVENTED

## 2019-09-25 ASSESSMENT — PAIN DESCRIPTION - LOCATION: LOCATION: HIP

## 2019-09-25 ASSESSMENT — PAIN DESCRIPTION - PAIN TYPE: TYPE: SURGICAL PAIN

## 2019-09-25 ASSESSMENT — PAIN DESCRIPTION - FREQUENCY: FREQUENCY: CONTINUOUS

## 2019-09-25 ASSESSMENT — PAIN DESCRIPTION - ONSET: ONSET: ON-GOING

## 2019-09-25 ASSESSMENT — PAIN SCALES - GENERAL
PAINLEVEL_OUTOF10: 2
PAINLEVEL_OUTOF10: 1
PAINLEVEL_OUTOF10: 3

## 2019-09-25 ASSESSMENT — PAIN DESCRIPTION - DESCRIPTORS: DESCRIPTORS: ACHING

## 2019-09-25 ASSESSMENT — PAIN DESCRIPTION - PROGRESSION: CLINICAL_PROGRESSION: NOT CHANGED

## 2019-09-27 ENCOUNTER — TELEPHONE (OUTPATIENT)
Dept: FAMILY MEDICINE CLINIC | Age: 79
End: 2019-09-27

## 2019-09-28 LAB
ANAEROBIC CULTURE: NORMAL
ANAEROBIC CULTURE: NORMAL
GRAM STAIN RESULT: NORMAL
GRAM STAIN RESULT: NORMAL
WOUND/ABSCESS: NORMAL
WOUND/ABSCESS: NORMAL

## 2019-10-02 RX ORDER — HYDROCHLOROTHIAZIDE 25 MG/1
TABLET ORAL
Qty: 90 TABLET | Refills: 1 | Status: SHIPPED | OUTPATIENT
Start: 2019-10-02 | End: 2020-02-17

## 2019-10-16 DIAGNOSIS — E11.59 TYPE 2 DIABETES MELLITUS WITH VASCULAR DISEASE (HCC): ICD-10-CM

## 2019-10-28 LAB
FUNGUS (MYCOLOGY) CULTURE: NORMAL
FUNGUS STAIN: NORMAL

## 2019-10-29 ENCOUNTER — OFFICE VISIT (OUTPATIENT)
Dept: FAMILY MEDICINE CLINIC | Age: 79
End: 2019-10-29
Payer: MEDICARE

## 2019-10-29 VITALS
BODY MASS INDEX: 30.38 KG/M2 | DIASTOLIC BLOOD PRESSURE: 82 MMHG | WEIGHT: 224 LBS | SYSTOLIC BLOOD PRESSURE: 128 MMHG | HEART RATE: 96 BPM | OXYGEN SATURATION: 100 %

## 2019-10-29 DIAGNOSIS — E11.59 TYPE 2 DIABETES MELLITUS WITH VASCULAR DISEASE (HCC): Primary | ICD-10-CM

## 2019-10-29 DIAGNOSIS — I10 HYPERTENSION, ESSENTIAL: ICD-10-CM

## 2019-10-29 DIAGNOSIS — Z96.642 HISTORY OF TOTAL LEFT HIP ARTHROPLASTY: ICD-10-CM

## 2019-10-29 DIAGNOSIS — R42 DIZZINESS: ICD-10-CM

## 2019-10-29 LAB — HBA1C MFR BLD: 5.9 %

## 2019-10-29 PROCEDURE — G8427 DOCREV CUR MEDS BY ELIG CLIN: HCPCS | Performed by: FAMILY MEDICINE

## 2019-10-29 PROCEDURE — G8417 CALC BMI ABV UP PARAM F/U: HCPCS | Performed by: FAMILY MEDICINE

## 2019-10-29 PROCEDURE — 1036F TOBACCO NON-USER: CPT | Performed by: FAMILY MEDICINE

## 2019-10-29 PROCEDURE — 83036 HEMOGLOBIN GLYCOSYLATED A1C: CPT | Performed by: FAMILY MEDICINE

## 2019-10-29 PROCEDURE — G8482 FLU IMMUNIZE ORDER/ADMIN: HCPCS | Performed by: FAMILY MEDICINE

## 2019-10-29 PROCEDURE — 4040F PNEUMOC VAC/ADMIN/RCVD: CPT | Performed by: FAMILY MEDICINE

## 2019-10-29 PROCEDURE — G8598 ASA/ANTIPLAT THER USED: HCPCS | Performed by: FAMILY MEDICINE

## 2019-10-29 PROCEDURE — 1123F ACP DISCUSS/DSCN MKR DOCD: CPT | Performed by: FAMILY MEDICINE

## 2019-10-29 PROCEDURE — 99214 OFFICE O/P EST MOD 30 MIN: CPT | Performed by: FAMILY MEDICINE

## 2019-10-29 PROCEDURE — 1111F DSCHRG MED/CURRENT MED MERGE: CPT | Performed by: FAMILY MEDICINE

## 2019-11-12 ENCOUNTER — TELEPHONE (OUTPATIENT)
Dept: FAMILY MEDICINE CLINIC | Age: 79
End: 2019-11-12

## 2019-11-12 LAB
AFB CULTURE (MYCOBACTERIA): NORMAL
AFB SMEAR: NORMAL

## 2019-11-21 RX ORDER — CLOPIDOGREL BISULFATE 75 MG/1
TABLET ORAL
Qty: 90 TABLET | Refills: 3 | Status: SHIPPED | OUTPATIENT
Start: 2019-11-21 | End: 2020-11-30 | Stop reason: SDUPTHER

## 2019-12-06 ENCOUNTER — TELEPHONE (OUTPATIENT)
Dept: FAMILY MEDICINE CLINIC | Age: 79
End: 2019-12-06

## 2020-01-20 RX ORDER — ATORVASTATIN CALCIUM 80 MG/1
TABLET, FILM COATED ORAL
Qty: 90 TABLET | Refills: 0 | Status: SHIPPED | OUTPATIENT
Start: 2020-01-20 | End: 2020-03-25

## 2020-01-20 RX ORDER — METOPROLOL SUCCINATE 25 MG/1
TABLET, EXTENDED RELEASE ORAL
Qty: 90 TABLET | Refills: 0 | Status: SHIPPED | OUTPATIENT
Start: 2020-01-20 | End: 2020-03-25

## 2020-02-17 RX ORDER — HYDROCHLOROTHIAZIDE 25 MG/1
TABLET ORAL
Qty: 90 TABLET | Refills: 1 | Status: SHIPPED | OUTPATIENT
Start: 2020-02-17 | End: 2020-06-20

## 2020-02-17 NOTE — TELEPHONE ENCOUNTER
Medication:   Requested Prescriptions     Pending Prescriptions Disp Refills    hydrochlorothiazide (HYDRODIURIL) 25 MG tablet [Pharmacy Med Name: HYDROCHLOROTHIAZIDE 25 MG Tablet] 90 tablet 1     Sig: TAKE 1 TABLET EVERY DAY       Last Filled:  10/02/2019 #90 1 rf     Patient Phone Number: [phone] (home)     Last appt: 10/29/2019   Next appt: 4/29/2020    Lab Results   Component Value Date     09/18/2019    K 4.7 09/18/2019     09/18/2019    CO2 27 09/18/2019    BUN 21 (H) 09/18/2019    CREATININE 1.2 09/18/2019    GLUCOSE 124 (H) 09/18/2019    CALCIUM 10.1 09/18/2019    PROT 8.1 09/18/2019    LABALBU 4.8 09/18/2019    BILITOT 0.7 09/18/2019    ALKPHOS 106 09/18/2019    AST 19 09/18/2019    ALT 15 09/18/2019    LABGLOM 58 (A) 09/18/2019    GFRAA >60 09/18/2019    AGRATIO 1.5 09/18/2019    GLOB 3.3 09/18/2019

## 2020-02-28 NOTE — PROGRESS NOTES
[address]   Cardiac Follow Up     Referring Provider:  Uzma Keith MD     Chief Complaint   Patient presents with    Hypertension    Congestive Heart Failure    Coronary Artery Disease    Numbness     in rt arm off and on when sitting down        History of Present Illness:  Israel Michele is a 78 y.o. male with a history of CAD/ s/p CABG 02/ hypertension/ and hyperlipidemia AAA s/p repair '09 and carotid stenosis s/p LCE '06. Today, he reports he has recovered well from hip surgery in September. He states when he gets \"tired\" he will have occasional episodes of chest tightness. He has never had to take Nitro. He stays active as possible, walks his dog daily. Denies MAGANA/PND, palpitations, light-headedness, edema. Past Medical History:   has a past medical history of Acquired spondylolisthesis, Arthritis, CAD (coronary artery disease), Carotid disease, bilateral ([address] Cholelithiasis, Chronic systolic heart failure ([address] Diabetes mellitus type II, Essential hypertension, Hyperlipidemia, Obesity (BMI 30.0-34.9), Prolonged emergence from general anesthesia, Spinal stenosis of lumbar region, and Status post AAA (abdominal aortic aneurysm) repair. Surgical History:   has a past surgical history that includes Umbilical hernia repair (1987); Hip Arthroplasty (Left, 1999); Carotid endarterectomy (Left, 2006); AAA repair, endovascular (2009); Coronary artery bypass graft (2002); Vasectomy (1975); cyst removal (Right, 1997); Thyroid surgery (2012); Retinal detachment surgery (Left, 2004); Coronary angioplasty with stent (2009); Rotator cuff repair (Left, 2/24/14); Inguinal hernia repair (Left, 1985); Elbow surgery (Left, 1984); Total knee arthroplasty (Right, 09/15/2015); other surgical history (03/06/2018); and Revision total hip arthroplasty (Left, 9/23/2019). Social History:   reports that he quit smoking about 48 years ago. His smoking use included cigarettes.  He has a 45.00 nitroGLYCERIN (NITROSTAT) 0.4 MG SL tablet Place 1 tablet under the tongue every 5 minutes as needed for Chest pain  Patient taking differently: Place 0.4 mg under the tongue every 5 minutes as needed for Chest pain Never had to use it 5/18/17  Yes Elroy Blizzard, MD   aspirin 81 MG tablet Take 81 mg by mouth daily   Yes Historical Provider, MD   ibuprofen (ADVIL) 200 MG CAPS Take 2 capsules by mouth 3 times daily   Yes Historical Provider, MD        Allergies:  Latex and Iodine     Review of Systems:   · Constitutional: there has been no unanticipated weight loss. There's been no change in energy level, sleep pattern, or activity level. · Eyes: No visual changes or diplopia. No scleral icterus. · ENT: No Headaches, hearing loss or vertigo. No mouth sores or sore throat. · Cardiovascular: Reviewed in HPI  · Respiratory: No cough or wheezing, no sputum production. No hematemesis. · Gastrointestinal: No abdominal pain, appetite loss, blood in stools. No change in bowel or bladder habits. · Genitourinary: No dysuria, trouble voiding, or hematuria. · Musculoskeletal:  No gait disturbance, weakness or joint complaints. · Integumentary: No rash or pruritis. · Neurological: No headache, diplopia, change in muscle strength, numbness or tingling. No change in gait, balance, coordination, mood, affect, memory, mentation, behavior. · Psychiatric: No anxiety, no depression. · Endocrine: No malaise, fatigue or temperature intolerance. No excessive thirst, fluid intake, or urination. No tremor. · Hematologic/Lymphatic: No abnormal bruising or bleeding, blood clots or swollen lymph nodes. · Allergic/Immunologic: No nasal congestion or hives. Physical Examination:    Vitals:    03/09/20 0807   BP: (!) 176/90   Pulse:          Wt Readings from Last 1 Encounters:   03/09/20 240 lb 1.6 oz (108.9 kg)       Constitutional and General Appearance: NAD  Skin:good turgor,intact without lesions  HEENT: EOMI ,normal  Neck:no JVD    Respiratory:  · Normal excursion and expansion without use of accessory muscles  · Resp Auscultation: Normal breath sounds without dullness  Cardiovascular:  · The apical impulses not displaced  · Heart tones are crisp and normal  · Cervical veins are not engorged  · The carotid upstroke is normal in amplitude and contour without delay or bruit  · Peripheral pulses are symmetrical and full  · There is no clubbing, cyanosis of the extremities. · No edema  · Femoral Arteries: 2+ and equal  · Pedal Pulses: 2+ and equal   Abdomen:  · No masses or tenderness  · Liver/Spleen: No Abnormalities Noted  Neurological/Psychiatric:  · Alert and oriented in all spheres  · Moves all extremities well  · Exhibits normal gait balance and coordination  · No abnormalities of mood, affect, memory, mentation, or behavior are noted      Assessment:    1. Coronary artery disease  ~s/p CABG '02     Patient denies any anginal symptoms     Plan: continue ASA, Plavix, Lisinopril, Toprol XL, and Lipitor      2. Hypertension      Blood pressure (!) 176/90, pulse 68, height 6' (1.829 m), weight 240 lb 1.6 oz (108.9 kg). Stable, continue current medications , recheck by me 134/80     3. Hyperlipidemia      4/19: Chol Fasting 125,  HDL: 39, LDL: 66,    Plan: continue Lipitor 80 mg daily         4. AAA--1/14/16  CT--mild aneurysmal dilation ascending aorta 4.8 cm similar to prior study. S/p aortic stent graft      Followed per PCP      5. Carotid disease-- stable - LCE '06   1/16   Right--16-49%  Left 1-15% --stable      Plan:    Tim Barrios has a stable cardiac status. Lab results reviewed this visit and discussed. No med changes. Slip for Lipids, CBC, BMP, ALT, and AST given. Continue risk factor modifications. Call for any change in symptoms. Return for regular follow up in 6 months. I appreciate the opportunity of cooperating in the care of this individual.    Nancy Barnes.  Andre Siu M.D.,

## 2020-03-09 ENCOUNTER — OFFICE VISIT (OUTPATIENT)
Dept: CARDIOLOGY CLINIC | Age: 80
End: 2020-03-09
Payer: MEDICARE

## 2020-03-09 VITALS
HEART RATE: 68 BPM | BODY MASS INDEX: 32.52 KG/M2 | WEIGHT: 240.1 LBS | DIASTOLIC BLOOD PRESSURE: 80 MMHG | HEIGHT: 72 IN | SYSTOLIC BLOOD PRESSURE: 134 MMHG

## 2020-03-09 PROCEDURE — 4040F PNEUMOC VAC/ADMIN/RCVD: CPT | Performed by: INTERNAL MEDICINE

## 2020-03-09 PROCEDURE — G8417 CALC BMI ABV UP PARAM F/U: HCPCS | Performed by: INTERNAL MEDICINE

## 2020-03-09 PROCEDURE — G8427 DOCREV CUR MEDS BY ELIG CLIN: HCPCS | Performed by: INTERNAL MEDICINE

## 2020-03-09 PROCEDURE — 99214 OFFICE O/P EST MOD 30 MIN: CPT | Performed by: INTERNAL MEDICINE

## 2020-03-09 PROCEDURE — 1036F TOBACCO NON-USER: CPT | Performed by: INTERNAL MEDICINE

## 2020-03-09 PROCEDURE — 1123F ACP DISCUSS/DSCN MKR DOCD: CPT | Performed by: INTERNAL MEDICINE

## 2020-03-09 PROCEDURE — G8482 FLU IMMUNIZE ORDER/ADMIN: HCPCS | Performed by: INTERNAL MEDICINE

## 2020-03-09 RX ORDER — NITROGLYCERIN 0.4 MG/1
0.4 TABLET SUBLINGUAL EVERY 5 MIN PRN
Qty: 25 TABLET | Refills: 3 | Status: SHIPPED | OUTPATIENT
Start: 2020-03-09

## 2020-03-10 ENCOUNTER — HOSPITAL ENCOUNTER (OUTPATIENT)
Age: 80
Discharge: HOME | End: 2020-03-10
Payer: MEDICARE

## 2020-03-10 LAB
ALBUMIN SERPL-MCNC: 3.9 G/DL (ref 3.4–5)
ALT SERPL-CCNC: 10 U/L (ref 10–40)
ANION GAP SERPL CALCULATED.3IONS-SCNC: 12 MMOL/L (ref 3–16)
AST SERPL-CCNC: 15 U/L (ref 15–37)
BASOPHILS ABSOLUTE: 0 K/UL (ref 0–0.2)
BASOPHILS RELATIVE PERCENT: 0.7 %
BUN BLDV-MCNC: 18 MG/DL (ref 7–20)
CALCIUM SERPL-MCNC: 9.2 MG/DL (ref 8.3–10.6)
CHLORIDE BLD-SCNC: 101 MMOL/L (ref 99–110)
CHOLESTEROL, TOTAL: 150 MG/DL (ref 0–199)
CO2: 25 MMOL/L (ref 21–32)
CREAT SERPL-MCNC: 1.2 MG/DL (ref 0.8–1.3)
EOSINOPHILS ABSOLUTE: 0.4 K/UL (ref 0–0.6)
EOSINOPHILS RELATIVE PERCENT: 6.7 %
GFR AFRICAN AMERICAN: >60
GFR NON-AFRICAN AMERICAN: 58
GLUCOSE BLD-MCNC: 106 MG/DL (ref 70–99)
HCT VFR BLD CALC: 41.6 % (ref 40.5–52.5)
HDLC SERPL-MCNC: 38 MG/DL (ref 40–60)
HEMOGLOBIN: 13.8 G/DL (ref 13.5–17.5)
LDL CHOLESTEROL CALCULATED: 87 MG/DL
LYMPHOCYTES ABSOLUTE: 1.8 K/UL (ref 1–5.1)
LYMPHOCYTES RELATIVE PERCENT: 33.2 %
MCH RBC QN AUTO: 31.7 PG (ref 26–34)
MCHC RBC AUTO-ENTMCNC: 33.3 G/DL (ref 31–36)
MCV RBC AUTO: 95.2 FL (ref 80–100)
MONOCYTES ABSOLUTE: 0.3 K/UL (ref 0–1.3)
MONOCYTES RELATIVE PERCENT: 6.5 %
NEUTROPHILS ABSOLUTE: 2.8 K/UL (ref 1.7–7.7)
NEUTROPHILS RELATIVE PERCENT: 52.9 %
PDW BLD-RTO: 14 % (ref 12.4–15.4)
PHOSPHORUS: 3.1 MG/DL (ref 2.5–4.9)
PLATELET # BLD: 146 K/UL (ref 135–450)
PMV BLD AUTO: 10.3 FL (ref 5–10.5)
POTASSIUM SERPL-SCNC: 4.1 MMOL/L (ref 3.5–5.1)
RBC # BLD: 4.37 M/UL (ref 4.2–5.9)
SODIUM BLD-SCNC: 138 MMOL/L (ref 136–145)
TRIGL SERPL-MCNC: 127 MG/DL (ref 0–150)
VLDLC SERPL CALC-MCNC: 25 MG/DL
WBC # BLD: 5.3 K/UL (ref 4–11)

## 2020-03-10 PROCEDURE — 84450 TRANSFERASE (AST) (SGOT): CPT

## 2020-03-10 PROCEDURE — 36415 COLL VENOUS BLD VENIPUNCTURE: CPT

## 2020-03-10 PROCEDURE — 80061 LIPID PANEL: CPT

## 2020-03-10 PROCEDURE — 85025 COMPLETE CBC W/AUTO DIFF WBC: CPT

## 2020-03-10 PROCEDURE — 84460 ALANINE AMINO (ALT) (SGPT): CPT

## 2020-03-10 PROCEDURE — 80069 RENAL FUNCTION PANEL: CPT

## 2020-03-16 NOTE — TELEPHONE ENCOUNTER
Medication:   Requested Prescriptions     Pending Prescriptions Disp Refills    metFORMIN (GLUCOPHAGE) 1000 MG tablet [Pharmacy Med Name: [address] [place] [address],[address] 1000 MG Tablet] 180 tablet 1     Sig: TAKE 1 TABLET TWICE DAILY WITH MEALS       Last Filled:  10/16/2019 #180 1rf    Patient Phone Number: [phone] (home)     Last appt: 10/29/2019   Next appt: 4/29/2020    Last Labs DM:   Lab Results   Component Value Date    LABA1C 5.9 10/29/2019

## 2020-03-19 ENCOUNTER — TELEPHONE (OUTPATIENT)
Dept: CARDIOLOGY CLINIC | Age: 80
End: 2020-03-19

## 2020-03-19 NOTE — TELEPHONE ENCOUNTER
Spoke with pt relayed lab results per Dr. Lilli Johnson.  Pt verbalized understanding.    ----- Message from Heidi Landa MD sent at 3/19/2020  1:39 PM EDT -----  Let him know that labs are good

## 2020-03-25 RX ORDER — METOPROLOL SUCCINATE 25 MG/1
TABLET, EXTENDED RELEASE ORAL
Qty: 90 TABLET | Refills: 3 | Status: SHIPPED | OUTPATIENT
Start: 2020-03-25 | End: 2021-08-17

## 2020-03-25 RX ORDER — ATORVASTATIN CALCIUM 80 MG/1
TABLET, FILM COATED ORAL
Qty: 90 TABLET | Refills: 3 | Status: SHIPPED | OUTPATIENT
Start: 2020-03-25 | End: 2022-02-18

## 2020-04-15 RX ORDER — CALCIUM CITRATE/VITAMIN D3 200MG-6.25
TABLET ORAL
Qty: 100 STRIP | Refills: 5 | Status: SHIPPED | OUTPATIENT
Start: 2020-04-15 | End: 2021-08-18 | Stop reason: SDUPTHER

## 2020-05-26 RX ORDER — TAMSULOSIN HYDROCHLORIDE 0.4 MG/1
CAPSULE ORAL
Qty: 90 CAPSULE | Refills: 1 | Status: SHIPPED | OUTPATIENT
Start: 2020-05-26 | End: 2021-03-22 | Stop reason: SDUPTHER

## 2020-06-15 ENCOUNTER — OFFICE VISIT (OUTPATIENT)
Dept: FAMILY MEDICINE CLINIC | Age: 80
End: 2020-06-15
Payer: MEDICARE

## 2020-06-15 VITALS
BODY MASS INDEX: 32.55 KG/M2 | DIASTOLIC BLOOD PRESSURE: 78 MMHG | OXYGEN SATURATION: 98 % | SYSTOLIC BLOOD PRESSURE: 100 MMHG | HEART RATE: 64 BPM | TEMPERATURE: 97.9 F | WEIGHT: 240 LBS

## 2020-06-15 PROBLEM — Z96.642 COMPLICATION OF INTERNAL LEFT HIP PROSTHESIS (HCC): Status: RESOLVED | Noted: 2019-09-23 | Resolved: 2020-06-15

## 2020-06-15 PROBLEM — T84.9XXA COMPLICATION OF INTERNAL LEFT HIP PROSTHESIS (HCC): Status: RESOLVED | Noted: 2019-09-23 | Resolved: 2020-06-15

## 2020-06-15 LAB — HBA1C MFR BLD: 6.5 %

## 2020-06-15 PROCEDURE — 1123F ACP DISCUSS/DSCN MKR DOCD: CPT | Performed by: FAMILY MEDICINE

## 2020-06-15 PROCEDURE — G8427 DOCREV CUR MEDS BY ELIG CLIN: HCPCS | Performed by: FAMILY MEDICINE

## 2020-06-15 PROCEDURE — 1036F TOBACCO NON-USER: CPT | Performed by: FAMILY MEDICINE

## 2020-06-15 PROCEDURE — G8417 CALC BMI ABV UP PARAM F/U: HCPCS | Performed by: FAMILY MEDICINE

## 2020-06-15 PROCEDURE — 83036 HEMOGLOBIN GLYCOSYLATED A1C: CPT | Performed by: FAMILY MEDICINE

## 2020-06-15 PROCEDURE — 4040F PNEUMOC VAC/ADMIN/RCVD: CPT | Performed by: FAMILY MEDICINE

## 2020-06-15 PROCEDURE — 99214 OFFICE O/P EST MOD 30 MIN: CPT | Performed by: FAMILY MEDICINE

## 2020-06-15 ASSESSMENT — PATIENT HEALTH QUESTIONNAIRE - PHQ9
SUM OF ALL RESPONSES TO PHQ QUESTIONS 1-9: 0
SUM OF ALL RESPONSES TO PHQ9 QUESTIONS 1 & 2: 0
1. LITTLE INTEREST OR PLEASURE IN DOING THINGS: 0
SUM OF ALL RESPONSES TO PHQ QUESTIONS 1-9: 0
2. FEELING DOWN, DEPRESSED OR HOPELESS: 0

## 2020-06-15 NOTE — PROGRESS NOTES
[address] Note    Date: 6/15/2020                                               Subjective/Objective:     Chief Complaint   Patient presents with    6 Month Follow-Up     dm/bp        HPI   Patient is here for annual exam.  Last Tdap 2018. Has had pneumonia vaccines. Has history of diabetes. .  Currently takes metformin and Januvia. Denies polyuria or polydipsia. Denies vision change. A1c today is 6.5. Last eye exam a few months ago. Patient has hypertension. Currently takes metoprolol and HCTZ and lisinopril and amlodipine. Denies chest pain or shortness of breath. Is taking Lipitor for hyperlipidemia. Patient has history of heart failure. Sees cardiology. Denies any ankle swelling.            Patient Active Problem List    Diagnosis Date Noted    Essential hypertension, benign 07/12/2011     Priority: High    CAD (coronary artery disease) 07/12/2011     Priority: High    Status post AAA (abdominal aortic aneurysm) repair 11/10/2014     Priority: Medium    Chronic systolic heart failure (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 08/08/2014    Thoracic and lumbosacral neuritis 01/29/2014    Spinal stenosis of lumbar region 01/29/2014    Acquired spondylolisthesis 01/29/2014    Old MI (myocardial infarction) 07/12/2011       Past Medical History:   Diagnosis Date    Acquired spondylolisthesis 1/29/2014    Arthritis     diffuse    CAD (coronary artery disease)     S/P PCI and CABG    Carotid disease, bilateral ([address] 2/3/2014    Cholelithiasis 11/10/2014    Chronic systolic heart failure ([facility]     Diabetes mellitus type II     Essential hypertension     Hyperlipidemia     Obesity (BMI 30.0-34.9) 2/27/2015   

## 2020-06-20 RX ORDER — HYDROCHLOROTHIAZIDE 25 MG/1
TABLET ORAL
Qty: 90 TABLET | Refills: 1 | Status: SHIPPED | OUTPATIENT
Start: 2020-06-20 | End: 2020-07-31 | Stop reason: SDUPTHER

## 2020-06-25 NOTE — TELEPHONE ENCOUNTER
Medication and Quantity requested: Metformin Hydrochloride ER tab, no strength or quantity listed on fax     Last Visit  6-15-20,    Pharmacy and phone number updated in [facility]:  Yes,[facility]

## 2020-07-24 NOTE — TELEPHONE ENCOUNTER
Medication and Quantity requested: Metformin Hydrochloride ER tablets Ariadne Rodriguez    Last Visit  6/15/20    Pharmacy and phone number updated in [facility]:  Yes  [place]

## 2020-07-29 NOTE — TELEPHONE ENCOUNTER
Message from [address]: Pt is requesting Metformin Hydrochloride ED Tab. If appropriate please send new Rx with strength and directions. If Rx is not covered by insurance can medication be replaced with Metformin HCL 500MG/5mL Solution?

## 2020-07-29 NOTE — TELEPHONE ENCOUNTER
No need to take the combination pill. Pt should just take his current Metformin pills and his HCTZ pills.

## 2020-07-31 RX ORDER — HYDROCHLOROTHIAZIDE 25 MG/1
TABLET ORAL
Qty: 90 TABLET | Refills: 1 | Status: ON HOLD | OUTPATIENT
Start: 2020-07-31 | End: 2020-11-24 | Stop reason: HOSPADM

## 2020-07-31 NOTE — TELEPHONE ENCOUNTER
Medication and Quantity requested:     Metformin Hydrochloride ER Tablets  And  Metformin  mg/5 ml Solution (Riomet)     Last Visit  6/15/20    Pharmacy and phone number updated in EPIC:  Yes [facility]

## 2020-08-10 ENCOUNTER — TELEPHONE (OUTPATIENT)
Dept: FAMILY MEDICINE CLINIC | Age: 80
End: 2020-08-10

## 2020-08-10 RX ORDER — METFORMIN HYDROCHLORIDE 1000 MG/1
1000 TABLET, FILM COATED, EXTENDED RELEASE ORAL
Qty: 90 TABLET | Refills: 1 | Status: SHIPPED | OUTPATIENT
Start: 2020-08-10 | End: 2020-08-12 | Stop reason: SDUPTHER

## 2020-08-10 NOTE — TELEPHONE ENCOUNTER
Medication and Quantity requested:     Metformin Hydrochloride ER tablets (glumetza)  And  Metformin  mg/5ml Solution    Last Visit  6/15/20    Pharmacy and phone number updated in EPIC:  Yes  [facility]

## 2020-08-10 NOTE — TELEPHONE ENCOUNTER
I sent a prescription for Metformin ER to his [facility]. He won't be needing the liquid Metformin.

## 2020-08-11 NOTE — TELEPHONE ENCOUNTER
Metformin is the best medicine for diabetes. The most common side effect is GI upset, but this typically resolves after a few weeks. I recommend he try this, and if he does not tolerate it for some reason, that we can try something else.

## 2020-08-13 RX ORDER — METFORMIN HYDROCHLORIDE 1000 MG/1
1000 TABLET, FILM COATED, EXTENDED RELEASE ORAL
Qty: 90 TABLET | Refills: 1 | Status: SHIPPED | OUTPATIENT
Start: 2020-08-13 | End: 2020-08-17

## 2020-08-17 ENCOUNTER — TELEPHONE (OUTPATIENT)
Dept: FAMILY MEDICINE CLINIC | Age: 80
End: 2020-08-17

## 2020-08-17 NOTE — TELEPHONE ENCOUNTER
----- Message from Bhavya Alberto sent at 8/17/2020  4:29 PM EDT -----  Subject: Message to Provider    QUESTIONS  Information for Provider? pharmacy said the metphormin the doctor is 2   grand the extended is metphormin is cheaper and could the patient take two   500 a day  ---------------------------------------------------------------------------  --------------  CALL BACK INFO  What is the best way for the office to contact you? OK to leave message on   voicemail  Preferred Call Back Phone Number? [phone]  ---------------------------------------------------------------------------  --------------  SCRIPT ANSWERS  Relationship to Patient? Other  Representative Name? [address]  Is the Representative on the appropriate HIPAA document in Epic?  Yes

## 2020-08-17 NOTE — TELEPHONE ENCOUNTER
[facility]. asking still want to prescribe Courtney Rich- drug is very expensive or can we switch to Metformin  MG and have patient take 2 q.d?     Please contact [facility]-Fax [phone]

## 2020-08-17 NOTE — TELEPHONE ENCOUNTER
Pt does not want script to [facility], pt wants script sent to [facility] like the extended release was expensive, the regular metformin would be cheaper from pt.

## 2020-08-25 NOTE — TELEPHONE ENCOUNTER
I prescribed metformin immediate release to be taken twice a day. This form is very cheap, however does require that he takes it twice a day.

## 2020-09-10 NOTE — PROGRESS NOTES
[facility], [place], operated by [facility]   Cardiac Follow Up     Referring Provider:  Phil Ward MD     Chief Complaint   Patient presents with    Coronary Artery Disease    Hypertension    Numbness     in right arm and leg especially when laying down         History of Present Illness:  Yancy Mccauley is a 78 y.o. male with a history of CAD/ s/p CABG 02/ hypertension/ and hyperlipidemia AAA s/p repair '09 and carotid stenosis s/p LCE '06. Today, he reports he feels ok, other than some \"tingling\" in his arm when he bends it a certain way while lying down. 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 Denies MAGANA/PND, palpitations, light-headedness, edema. Past Medical History:   has a past medical history of Acquired spondylolisthesis, Arthritis, CAD (coronary artery disease), Carotid disease, bilateral ([address] Cholelithiasis, Chronic systolic heart failure ([address] Diabetes mellitus type II, Essential hypertension, Hyperlipidemia, Obesity (BMI 30.0-34.9), Prolonged emergence from general anesthesia, Spinal stenosis of lumbar region, and Status post AAA (abdominal aortic aneurysm) repair. Surgical History:   has a past surgical history that includes Umbilical hernia repair (1987); Hip Arthroplasty (Left, 1999); Carotid endarterectomy (Left, 2006); AAA repair, endovascular (2009); Coronary artery bypass graft (2002); Vasectomy (1975); cyst removal (Right, 1997); Thyroid surgery (2012); Retinal detachment surgery (Left, 2004); Coronary angioplasty with stent (2009); Rotator cuff repair (Left, 2/24/14); Inguinal hernia repair (Left, 1985); Elbow surgery (Left, 1984); Total knee arthroplasty (Right, 09/15/2015); other surgical history (03/06/2018); and Revision total hip arthroplasty (Left, 9/23/2019). Social History:   reports that he quit smoking about 49 years ago. His smoking use included cigarettes. He has a 45.00 pack-year smoking history.  He has never used smokeless tobacco. He reports that he does not drink alcohol or use drugs. Family History:  family history includes Breast Cancer in his sister; Cancer in his brother; Heart Disease in his brother, father, and sister. Home Medications:  Prior to Admission medications    Medication Sig Start Date End Date Taking?  Authorizing Provider   metFORMIN (GLUCOPHAGE) 1000 MG tablet Take 1 tablet by mouth 2 times daily (with meals) 8/17/20  Yes Boogie Hsieh MD   hydroCHLOROthiazide (HYDRODIURIL) 25 MG tablet TAKE 1 TABLET EVERY DAY 7/31/20  Yes Boogie Hsieh MD   tamsulosin (FLOMAX) 0.4 MG capsule TAKE 1 CAPSULE EVERY DAY 5/26/20  Yes Boogie Hsieh MD   TRUE METRIX BLOOD GLUCOSE TEST strip TEST DAILY AS NEEDED. 4/15/20  Yes Boogie Hsieh MD   metoprolol succinate (TOPROL XL) 25 MG extended release tablet TAKE 1 TABLET EVERY NIGHT 3/25/20  Yes Iza Dorantes MD   atorvastatin (LIPITOR) 80 MG tablet TAKE 1 TABLET EVERY DAY 3/25/20  Yes Iza Dorantes MD   nitroGLYCERIN (NITROSTAT) 0.4 MG SL tablet Place 1 tablet under the tongue every 5 minutes as needed for Chest pain 3/9/20  Yes Iza Dorantes MD   clopidogrel (PLAVIX) 75 MG tablet TAKE 1 TABLET EVERY DAY (NEED MD APPOINTMENT FOR MORE REFILLS) 11/21/19  Yes Iza Dorantes MD   DULoxetine (CYMBALTA) 30 MG extended release capsule  9/16/19  Yes Historical Provider, MD   gabapentin (NEURONTIN) 300 MG capsule  9/16/19  Yes Historical Provider, MD   lisinopril (PRINIVIL;ZESTRIL) 20 MG tablet TAKE 1 TABLET EVERY DAY  Patient taking differently: nightly  4/3/19  Yes Iza Dorantes MD   TRUEPLUS LANCETS 33G MISC CHECK BLOOD SUGAR 4 TIMES PER DAY AS NEEDED  Patient taking differently: CHECK BLOOD SUGAR 1 TIME PER DAY AS NEEDED 7/31/18  Yes Boogie Hsieh MD   amLODIPine (NORVASC) 10 MG tablet Take 1 tablet by mouth daily  Patient taking differently: Take 10 mg by mouth nightly  7/30/18  Yes Boogie Hsieh MD   SITagliptin (JANUVIA) 25 MG tablet Take 1 tablet by mouth daily 7/9/18  Yes Colton Strickland MD   Blood Glucose Monitoring Suppl (TRUE METRIX AIR GLUCOSE METER) NATASHA Check blood sugar as needed 5/24/18  Yes Colton Strickland MD   aspirin 81 MG tablet Take 81 mg by mouth daily   Yes Historical Provider, MD   ibuprofen (ADVIL) 200 MG CAPS Take 2 capsules by mouth 3 times daily   Yes Historical Provider, MD        Allergies:  Latex and Iodine     Review of Systems:   · Constitutional: there has been no unanticipated weight loss. There's been no change in energy level, sleep pattern, or activity level. · Eyes: No visual changes or diplopia. No scleral icterus. · ENT: No Headaches, hearing loss or vertigo. No mouth sores or sore throat. · Cardiovascular: Reviewed in HPI  · Respiratory: No cough or wheezing, no sputum production. No hematemesis. · Gastrointestinal: No abdominal pain, appetite loss, blood in stools. No change in bowel or bladder habits. · Genitourinary: No dysuria, trouble voiding, or hematuria. · Musculoskeletal:  No gait disturbance, weakness or joint complaints. · Integumentary: No rash or pruritis. · Neurological: No headache, diplopia, change in muscle strength, numbness or tingling. No change in gait, balance, coordination, mood, affect, memory, mentation, behavior. · Psychiatric: No anxiety, no depression. · Endocrine: No malaise, fatigue or temperature intolerance. No excessive thirst, fluid intake, or urination. No tremor. · Hematologic/Lymphatic: No abnormal bruising or bleeding, blood clots or swollen lymph nodes. · Allergic/Immunologic: No nasal congestion or hives.     Physical Examination:    Vitals:    09/14/20 0903   BP: 132/80   Pulse: 64        Wt Readings from Last 1 Encounters:   09/14/20 235 lb 12.8 oz (107 kg)       Constitutional and General Appearance: NAD  Skin:good turgor,intact without lesions  HEENT: EOMI ,normal  Neck:no JVD    Respiratory:  · Normal excursion and expansion without use of accessory muscles  · Resp Auscultation: Normal breath sounds without dullness  Cardiovascular:  · The apical impulses not displaced  · Heart tones are crisp and normal  · Cervical veins are not engorged  · The carotid upstroke is normal in amplitude and contour without delay or bruit  · Peripheral pulses are symmetrical and full  · There is no clubbing, cyanosis of the extremities. · No edema  · Femoral Arteries: 2+ and equal  · Pedal Pulses: 2+ and equal   Abdomen:  · No masses or tenderness  · Liver/Spleen: No Abnormalities Noted  Neurological/Psychiatric:  · Alert and oriented in all spheres  · Moves all extremities well  · Exhibits normal gait balance and coordination  · No abnormalities of mood, affect, memory, mentation, or behavior are noted      Assessment:    1. Coronary artery disease  ~s/p CABG '02     Patient denies any anginal symptoms     Plan: continue ASA, Plavix, Lisinopril, Toprol XL, and Lipitor      2. Hypertension      Blood pressure 132/80, pulse 64, height 6' (1.829 m), weight 235 lb 12.8 oz (107 kg). Stable      3. Hyperlipidemia      3/20: Chol Fasting 150,  HDL: 38, LDL: 87,         Continue Lipitor 80 mg daily         4. AAA--1/14/16  CT--mild aneurysmal dilation ascending aorta 4.8 cm similar to prior study. S/p aortic stent graft      Followed per PCP      5. Carotid disease-- stable - LCE '06   1/16   Right--16-49%  Left 1-15% --stable      Plan:    Emmett Chavez has a stable cardiac status. Lab results reviewed this visit and discussed. No med changes. Continue risk factor modifications. Call for any change in symptoms. Return for regular follow up in 6 months with labs prior. I appreciate the opportunity of cooperating in the care of this individual.    Jakub Zavala. Debbie Ro M.D., [facility]      Patient's problem list, medications, allergies, past medical, surgical, social and family histories were reviewed and updated as appropriate. Scribe's attestation:  This note was scribed in the presence of  Jorge Alberto Acosta MD, by Tiff Rodríguez RN. The scribe's documentation has been prepared under my direction and personally reviewed by me in its entirety. I confirm that the note above accurately reflects all work, treatment, procedures, and medical decision making performed by me.

## 2020-09-14 ENCOUNTER — OFFICE VISIT (OUTPATIENT)
Dept: CARDIOLOGY CLINIC | Age: 80
End: 2020-09-14
Payer: MEDICARE

## 2020-09-14 VITALS
SYSTOLIC BLOOD PRESSURE: 132 MMHG | BODY MASS INDEX: 31.94 KG/M2 | HEIGHT: 72 IN | WEIGHT: 235.8 LBS | HEART RATE: 64 BPM | DIASTOLIC BLOOD PRESSURE: 80 MMHG

## 2020-09-14 PROCEDURE — 99214 OFFICE O/P EST MOD 30 MIN: CPT | Performed by: INTERNAL MEDICINE

## 2020-10-30 ENCOUNTER — NURSE ONLY (OUTPATIENT)
Dept: FAMILY MEDICINE CLINIC | Age: 80
End: 2020-10-30
Payer: MEDICARE

## 2020-10-30 PROCEDURE — 90694 VACC AIIV4 NO PRSRV 0.5ML IM: CPT | Performed by: FAMILY MEDICINE

## 2020-10-30 PROCEDURE — G0008 ADMIN INFLUENZA VIRUS VAC: HCPCS | Performed by: FAMILY MEDICINE

## 2020-11-11 ENCOUNTER — TELEPHONE (OUTPATIENT)
Dept: CARDIOLOGY CLINIC | Age: 80
End: 2020-11-11

## 2020-11-11 ENCOUNTER — OFFICE VISIT (OUTPATIENT)
Dept: FAMILY MEDICINE CLINIC | Age: 80
End: 2020-11-11
Payer: MEDICARE

## 2020-11-11 VITALS
WEIGHT: 238 LBS | DIASTOLIC BLOOD PRESSURE: 80 MMHG | HEART RATE: 88 BPM | TEMPERATURE: 97 F | HEIGHT: 72 IN | OXYGEN SATURATION: 99 % | SYSTOLIC BLOOD PRESSURE: 138 MMHG | BODY MASS INDEX: 32.23 KG/M2

## 2020-11-11 DIAGNOSIS — R79.1 ABNORMAL COAGULATION PROFILE: ICD-10-CM

## 2020-11-11 DIAGNOSIS — Z01.818 PRE-OP EXAM: ICD-10-CM

## 2020-11-11 LAB
ANION GAP SERPL CALCULATED.3IONS-SCNC: 10 MMOL/L (ref 3–16)
BASOPHILS ABSOLUTE: 0 K/UL (ref 0–0.2)
BASOPHILS RELATIVE PERCENT: 0.9 %
BILIRUBIN URINE: NEGATIVE
BLOOD, URINE: NEGATIVE
BUN BLDV-MCNC: 20 MG/DL (ref 7–20)
CALCIUM SERPL-MCNC: 8.9 MG/DL (ref 8.3–10.6)
CHLORIDE BLD-SCNC: 102 MMOL/L (ref 99–110)
CLARITY: CLEAR
CO2: 25 MMOL/L (ref 21–32)
COLOR: YELLOW
CREAT SERPL-MCNC: 1.3 MG/DL (ref 0.8–1.3)
EOSINOPHILS ABSOLUTE: 0.2 K/UL (ref 0–0.6)
EOSINOPHILS RELATIVE PERCENT: 4.2 %
GFR AFRICAN AMERICAN: >60
GFR NON-AFRICAN AMERICAN: 53
GLUCOSE BLD-MCNC: 104 MG/DL (ref 70–99)
GLUCOSE URINE: NEGATIVE MG/DL
HCT VFR BLD CALC: 39.5 % (ref 40.5–52.5)
HEMOGLOBIN: 13.5 G/DL (ref 13.5–17.5)
INR BLD: 0.96 (ref 0.86–1.14)
KETONES, URINE: NEGATIVE MG/DL
LEUKOCYTE ESTERASE, URINE: NEGATIVE
LYMPHOCYTES ABSOLUTE: 1.2 K/UL (ref 1–5.1)
LYMPHOCYTES RELATIVE PERCENT: 24.2 %
MCH RBC QN AUTO: 32.9 PG (ref 26–34)
MCHC RBC AUTO-ENTMCNC: 34.2 G/DL (ref 31–36)
MCV RBC AUTO: 96.3 FL (ref 80–100)
MICROSCOPIC EXAMINATION: NORMAL
MONOCYTES ABSOLUTE: 0.3 K/UL (ref 0–1.3)
MONOCYTES RELATIVE PERCENT: 5.9 %
NEUTROPHILS ABSOLUTE: 3.1 K/UL (ref 1.7–7.7)
NEUTROPHILS RELATIVE PERCENT: 64.8 %
NITRITE, URINE: NEGATIVE
PDW BLD-RTO: 14 % (ref 12.4–15.4)
PH UA: 5.5 (ref 5–8)
PLATELET # BLD: 143 K/UL (ref 135–450)
PMV BLD AUTO: 10.4 FL (ref 5–10.5)
POTASSIUM SERPL-SCNC: 4.2 MMOL/L (ref 3.5–5.1)
PROTEIN UA: NEGATIVE MG/DL
PROTHROMBIN TIME: 11.1 SEC (ref 10–13.2)
RBC # BLD: 4.1 M/UL (ref 4.2–5.9)
SODIUM BLD-SCNC: 137 MMOL/L (ref 136–145)
SPECIFIC GRAVITY UA: 1.02 (ref 1–1.03)
URINE TYPE: NORMAL
UROBILINOGEN, URINE: 1 E.U./DL
WBC # BLD: 4.8 K/UL (ref 4–11)

## 2020-11-11 PROCEDURE — 93000 ELECTROCARDIOGRAM COMPLETE: CPT | Performed by: FAMILY MEDICINE

## 2020-11-11 PROCEDURE — 99213 OFFICE O/P EST LOW 20 MIN: CPT | Performed by: FAMILY MEDICINE

## 2020-11-11 NOTE — TELEPHONE ENCOUNTER
Received refill request for Lisinopril from [address] [place] [address].      Last OV: 09/14/2020 w/ LES    Last Refill: 04/03/019 #90 w/ 3 refills    Next Appointment: on recall list for appt on 03/13/2021 w/ LES

## 2020-11-11 NOTE — TELEPHONE ENCOUNTER
Pt calling to let LES know that he is having carpal  tunnel surgery on 11/20/20 and what the ok?  pls call to advise thank you

## 2020-11-12 RX ORDER — LISINOPRIL 20 MG/1
TABLET ORAL
Qty: 90 TABLET | Refills: 3 | Status: SHIPPED | OUTPATIENT
Start: 2020-11-12 | End: 2021-06-19

## 2020-11-13 ENCOUNTER — TELEPHONE (OUTPATIENT)
Dept: CARDIOLOGY CLINIC | Age: 80
End: 2020-11-13

## 2020-11-13 ENCOUNTER — TELEPHONE (OUTPATIENT)
Dept: FAMILY MEDICINE CLINIC | Age: 80
End: 2020-11-13

## 2020-11-13 NOTE — TELEPHONE ENCOUNTER
Lorena Daniels is asking for 11/11/20 EKG, pre-op and labs to be faxed over to [phone].   Pt has upcoming surgery

## 2020-11-13 NOTE — TELEPHONE ENCOUNTER
CARDIAC CLEARANCE     What type of procedure are you having? Rt carpel tunnel release     Which physician is performing your procedure? Dr Lesli Gaston     When is your procedure scheduled for? 11/20/20    Where are you having this procedure? [place]     Are you taking Blood Thinners? Plavix, 81mg ASA    If so what? (Name/dose/frequesncy)     Does the surgeon want you to stop your blood thinner? If so for how long?    Leaving this up to Asa Chapin    Phone Number and Contact Name for Physicians office:    Fax number to send information:  [phone]   Estefanía Byers

## 2020-11-13 NOTE — LETTER
[address] [place] [address] Cardiology [address]  [address]  Phone: [phone]  Fax: [phone]    Ary Walls MD        November 13, 2020     Patient: Rosa Isela Zuniga   YOB: 1940   Date of Visit: 11/13/2020       To Whom It May Concern: It is my medical opinion that Anette Bodily is stable for this procedure. There are no apparent cardiac contraindications to the proposed procedure using standard anesthetic technique. There are no apparent interventions to ameliorate the cardiac risk. This clinical assessment assumes a full Anesthesia evaluation for overall risk of airway management, type and route of anesthetic, and other relevant anesthesia-specific considerations. If you have any questions or concerns, please don't hesitate to call.     Sincerely,        Ary Walls MD

## 2020-11-16 ENCOUNTER — TELEPHONE (OUTPATIENT)
Dept: CARDIOLOGY CLINIC | Age: 80
End: 2020-11-16

## 2020-11-17 ENCOUNTER — TELEPHONE (OUTPATIENT)
Dept: CARDIOLOGY CLINIC | Age: 80
End: 2020-11-17

## 2020-11-17 NOTE — TELEPHONE ENCOUNTER
Called pt about the message below and left message on machine with the message. He was told to call back if he had any questions.

## 2020-11-17 NOTE — TELEPHONE ENCOUNTER
Who is requesting records: Emre Campuzano    What is needed:  Last OV Notes    Phone number of the doctor/facility where records are to be sent: [phone] Option 7    Fax number of the doctor/facility where records are to be sent[de-identified] [phone]

## 2020-11-17 NOTE — TELEPHONE ENCOUNTER
Chisago Smith calling back needs to know when pt should stop taking his plavix and asprin prior to hi surgery on Friday?  pls call to advise thank you

## 2020-11-18 ENCOUNTER — TELEPHONE (OUTPATIENT)
Dept: CARDIOLOGY CLINIC | Age: 80
End: 2020-11-18

## 2020-11-18 NOTE — TELEPHONE ENCOUNTER
[facility] stating they still have not received this patients last office note . They think maybe their fax isnt working.  Could you send last office note to a different fax number of [phone]

## 2020-11-22 ENCOUNTER — APPOINTMENT (OUTPATIENT)
Dept: CT IMAGING | Age: 80
End: 2020-11-22
Payer: MEDICARE

## 2020-11-22 ENCOUNTER — HOSPITAL ENCOUNTER (OUTPATIENT)
Age: 80
Setting detail: OBSERVATION
Discharge: HOME | End: 2020-11-24
Attending: EMERGENCY MEDICINE | Admitting: INTERNAL MEDICINE
Payer: MEDICARE

## 2020-11-22 ENCOUNTER — APPOINTMENT (OUTPATIENT)
Dept: GENERAL RADIOLOGY | Age: 80
End: 2020-11-22
Payer: MEDICARE

## 2020-11-22 PROBLEM — R55 SYNCOPE AND COLLAPSE: Status: ACTIVE | Noted: 2020-11-22

## 2020-11-22 LAB
A/G RATIO: 1.4 (ref 1.1–2.2)
ALBUMIN SERPL-MCNC: 4.2 G/DL (ref 3.4–5)
ALP BLD-CCNC: 104 U/L (ref 40–129)
ALT SERPL-CCNC: 13 U/L (ref 10–40)
ANION GAP SERPL CALCULATED.3IONS-SCNC: 9 MMOL/L (ref 3–16)
AST SERPL-CCNC: 15 U/L (ref 15–37)
BASOPHILS ABSOLUTE: 0 K/UL (ref 0–0.2)
BASOPHILS RELATIVE PERCENT: 0.4 %
BILIRUB SERPL-MCNC: 0.6 MG/DL (ref 0–1)
BILIRUBIN URINE: NEGATIVE
BLOOD, URINE: NEGATIVE
BUN BLDV-MCNC: 17 MG/DL (ref 7–20)
CALCIUM SERPL-MCNC: 9.4 MG/DL (ref 8.3–10.6)
CHLORIDE BLD-SCNC: 103 MMOL/L (ref 99–110)
CLARITY: CLEAR
CO2: 27 MMOL/L (ref 21–32)
COLOR: YELLOW
CREAT SERPL-MCNC: 1 MG/DL (ref 0.8–1.3)
EOSINOPHILS ABSOLUTE: 0.1 K/UL (ref 0–0.6)
EOSINOPHILS RELATIVE PERCENT: 0.6 %
GFR AFRICAN AMERICAN: >60
GFR NON-AFRICAN AMERICAN: >60
GLOBULIN: 3.1 G/DL
GLUCOSE BLD-MCNC: 100 MG/DL (ref 70–99)
GLUCOSE BLD-MCNC: 118 MG/DL (ref 70–99)
GLUCOSE BLD-MCNC: 120 MG/DL (ref 70–99)
GLUCOSE BLD-MCNC: 137 MG/DL (ref 70–99)
GLUCOSE URINE: NEGATIVE MG/DL
HCT VFR BLD CALC: 39.5 % (ref 40.5–52.5)
HEMOGLOBIN: 13.6 G/DL (ref 13.5–17.5)
KETONES, URINE: NEGATIVE MG/DL
LEUKOCYTE ESTERASE, URINE: NEGATIVE
LYMPHOCYTES ABSOLUTE: 0.8 K/UL (ref 1–5.1)
LYMPHOCYTES RELATIVE PERCENT: 10.1 %
MCH RBC QN AUTO: 33 PG (ref 26–34)
MCHC RBC AUTO-ENTMCNC: 34.4 G/DL (ref 31–36)
MCV RBC AUTO: 95.9 FL (ref 80–100)
MICROSCOPIC EXAMINATION: NORMAL
MONOCYTES ABSOLUTE: 0.4 K/UL (ref 0–1.3)
MONOCYTES RELATIVE PERCENT: 5.5 %
NEUTROPHILS ABSOLUTE: 6.7 K/UL (ref 1.7–7.7)
NEUTROPHILS RELATIVE PERCENT: 83.4 %
NITRITE, URINE: NEGATIVE
PDW BLD-RTO: 14 % (ref 12.4–15.4)
PERFORMED ON: ABNORMAL
PH UA: 5 (ref 5–8)
PLATELET # BLD: 134 K/UL (ref 135–450)
PMV BLD AUTO: 9.2 FL (ref 5–10.5)
POTASSIUM REFLEX MAGNESIUM: 4.4 MMOL/L (ref 3.5–5.1)
PRO-BNP: 573 PG/ML (ref 0–449)
PROTEIN UA: NEGATIVE MG/DL
RBC # BLD: 4.12 M/UL (ref 4.2–5.9)
SODIUM BLD-SCNC: 139 MMOL/L (ref 136–145)
SPECIFIC GRAVITY UA: 1.02 (ref 1–1.03)
TOTAL PROTEIN: 7.3 G/DL (ref 6.4–8.2)
TROPONIN: <0.01 NG/ML
TROPONIN: <0.01 NG/ML
URINE REFLEX TO CULTURE: NORMAL
URINE TYPE: NORMAL
UROBILINOGEN, URINE: 0.2 E.U./DL
WBC # BLD: 8.1 K/UL (ref 4–11)

## 2020-11-22 PROCEDURE — 72125 CT NECK SPINE W/O DYE: CPT

## 2020-11-22 PROCEDURE — 83880 ASSAY OF NATRIURETIC PEPTIDE: CPT

## 2020-11-22 PROCEDURE — G0378 HOSPITAL OBSERVATION PER HR: HCPCS

## 2020-11-22 PROCEDURE — 84484 ASSAY OF TROPONIN QUANT: CPT

## 2020-11-22 PROCEDURE — 85025 COMPLETE CBC W/AUTO DIFF WBC: CPT

## 2020-11-22 PROCEDURE — 93005 ELECTROCARDIOGRAM TRACING: CPT | Performed by: EMERGENCY MEDICINE

## 2020-11-22 PROCEDURE — 71045 X-RAY EXAM CHEST 1 VIEW: CPT

## 2020-11-22 PROCEDURE — 70450 CT HEAD/BRAIN W/O DYE: CPT

## 2020-11-22 PROCEDURE — 80053 COMPREHEN METABOLIC PANEL: CPT

## 2020-11-22 PROCEDURE — 2580000003 HC RX 258: Performed by: INTERNAL MEDICINE

## 2020-11-22 PROCEDURE — 99283 EMERGENCY DEPT VISIT LOW MDM: CPT

## 2020-11-22 PROCEDURE — 81003 URINALYSIS AUTO W/O SCOPE: CPT

## 2020-11-22 RX ORDER — AMLODIPINE BESYLATE 5 MG/1
10 TABLET ORAL NIGHTLY
Status: DISCONTINUED | OUTPATIENT
Start: 2020-11-22 | End: 2020-11-24 | Stop reason: HOSPADM

## 2020-11-22 RX ORDER — DEXTROSE MONOHYDRATE 25 G/50ML
12.5 INJECTION, SOLUTION INTRAVENOUS PRN
Status: DISCONTINUED | OUTPATIENT
Start: 2020-11-22 | End: 2020-11-24 | Stop reason: HOSPADM

## 2020-11-22 RX ORDER — INSULIN LISPRO 100 [IU]/ML
0-3 INJECTION, SOLUTION INTRAVENOUS; SUBCUTANEOUS NIGHTLY
Status: DISCONTINUED | OUTPATIENT
Start: 2020-11-22 | End: 2020-11-24 | Stop reason: HOSPADM

## 2020-11-22 RX ORDER — ACETAMINOPHEN 325 MG/1
650 TABLET ORAL EVERY 6 HOURS PRN
Status: DISCONTINUED | OUTPATIENT
Start: 2020-11-22 | End: 2020-11-24 | Stop reason: HOSPADM

## 2020-11-22 RX ORDER — POLYETHYLENE GLYCOL 3350 17 G/17G
17 POWDER, FOR SOLUTION ORAL DAILY PRN
Status: DISCONTINUED | OUTPATIENT
Start: 2020-11-22 | End: 2020-11-24 | Stop reason: HOSPADM

## 2020-11-22 RX ORDER — DULOXETIN HYDROCHLORIDE 30 MG/1
30 CAPSULE, DELAYED RELEASE ORAL DAILY
Status: DISCONTINUED | OUTPATIENT
Start: 2020-11-23 | End: 2020-11-24 | Stop reason: HOSPADM

## 2020-11-22 RX ORDER — TRAMADOL HYDROCHLORIDE 50 MG/1
50 TABLET ORAL EVERY 6 HOURS PRN
Status: ON HOLD | COMMUNITY
End: 2020-11-24 | Stop reason: HOSPADM

## 2020-11-22 RX ORDER — SODIUM CHLORIDE 0.9 % (FLUSH) 0.9 %
10 SYRINGE (ML) INJECTION EVERY 12 HOURS SCHEDULED
Status: DISCONTINUED | OUTPATIENT
Start: 2020-11-22 | End: 2020-11-24 | Stop reason: HOSPADM

## 2020-11-22 RX ORDER — SODIUM CHLORIDE 0.9 % (FLUSH) 0.9 %
10 SYRINGE (ML) INJECTION PRN
Status: DISCONTINUED | OUTPATIENT
Start: 2020-11-22 | End: 2020-11-24 | Stop reason: HOSPADM

## 2020-11-22 RX ORDER — INSULIN LISPRO 100 [IU]/ML
0-6 INJECTION, SOLUTION INTRAVENOUS; SUBCUTANEOUS
Status: DISCONTINUED | OUTPATIENT
Start: 2020-11-22 | End: 2020-11-24 | Stop reason: HOSPADM

## 2020-11-22 RX ORDER — ATORVASTATIN CALCIUM 80 MG/1
80 TABLET, FILM COATED ORAL DAILY
Status: DISCONTINUED | OUTPATIENT
Start: 2020-11-23 | End: 2020-11-24 | Stop reason: HOSPADM

## 2020-11-22 RX ORDER — METOPROLOL SUCCINATE 50 MG/1
25 TABLET, EXTENDED RELEASE ORAL NIGHTLY
Status: DISCONTINUED | OUTPATIENT
Start: 2020-11-22 | End: 2020-11-24 | Stop reason: HOSPADM

## 2020-11-22 RX ORDER — TAMSULOSIN HYDROCHLORIDE 0.4 MG/1
0.4 CAPSULE ORAL DAILY
Status: DISCONTINUED | OUTPATIENT
Start: 2020-11-23 | End: 2020-11-24 | Stop reason: HOSPADM

## 2020-11-22 RX ORDER — ONDANSETRON 2 MG/ML
4 INJECTION INTRAMUSCULAR; INTRAVENOUS EVERY 6 HOURS PRN
Status: DISCONTINUED | OUTPATIENT
Start: 2020-11-22 | End: 2020-11-24 | Stop reason: HOSPADM

## 2020-11-22 RX ORDER — TRAMADOL HYDROCHLORIDE 50 MG/1
50 TABLET ORAL EVERY 6 HOURS PRN
Status: DISCONTINUED | OUTPATIENT
Start: 2020-11-22 | End: 2020-11-23

## 2020-11-22 RX ORDER — DEXTROSE MONOHYDRATE 50 MG/ML
100 INJECTION, SOLUTION INTRAVENOUS PRN
Status: DISCONTINUED | OUTPATIENT
Start: 2020-11-22 | End: 2020-11-24 | Stop reason: HOSPADM

## 2020-11-22 RX ORDER — ACETAMINOPHEN 650 MG/1
650 SUPPOSITORY RECTAL EVERY 6 HOURS PRN
Status: DISCONTINUED | OUTPATIENT
Start: 2020-11-22 | End: 2020-11-24 | Stop reason: HOSPADM

## 2020-11-22 RX ORDER — CLOPIDOGREL BISULFATE 75 MG/1
75 TABLET ORAL DAILY
Status: DISCONTINUED | OUTPATIENT
Start: 2020-11-23 | End: 2020-11-24 | Stop reason: HOSPADM

## 2020-11-22 RX ORDER — ASPIRIN 81 MG/1
81 TABLET, CHEWABLE ORAL DAILY
Status: DISCONTINUED | OUTPATIENT
Start: 2020-11-23 | End: 2020-11-24 | Stop reason: HOSPADM

## 2020-11-22 RX ORDER — NICOTINE POLACRILEX 4 MG
15 LOZENGE BUCCAL PRN
Status: DISCONTINUED | OUTPATIENT
Start: 2020-11-22 | End: 2020-11-24 | Stop reason: HOSPADM

## 2020-11-22 RX ORDER — LISINOPRIL 20 MG/1
20 TABLET ORAL DAILY
Status: DISCONTINUED | OUTPATIENT
Start: 2020-11-23 | End: 2020-11-24 | Stop reason: HOSPADM

## 2020-11-22 RX ORDER — PROMETHAZINE HYDROCHLORIDE 25 MG/1
12.5 TABLET ORAL EVERY 6 HOURS PRN
Status: DISCONTINUED | OUTPATIENT
Start: 2020-11-22 | End: 2020-11-24 | Stop reason: HOSPADM

## 2020-11-22 RX ADMIN — Medication 10 ML: at 21:31

## 2020-11-22 ASSESSMENT — ENCOUNTER SYMPTOMS
DIARRHEA: 0
CHEST TIGHTNESS: 0
CONSTIPATION: 0
COUGH: 0
COLOR CHANGE: 0
PHOTOPHOBIA: 0
SHORTNESS OF BREATH: 0
NAUSEA: 0
VOMITING: 0
BACK PAIN: 0
RESPIRATORY NEGATIVE: 1
ABDOMINAL PAIN: 0

## 2020-11-22 NOTE — ED PROVIDER NOTES
I independently performed a history and physical on [employer]. All diagnostic, treatment, and disposition decisions were made by myself in conjunction with the advanced practice provider. Briefly, this is a 78 y.o. male here for syncopal episode. Was in his normal state of health. No preceding chest pain or shortness of breath. Had just make coffee and went to sit down and last remembers waking up on the ground. Unclear how long he had lost consciousness for. Lives alone. Has no symptoms at this time. This is never happened before. Did take 2 Ultram today for carpal tunnel surgery. On exam,   General: Patient is in no acute distress  Skin: No cyanosis  HEENT: Moist mucous membranes  Heart: Regular rate, regular rhythm  Lung: No respiratory distress  Abdomen: Soft, nontender  Neuro: Moving all extremities, no facial droop, no slurred speech, answers questions appropriately        EKG  The Ekg interpreted by me in the absence of a cardiologist shows. Normal sinus rhythm  RBBB  No acute ST changes or T wave abnormalities     Screenings            MDM  Patient is an 78-year-old man with history of CHF and recent carpal tunnel surgery who presents with syncopal episode for unclear period of time. Story inconsistent with vasovagal syncope or orthostatic hypotension. No headache, nausea, vomiting to indicate intracranial hemorrhage. No chest pain, shortness of breath, hypoxia, tachycardia, tachypnea or unilateral DVT symptoms to indicate pulmonary embolism. given prior medical history, by [facility] syncope rules he is not low risk. Since the patient lives alone, I do feel that a period of observation in the hospital would be prudent. EKG shows no ischemic changes at this time. Patient Referrals:  No follow-up provider specified. Discharge Medications:  New Prescriptions    No medications on file       FINAL IMPRESSION  1.  Syncope and collapse        Blood pressure (!) 142/91, pulse 70, temperature 96.6 °F (35.9 °C), resp. rate 14, height 6' (1.829 m), weight 225 lb (102.1 kg), SpO2 96 %.      For further details of [facility] emergency department encounter, please see documentation by advanced practice provider, .        Ariane Gaona MD  11/22/20 0257

## 2020-11-22 NOTE — ED PROVIDER NOTES
[facility]        Pt Name: Aisha Dawn  MRN: [id number]  [place] 1940  Date of evaluation: 11/22/2020  Provider: MARITZA Napier  PCP: Hardy Sarabia MD     I have seen and evaluated this patient with my supervising physician Sarah Barnhart MD.    [facility]       Chief Complaint   Patient presents with    Fall     PT REPORTS THAT HE WOKE UP THIS MORNING AND WENT DOWN TO MAKE COFFEE. STATES HE PASSED OUT. + HIT HIS HEAD. ON A BLOOD THINNER,       HISTORY OF PRESENT ILLNESS   (Location, Timing/Onset, Context/Setting, Quality, Duration, Modifying Factors, Severity, Associated Signs and Symptoms)  Note limiting factors. Aisha Dawn is a 78 y.o. male with past medical strip CAD, CHF, diabetes, hypertension, obesity and spinal stenosis who presents to the ED with complaint of a syncopal episode. Patient states this past week he had carpal tunnel surgery to his right hand. Patient states he did not have general anesthesia and states he only had a nerve block. Patient states he does have splint in place at this time. Patient states he woke up this morning he went downstairs to make coffee. States he actually made coughing was going to sit down in his chair when apparently he had a syncopal episode. Patient states he woke up on the ground in the chair was broken and he had a contusion to the back of his head. Denies any laceration or bleeding. Patient states he is on blood thinners with Plavix and aspirin. Patient states he does take ibuprofen daily. Was placed on pain medication for his recent carpal tunnel surgery with Ultram.  Patient states he has been taking. States last dose last night. Patient states he has not taken any pain medication this morning. Patient states he does have a slight headache at this time to his posterior head where he hit it.   Denies diffuse headache, visual changes, speech ([facility]     Diabetes mellitus type II     Essential hypertension     Hyperlipidemia     Obesity (BMI 30.0-34.9) 2/27/2015    Prolonged emergence from general anesthesia     slow to wake up    Spinal stenosis of lumbar region 1/29/2014    Status post AAA (abdominal aortic aneurysm) repair 11/10/2014         SURGICAL HISTORY     Past Surgical History:   Procedure Laterality Date    ABDOMINAL AORTIC ANEURYSM REPAIR, ENDOVASCULAR  2009 Zayyat; stent    CAROTID ENDARTERECTOMY Left 2006 Zayyat    CORONARY ANGIOPLASTY WITH STENT PLACEMENT  2009    CORONARY ARTERY BYPASS GRAFT  2002    [facility], x4    CYST REMOVAL Right 1997    wrist    ELBOW SURGERY Left 1984    laceration to tendons    HIP ARTHROPLASTY Left 1999    [facility]    INGUINAL HERNIA REPAIR Left 1985    OTHER SURGICAL HISTORY  03/06/2018    excision of sebacious cyst on back    RETINAL DETACHMENT SURGERY Left 2004    [facility]    REVISION TOTAL HIP ARTHROPLASTY Left 9/23/2019    LEFT TOTAL HIP ARTHROPLASTY REVISION POSTERIOR APPROACH performed by Bishop Sebastian MD at [address] [place] [address] Left 2/24/14    [facility] THYROID SURGERY  2012    biopsied at [address] Right 13/62/7114   [address]       Previous Medications    AMLODIPINE (NORVASC) 10 MG TABLET    Take 1 tablet by mouth daily    ASPIRIN 81 MG TABLET    Take 81 mg by mouth daily    ATORVASTATIN (LIPITOR) 80 MG TABLET    TAKE 1 TABLET EVERY DAY    BLOOD GLUCOSE MONITORING SUPPL (TRUE METRIX AIR GLUCOSE METER) NATASHA    Check blood sugar as needed    CLOPIDOGREL (PLAVIX) 75 MG TABLET    TAKE 1 TABLET EVERY DAY (NEED MD APPOINTMENT FOR MORE REFILLS)    DULOXETINE (CYMBALTA) 30 MG EXTENDED RELEASE CAPSULE        GABAPENTIN (NEURONTIN) 300 MG CAPSULE        HYDROCHLOROTHIAZIDE (HYDRODIURIL) 25 MG TABLET    TAKE 1 TABLET EVERY DAY    IBUPROFEN (ADVIL) 200 MG CAPS    Take 2 capsules by mouth 3 times daily LISINOPRIL (PRINIVIL;ZESTRIL) 20 MG TABLET    TAKE 1 TABLET BY MOUTH EVERY DAY    METFORMIN (GLUCOPHAGE) 1000 MG TABLET    Take 1 tablet by mouth 2 times daily (with meals)    METOPROLOL SUCCINATE (TOPROL XL) 25 MG EXTENDED RELEASE TABLET    TAKE 1 TABLET EVERY NIGHT    NITROGLYCERIN (NITROSTAT) 0.4 MG SL TABLET    Place 1 tablet under the tongue every 5 minutes as needed for Chest pain    SITAGLIPTIN (JANUVIA) 25 MG TABLET    Take 1 tablet by mouth daily    TAMSULOSIN (FLOMAX) 0.4 MG CAPSULE    TAKE 1 CAPSULE EVERY DAY    TRAMADOL (ULTRAM) 50 MG TABLET    Take 50 mg by mouth every 6 hours as needed for Pain. TRUE METRIX BLOOD GLUCOSE TEST STRIP    TEST DAILY AS NEEDED. TRUEPLUS LANCETS 33G MISC    CHECK BLOOD SUGAR 4 TIMES PER DAY AS NEEDED         ALLERGIES     Latex and Iodine    FAMILYHISTORY       Family History   Problem Relation Age of Onset    Heart Disease Father     Heart Disease Sister     Heart Disease Brother     Cancer Brother         testicular    Breast Cancer Sister           SOCIAL HISTORY       Social History     Tobacco Use    Smoking status: Former Smoker     Packs/day: 3.00     Years: 15.00     Pack years: 45.00     Types: Cigarettes     Last attempt to quit: 1971     Years since quittin.3    Smokeless tobacco: Never Used    Tobacco comment: quit 40 years ago   Substance Use Topics    Alcohol use: No    Drug use: No       SCREENINGS             PHYSICAL EXAM    (up to 7 for level 4, 8 or more for level 5)     ED Triage Vitals [20 0908]   BP Temp Temp src Pulse Resp SpO2 Height Weight   (!) 142/91 96.6 °F (35.9 °C) -- 70 14 96 % 6' (1.829 m) 225 lb (102.1 kg)       Physical Exam  Constitutional:       General: He is not in acute distress. Appearance: Normal appearance. He is well-developed. He is not ill-appearing, toxic-appearing or diaphoretic. HENT:      Head: Normocephalic.       Comments: Has what appears to be contusion noted to the posterior occipital head. No crepitus or step-off. No abrasion or laceration. No raccoon eyes or boo sign. No epistaxis. No septal hematoma. No trismus or jaw malocclusion. Right Ear: External ear normal.      Left Ear: External ear normal.      Mouth/Throat:      Mouth: Mucous membranes are moist.      Pharynx: No oropharyngeal exudate or posterior oropharyngeal erythema. Eyes:      General:         Right eye: No discharge. Left eye: No discharge. Extraocular Movements: Extraocular movements intact. Conjunctiva/sclera: Conjunctivae normal.      Pupils: Pupils are equal, round, and reactive to light. Neck:      Musculoskeletal: Normal range of motion and neck supple. No neck rigidity or muscular tenderness. Cardiovascular:      Rate and Rhythm: Normal rate and regular rhythm. Pulses: Normal pulses. Heart sounds: Normal heart sounds. No murmur. No friction rub. No gallop. Comments: 2+ radial pulses bilaterally. No pedal edema. No calf tenderness. No JVD. Pulmonary:      Effort: Pulmonary effort is normal. No respiratory distress. Breath sounds: Normal breath sounds. No stridor. No wheezing, rhonchi or rales. Chest:      Chest wall: No tenderness. Abdominal:      General: Abdomen is flat. Bowel sounds are normal. There is no distension. Palpations: Abdomen is soft. There is no mass. Tenderness: There is no abdominal tenderness. There is no right CVA tenderness, left CVA tenderness, guarding or rebound. Hernia: No hernia is present. Musculoskeletal: Normal range of motion. Comments: No TTP to the midline cervical, thoracic or lumbar spine. No anterior chest wall tenderness. No pelvis instability. No TTP to the upper and lower extremities throughout. Full range of motion strength throughout. Distal neurovascular intact with limits secondary to splint. Gait deferred.   Does have splint/dressing in place to the right upper extremity over the wrist and hand. Lymphadenopathy:      Cervical: No cervical adenopathy. Skin:     General: Skin is warm and dry. Coloration: Skin is not pale. Findings: No erythema or rash. Neurological:      General: No focal deficit present. Mental Status: He is alert and oriented to person, place, and time. GCS: GCS eye subscore is 4. GCS verbal subscore is 5. GCS motor subscore is 6. Cranial Nerves: Cranial nerves are intact. No cranial nerve deficit. Sensory: No sensory deficit. Motor: Motor function is intact. Comments: Gait deferred.    Psychiatric:         Behavior: Behavior normal.         DIAGNOSTIC RESULTS   LABS:    Labs Reviewed   CBC WITH AUTO DIFFERENTIAL - Abnormal; Notable for the following components:       Result Value    RBC 4.12 (*)     Hematocrit 39.5 (*)     Platelets 948 (*)     Lymphocytes Absolute 0.8 (*)     All other components within normal limits    Narrative:     Performed at:  [facility] [address], [place], [employer]   Phone [phone]   COMPREHENSIVE METABOLIC PANEL W/ REFLEX TO MG FOR LOW K - Abnormal; Notable for the following components:    Glucose 120 (*)     All other components within normal limits    Narrative:     Performed at:  [facility] [address], [place], [facility] [email or web address]   Phone [phone]   BRAIN NATRIURETIC PEPTIDE - Abnormal; Notable for the following components:    Pro- (*)     All other components within normal limits    Narrative:     Performed at:  [facility] [address], [place], [employer]   Phone [phone]   POCT GLUCOSE - Abnormal; Notable for the following components:    POC Glucose 137 (*)     All other components within normal limits    Narrative:     Performed at:  [facility] [address], [place], [employer]   Phone [phone]   POCT GLUCOSE - PM      PATIENT REFERREDTO:  No follow-up provider specified.     DISCHARGE MEDICATIONS:  New Prescriptions    No medications on file       DISCONTINUED MEDICATIONS:  Discontinued Medications    No medications on file              (Please note that portions of this note were completed with a voice recognition program.  Efforts were made to edit the dictations but occasionally words are mis-transcribed.)    MARITZA Tadeo (electronically signed)          MARITZA Casarez  11/22/20 5678

## 2020-11-22 NOTE — ED PROVIDER NOTES
The Ekg interpreted by me in the absence of a cardiologist shows. normal sinus rhythm with a rate of 65  Axis is   Normal  QTc is  normal  +RBBB unchanged     No specific ST-T wave changes appreciated. No evidence of acute ischemia. No significant change from prior EKG dated 9/18/19    I only performed EKG interpretation and was not otherwise involved in the care of this patient.          Sav Cline MD  11/22/20 9681

## 2020-11-22 NOTE — ED NOTES
Bed: 20  Expected date:   Expected time:   Means of arrival: Walk In  Comments:     Renee Kerr RN  11/22/20 0985

## 2020-11-22 NOTE — H&P
Hospital Medicine History & Physical      PCP: Toi Silverio MD    Date of Admission: 11/22/2020    Date of Service: Pt seen/examined on 11/22/2020  9:30 AM and   Placed in observation      Chief Complaint: Syncope    History Of Present Illness:    78 y.o. male with PMHx significant for pretension, hyperlipidemia, coronary artery bypass graft, type 2 diabetes mellitus and recent right carpal tunnel surgery on Friday admitted to the hospital after a syncopal episode  Patient had carpal tunnel surgery on Friday  Took some pain medications last evening at 5 and then last night at 11  This morning when he woke up he was feeling well went down to the kitchen and then sat down the next thing he remembers waking up on the ground  Prior to the episode patient feels that his vision was going in and out but he did not quite describe it as lightheadedness  He was probably down on the ground for 10 minutes after he came around he knew exactly where he was took some time got himself up and called his wife  Pain medications he took Ultram  No chest pain  No shortness of breath  No fevers  No lightheadedness  No orthostatic complaints  No speech or visual difficulty after that    Past Medical History:          Diagnosis Date    Acquired spondylolisthesis 1/29/2014    Arthritis     diffuse    CAD (coronary artery disease)     S/P PCI and CABG    Carotid disease, bilateral ([address] 2/3/2014    Cholelithiasis 11/10/2014    Chronic systolic heart failure ([facility]     Diabetes mellitus type II     Essential hypertension     Hyperlipidemia     Obesity (BMI 30.0-34.9) 2/27/2015    Prolonged emergence from general anesthesia     slow to wake up    Spinal stenosis of lumbar region 1/29/2014    Status post AAA (abdominal aortic aneurysm) repair 11/10/2014       Past Surgical History:          Procedure Laterality Date    ABDOMINAL AORTIC ANEURYSM REPAIR, ENDOVASCULAR  2009    Priscila; stent    CAROTID ENDARTERECTOMY Left 2006    Priscila    CORONARY ANGIOPLASTY WITH STENT PLACEMENT  2009    CORONARY ARTERY BYPASS GRAFT  2002    [facility], x4    CYST REMOVAL Right 1997    wrist    ELBOW SURGERY Left 1984    laceration to tendons    HIP ARTHROPLASTY Left 1999    [facility]    INGUINAL HERNIA REPAIR Left 1985    OTHER SURGICAL HISTORY  03/06/2018    excision of sebacious cyst on back    RETINAL DETACHMENT SURGERY Left 2004    [facility]    REVISION TOTAL HIP ARTHROPLASTY Left 9/23/2019    LEFT TOTAL HIP ARTHROPLASTY REVISION POSTERIOR APPROACH performed by Yesenia Jorge MD at [facility] Left 2/24/14    [facility] THYROID SURGERY  2012    biopsied at [facility] Right 75/91/0172   [facility]    VASECTOMY  1975       Medications Prior to Admission:      Prior to Admission medications    Medication Sig Start Date End Date Taking? Authorizing Provider   traMADol (ULTRAM) 50 MG tablet Take 50 mg by mouth every 6 hours as needed for Pain.    Yes Historical Provider, MD   lisinopril (PRINIVIL;ZESTRIL) 20 MG tablet TAKE 1 TABLET BY MOUTH EVERY DAY 11/12/20  Yes Sami Alberto MD   metFORMIN (GLUCOPHAGE) 1000 MG tablet Take 1 tablet by mouth 2 times daily (with meals) 8/17/20  Yes Ramiro Lucas MD   hydroCHLOROthiazide (HYDRODIURIL) 25 MG tablet TAKE 1 TABLET EVERY DAY 7/31/20  Yes Ramiro Lucas MD   tamsulosin (FLOMAX) 0.4 MG capsule TAKE 1 CAPSULE EVERY DAY 5/26/20  Yes Ramiro Lucas MD   TRUE METRIX BLOOD GLUCOSE TEST strip TEST DAILY AS NEEDED. 4/15/20  Yes Ramiro Lucas MD   metoprolol succinate (TOPROL XL) 25 MG extended release tablet TAKE 1 TABLET EVERY NIGHT 3/25/20  Yes Sami Alberto MD   nitroGLYCERIN (NITROSTAT) 0.4 MG SL tablet Place 1 tablet under the tongue every 5 minutes as needed for Chest pain 3/9/20  Yes Sami Alberto MD   clopidogrel (PLAVIX) 75 MG tablet TAKE 1 TABLET EVERY DAY (NEED MD APPOINTMENT FOR MORE REFILLS) 11/21/19  Yes Sami Alberto MD DULoxetine (CYMBALTA) 30 MG extended release capsule  9/16/19  Yes Historical Provider, MD   gabapentin (NEURONTIN) 300 MG capsule  9/16/19  Yes Historical Provider, MD   TRUEPLUS LANCETS 33G MISC CHECK BLOOD SUGAR 4 TIMES PER DAY AS NEEDED  Patient taking differently: CHECK BLOOD SUGAR 1 TIME PER DAY AS NEEDED 7/31/18  Yes Zahira Rico MD   amLODIPine (NORVASC) 10 MG tablet Take 1 tablet by mouth daily  Patient taking differently: Take 10 mg by mouth nightly  7/30/18  Yes Zahira Rico MD   SITagliptin (JANUVIA) 25 MG tablet Take 1 tablet by mouth daily 7/9/18  Yes Zahira Rico MD   Blood Glucose Monitoring Suppl (TRUE METRIX AIR GLUCOSE METER) NATASHA Check blood sugar as needed 5/24/18  Yes Zahira Rico MD   aspirin 81 MG tablet Take 81 mg by mouth daily   Yes Historical Provider, MD   ibuprofen (ADVIL) 200 MG CAPS Take 2 capsules by mouth 3 times daily   Yes Historical Provider, MD   atorvastatin (LIPITOR) 80 MG tablet TAKE 1 TABLET EVERY DAY 3/25/20   Sharon Castelan MD       Allergies:  Latex and Iodine    Social History:      The patient currently lives at home     TOBACCO:   reports that he quit smoking about 49 years ago. His smoking use included cigarettes. He has a 45.00 pack-year smoking history. He has never used smokeless tobacco.  ETOH:   reports no history of alcohol use. Family History:      Reviewed in detail and negative for DM, CAD, Cancer, CVA. Positive as follows:        Problem Relation Age of Onset    Heart Disease Father     Heart Disease Sister     Heart Disease Brother     Cancer Brother         testicular    Breast Cancer Sister        REVIEW OF SYSTEMS:   Pertinent positives as noted in the HPI. All other systems reviewed and negative.     PHYSICAL EXAM:    BP (!) 142/91   Pulse 70   Temp 96.6 °F (35.9 °C)   Resp 14   Ht 6' (1.829 m)   Wt 225 lb (102.1 kg)   SpO2 96%   BMI 30.52 kg/m²     General appearance:  No apparent distress, appears stated age and cooperative. HEENT:  Normal cephalic, atraumatic without obvious deformity. Pupils equal, round, and reactive to light. Extra ocular muscles intact. Conjunctivae/corneas clear. Neck: Supple, with full range of motion. No jugular venous distention. Trachea midline. Respiratory:  Normal respiratory effort. Clear to auscultation, bilaterally without RALES/WHEEZES/RHONCHI. Cardiovascular:  Regular rate and rhythm with normal S1/S2 without MURMUR, rubs or gallops. Abdomen: Soft, non-tender, non-distended with normal bowel sounds. Musculoskeletal:  No clubbing, cyanosis or EDEMA bilaterally. Full range of motion without deformity. Right wrist has been dressed  Skin: Skin color, texture, turgor normal.  No rashes or lesions. Neurologic:  Neurovascularly intact without any focal sensory/motor deficits. Cranial nerves: II-XII intact, grossly non-focal.  Psychiatric:  Alert and oriented, thought content appropriate, normal insight  Capillary Refill: Brisk,< 3 seconds   Peripheral Pulses: +2 palpable, equal bilaterally       Labs:     Recent Labs     11/22/20  1106   WBC 8.1   HGB 13.6   HCT 39.5*   *     Recent Labs     11/22/20  1106      K 4.4      CO2 27   BUN 17   CREATININE 1.0   CALCIUM 9.4     Recent Labs     11/22/20  1106   AST 15   ALT 13   BILITOT 0.6   ALKPHOS 104     No results for input(s): INR in the last 72 hours.   Recent Labs     11/22/20  1106   TROPONINI <0.01       Urinalysis:      Lab Results   Component Value Date    NITRU Negative 11/22/2020    BLOODU Negative 11/22/2020    SPECGRAV 1.018 11/22/2020    GLUCOSEU Negative 11/22/2020       Radiology:     CXR: I have reviewed the CXR with the following interpretation: nad  EKG:  I have reviewed the EKG with the following interpretation: Normal sinus rhythm, right bundle branch block  No acute changes  Compared to previous EKGs unchanged    CT CERVICAL SPINE WO CONTRAST   Final Result   No acute fracture or subluxation of the cervical spine. Severe multilevel spondylosis. CT HEAD WO CONTRAST   Final Result   No acute intracranial abnormality. XR CHEST PORTABLE   Final Result   No acute cardiopulmonary disease. Cardiomegaly without overt failure. ASSESSMENT/PLAN:    Active Hospital Problems    Diagnosis Date Noted    Syncope and collapse [R55] 11/22/2020       Acute Medical Issues Being Addressed:    78-year-old admitted to the hospital after a syncopal episode    Syncope unclear etiology  He does not clinically look dehydrated  Will get orthostatics  EKG unremarkable  We will trend out troponins  Given his cardiac history arrhythmia remain a possibility  We will put him on a cardiac monitor  Check echocardiogram  Cardiology consult  I do not think that this is a neurologic event  CT head negative  CT cervical spine negative    Type 2 diabetes mellitus  Hold oral medication  Sliding scale insulin    Hypertension  Continue home medications for now    Previous AAA repair    Coronary artery disease s/p CABG  Continue aspirin Plavix statin and beta-blocker    Of the above discussed with the patient and his wife was at the bedside  DVT Prophylaxis: Subcutaneous Lovenox  Diet: No diet orders on file  Code Status: Prior    PT/OT Eval Status: will have eval if appropriate given patient's condition    Dispo - once acute medical process is resolved       Kacie Baig MD    Thank you Samanta Dorsey MD for the opportunity to be involved in this patient's care. If you have any questions or concerns please feel free to contact me.

## 2020-11-23 LAB
ALBUMIN SERPL-MCNC: 3.8 G/DL (ref 3.4–5)
ALP BLD-CCNC: 93 U/L (ref 40–129)
ALT SERPL-CCNC: 11 U/L (ref 10–40)
AMMONIA: 38 UMOL/L (ref 16–60)
AST SERPL-CCNC: 13 U/L (ref 15–37)
BASE EXCESS ARTERIAL: 1.9 MMOL/L (ref -3–3)
BASOPHILS ABSOLUTE: 0 K/UL (ref 0–0.2)
BASOPHILS RELATIVE PERCENT: 0.4 %
BILIRUB SERPL-MCNC: 0.8 MG/DL (ref 0–1)
BILIRUBIN DIRECT: <0.2 MG/DL (ref 0–0.3)
BILIRUBIN, INDIRECT: ABNORMAL MG/DL (ref 0–1)
CARBOXYHEMOGLOBIN ARTERIAL: 1.4 % (ref 0–1.5)
EKG ATRIAL RATE: 65 BPM
EKG DIAGNOSIS: NORMAL
EKG P AXIS: -28 DEGREES
EKG P-R INTERVAL: 158 MS
EKG Q-T INTERVAL: 432 MS
EKG QRS DURATION: 138 MS
EKG QTC CALCULATION (BAZETT): 449 MS
EKG R AXIS: -29 DEGREES
EKG T AXIS: 5 DEGREES
EKG VENTRICULAR RATE: 65 BPM
EOSINOPHILS ABSOLUTE: 0.1 K/UL (ref 0–0.6)
EOSINOPHILS RELATIVE PERCENT: 1.6 %
ESTIMATED AVERAGE GLUCOSE: 134.1 MG/DL
FOLATE: 13.02 NG/ML (ref 4.78–24.2)
GLUCOSE BLD-MCNC: 100 MG/DL (ref 70–99)
GLUCOSE BLD-MCNC: 106 MG/DL (ref 70–99)
GLUCOSE BLD-MCNC: 116 MG/DL (ref 70–99)
GLUCOSE BLD-MCNC: 129 MG/DL (ref 70–99)
GLUCOSE BLD-MCNC: 140 MG/DL (ref 70–99)
HBA1C MFR BLD: 6.3 %
HCO3 ARTERIAL: 26.1 MMOL/L (ref 21–29)
HCT VFR BLD CALC: 39.2 % (ref 40.5–52.5)
HEMOGLOBIN, ART, EXTENDED: 13.5 G/DL (ref 13.5–17.5)
HEMOGLOBIN: 13.5 G/DL (ref 13.5–17.5)
INR BLD: 0.96 (ref 0.86–1.14)
LV EF: 60 %
LVEF MODALITY: NORMAL
LYMPHOCYTES ABSOLUTE: 1.1 K/UL (ref 1–5.1)
LYMPHOCYTES RELATIVE PERCENT: 16.8 %
MCH RBC QN AUTO: 32.8 PG (ref 26–34)
MCHC RBC AUTO-ENTMCNC: 34.5 G/DL (ref 31–36)
MCV RBC AUTO: 95.1 FL (ref 80–100)
METHEMOGLOBIN ARTERIAL: 0.2 %
MONOCYTES ABSOLUTE: 0.6 K/UL (ref 0–1.3)
MONOCYTES RELATIVE PERCENT: 8.7 %
NEUTROPHILS ABSOLUTE: 4.7 K/UL (ref 1.7–7.7)
NEUTROPHILS RELATIVE PERCENT: 72.5 %
O2 CONTENT ARTERIAL: 18 ML/DL
O2 SAT, ARTERIAL: 97.5 %
O2 THERAPY: NORMAL
PCO2 ARTERIAL: 38.7 MMHG (ref 35–45)
PDW BLD-RTO: 13.9 % (ref 12.4–15.4)
PERFORMED ON: ABNORMAL
PH ARTERIAL: 7.44 (ref 7.35–7.45)
PLATELET # BLD: 134 K/UL (ref 135–450)
PMV BLD AUTO: 9.6 FL (ref 5–10.5)
PO2 ARTERIAL: 82.9 MMHG (ref 75–108)
PROTHROMBIN TIME: 11.1 SEC (ref 10–13.2)
RBC # BLD: 4.12 M/UL (ref 4.2–5.9)
TCO2 ARTERIAL: 61.2 MMOL/L
TOTAL PROTEIN: 6.8 G/DL (ref 6.4–8.2)
TROPONIN: <0.01 NG/ML
TSH REFLEX: 0.61 UIU/ML (ref 0.27–4.2)
VITAMIN B-12: 229 PG/ML (ref 211–911)
WBC # BLD: 6.4 K/UL (ref 4–11)

## 2020-11-23 PROCEDURE — 6360000002 HC RX W HCPCS: Performed by: INTERNAL MEDICINE

## 2020-11-23 PROCEDURE — 97161 PT EVAL LOW COMPLEX 20 MIN: CPT

## 2020-11-23 PROCEDURE — 83036 HEMOGLOBIN GLYCOSYLATED A1C: CPT

## 2020-11-23 PROCEDURE — 2580000003 HC RX 258: Performed by: INTERNAL MEDICINE

## 2020-11-23 PROCEDURE — 93306 TTE W/DOPPLER COMPLETE: CPT

## 2020-11-23 PROCEDURE — 93010 ELECTROCARDIOGRAM REPORT: CPT | Performed by: INTERNAL MEDICINE

## 2020-11-23 PROCEDURE — 99215 OFFICE O/P EST HI 40 MIN: CPT | Performed by: INTERNAL MEDICINE

## 2020-11-23 PROCEDURE — 82607 VITAMIN B-12: CPT

## 2020-11-23 PROCEDURE — 84443 ASSAY THYROID STIM HORMONE: CPT

## 2020-11-23 PROCEDURE — 84484 ASSAY OF TROPONIN QUANT: CPT

## 2020-11-23 PROCEDURE — G0378 HOSPITAL OBSERVATION PER HR: HCPCS

## 2020-11-23 PROCEDURE — 36415 COLL VENOUS BLD VENIPUNCTURE: CPT

## 2020-11-23 PROCEDURE — 85610 PROTHROMBIN TIME: CPT

## 2020-11-23 PROCEDURE — 82140 ASSAY OF AMMONIA: CPT

## 2020-11-23 PROCEDURE — 82803 BLOOD GASES ANY COMBINATION: CPT

## 2020-11-23 PROCEDURE — 80076 HEPATIC FUNCTION PANEL: CPT

## 2020-11-23 PROCEDURE — 97165 OT EVAL LOW COMPLEX 30 MIN: CPT

## 2020-11-23 PROCEDURE — 82746 ASSAY OF FOLIC ACID SERUM: CPT

## 2020-11-23 PROCEDURE — 95816 EEG AWAKE AND DROWSY: CPT | Performed by: PSYCHIATRY & NEUROLOGY

## 2020-11-23 PROCEDURE — 85025 COMPLETE CBC W/AUTO DIFF WBC: CPT

## 2020-11-23 PROCEDURE — 6370000000 HC RX 637 (ALT 250 FOR IP): Performed by: INTERNAL MEDICINE

## 2020-11-23 PROCEDURE — 96372 THER/PROPH/DIAG INJ SC/IM: CPT

## 2020-11-23 PROCEDURE — 95819 EEG AWAKE AND ASLEEP: CPT

## 2020-11-23 RX ADMIN — Medication 10 ML: at 20:08

## 2020-11-23 RX ADMIN — ATORVASTATIN CALCIUM 80 MG: 80 TABLET, FILM COATED ORAL at 08:51

## 2020-11-23 RX ADMIN — ACETAMINOPHEN 650 MG: 325 TABLET ORAL at 08:59

## 2020-11-23 RX ADMIN — Medication 10 ML: at 09:00

## 2020-11-23 RX ADMIN — AMLODIPINE BESYLATE 10 MG: 5 TABLET ORAL at 20:07

## 2020-11-23 RX ADMIN — ENOXAPARIN SODIUM 40 MG: 40 INJECTION SUBCUTANEOUS at 08:51

## 2020-11-23 RX ADMIN — METOPROLOL SUCCINATE 25 MG: 50 TABLET, EXTENDED RELEASE ORAL at 20:07

## 2020-11-23 RX ADMIN — CLOPIDOGREL BISULFATE 75 MG: 75 TABLET ORAL at 08:59

## 2020-11-23 RX ADMIN — DULOXETINE HYDROCHLORIDE 30 MG: 30 CAPSULE, DELAYED RELEASE ORAL at 08:59

## 2020-11-23 RX ADMIN — TAMSULOSIN HYDROCHLORIDE 0.4 MG: 0.4 CAPSULE ORAL at 08:59

## 2020-11-23 RX ADMIN — ASPIRIN 81 MG: 81 TABLET, CHEWABLE ORAL at 08:59

## 2020-11-23 RX ADMIN — INSULIN LISPRO 1 UNITS: 100 INJECTION, SOLUTION INTRAVENOUS; SUBCUTANEOUS at 21:00

## 2020-11-23 RX ADMIN — LISINOPRIL 20 MG: 20 TABLET ORAL at 08:59

## 2020-11-23 ASSESSMENT — PAIN SCALES - GENERAL
PAINLEVEL_OUTOF10: 0

## 2020-11-23 NOTE — PROGRESS NOTES
[facility]    11/23/2020 11:33 AM        Name: Ami Perry . Admitted: 11/22/2020  Primary Care Provider: Jessie Coyne MD (Tel: [phone])      CC: Syncope    Subjective:  . Patient states he took 2 doses of Ultram at home night before syncope. No CP, SOB, HA or fevers. No palpitations prior to event.     Reviewed interval ancillary notes    Current Medications  amLODIPine (NORVASC) tablet 10 mg, Nightly  aspirin chewable tablet 81 mg, Daily  atorvastatin (LIPITOR) tablet 80 mg, Daily  clopidogrel (PLAVIX) tablet 75 mg, Daily  DULoxetine (CYMBALTA) extended release capsule 30 mg, Daily  lisinopril (PRINIVIL;ZESTRIL) tablet 20 mg, Daily  metoprolol succinate (TOPROL XL) extended release tablet 25 mg, Nightly  tamsulosin (FLOMAX) capsule 0.4 mg, Daily  sodium chloride flush 0.9 % injection 10 mL, 2 times per day  sodium chloride flush 0.9 % injection 10 mL, PRN  acetaminophen (TYLENOL) tablet 650 mg, Q6H PRN    Or  acetaminophen (TYLENOL) suppository 650 mg, Q6H PRN  polyethylene glycol (GLYCOLAX) packet 17 g, Daily PRN  promethazine (PHENERGAN) tablet 12.5 mg, Q6H PRN    Or  ondansetron (ZOFRAN) injection 4 mg, Q6H PRN  enoxaparin (LOVENOX) injection 40 mg, Daily  perflutren lipid microspheres (DEFINITY) injection 1.65 mg, ONCE PRN  glucose (GLUTOSE) 40 % oral gel 15 g, PRN  dextrose 50 % IV solution, PRN  glucagon (rDNA) injection 1 mg, PRN  dextrose 5 % solution, PRN  insulin lispro (1 Unit Dial) 0-6 Units, TID WC  insulin lispro (1 Unit Dial) 0-3 Units, Nightly        Objective:  /79   Pulse 97   Temp 99.1 °F (37.3 °C) (Temporal)   Resp 15   Ht 6' (1.829 m)   Wt 229 lb 15 oz (104.3 kg)   SpO2 100%   BMI 31.19 kg/m²     Intake/Output Summary (Last 24 hours) at 11/23/2020 1133  Last data filed at 11/23/2020 0932  Gross per 24 hour   Intake 480 ml   Output --   Net 480 ml      Wt Readings from Last 3 Encounters:   11/23/20 229 lb 15 oz (104.3 kg)   11/11/20 238 lb (108 kg)   09/14/20 235 lb 12.8 oz (107 kg)       General appearance:  Appears comfortable, obese, pleasant  Eyes: Sclera clear. Pupils equal.  ENT: Moist oral mucosa. Trachea midline, no adenopathy. Cardiovascular: Regular rhythm, normal S1, S2. No murmur. No edema in lower extremities  Respiratory: Not using accessory muscles. Good inspiratory effort. Clear to auscultation bilaterally, no wheeze or crackles. GI: Abdomen soft, obese, no tenderness, not distended, normal bowel sounds  Musculoskeletal: R hand is wrapped  Neurology: Grossly intact. No speech or motor deficits  Psych: Normal affect. Alert and oriented in time, place and person  Skin: Warm, dry, normal turgor  Extremity exam shows brisk capillary refill. Peripheral pulses are palpable in lower extremities     Labs and Tests:  CBC:   Recent Labs     11/22/20  1106 11/23/20  0326   WBC 8.1 6.4   HGB 13.6 13.5   * 134*     BMP:    Recent Labs     11/22/20  1106      K 4.4      CO2 27   BUN 17   CREATININE 1.0   GLUCOSE 120*     Hepatic:   Recent Labs     11/22/20  1106 11/23/20  0326   AST 15 13*   ALT 13 11   BILITOT 0.6 0.8   ALKPHOS 104 93     CT CERVICAL SPINE WO CONTRAST   Final Result   No acute fracture or subluxation of the cervical spine. Severe multilevel spondylosis. CT HEAD WO CONTRAST   Final Result   No acute intracranial abnormality. XR CHEST PORTABLE   Final Result   No acute cardiopulmonary disease. Cardiomegaly without overt failure. 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 9)  Resolved Problems:    * No resolved hospital problems. *       Assessment & Plan:   1.  Check MRI Brain to r/o stroke  2. Check EEG  3. Check Echo  4. Check ABG  5. Check Ammonia, TSH, B12  6. PT/OT eval  7. Cardio consult for syncope  8. Stop Tramadol (can cause seizures)  9. Tylenol PRN pain    IV Access: Peripheral  [place]: No  Diet: DIET CARDIAC;  Code:Full Code  DVT PPX Lovenox  Disposition Home    Discussed with patient and CVU RN. Will see what work up shows and what Cardio thinks. Home tomorrow. Monitor in observation today.       Mulugeta Marx MD   11/23/2020 11:33 AM

## 2020-11-23 NOTE — CONSULTS
[address]   Electrophysiology Consultation   Date: 11/23/2020  Reason for Consultation: Syncope  Consult Requesting Physician: Josiah Macias MD     Chief Complaint   Patient presents with    Fall     PT REPORTS THAT HE WOKE UP THIS MORNING AND WENT DOWN TO MAKE COFFEE. STATES HE PASSED OUT. + HIT HIS HEAD. ON A BLOOD THINNER,     HPI: Yuri Wall is a 78 y.o. male with history of CAD, CHF, diabetes, hypertension, obesity and spinal stenosis who follows with Dr. Marizol Garcia presented to the ED with complaint of a syncopal episode. he had carpal tunnel surgery to his right handthis past week. Patient states he woke up in the morning he went downstairs to make coffee. States he actually made coffee was going to sit down in his chair when apparently he had a syncopal episode. Patient states he woke up on the ground in the chair was broken and he had a contusion to the back of his head. He does not remember having a palpitation. He just felt dizzy and then next found himself on the floor. He did not have any sweating or hot flashes. He was however feeling not very well taking the pain medications.       Past Medical History:   Diagnosis Date    Acquired spondylolisthesis 1/29/2014    Arthritis     diffuse    CAD (coronary artery disease)     S/P PCI and CABG    Carotid disease, [facility] [address] 2/3/2014    Cholelithiasis 11/10/2014    Chronic systolic heart failure ([facility] [address]     Diabetes mellitus type II     Essential hypertension     Hyperlipidemia     Obesity (BMI 30.0-34.9) 2/27/2015    Prolonged emergence from general anesthesia     slow to wake up    Spinal stenosis of lumbar region 1/29/2014    Status post AAA (abdominal aortic aneurysm) repair 11/10/2014        Past Surgical History:   Procedure Laterality Date    ABDOMINAL AORTIC ANEURYSM REPAIR, ENDOVASCULAR  2009 [place]; stent    CAROTID ENDARTERECTOMY Left 2006    Priscila    CORONARY ANGIOPLASTY WITH STENT PLACEMENT  2009    CORONARY ARTERY BYPASS GRAFT  2002    Gage, x4    CYST REMOVAL Right 1997    wrist    ELBOW SURGERY Left 1984    laceration to tendons    HIP ARTHROPLASTY Left 1999    Fitzgerald    INGUINAL HERNIA REPAIR Left 1985    OTHER SURGICAL HISTORY  03/06/2018    excision of sebacious cyst on back    RETINAL DETACHMENT SURGERY Left 2004    [facility]    REVISION TOTAL HIP ARTHROPLASTY Left 9/23/2019    LEFT TOTAL HIP ARTHROPLASTY REVISION POSTERIOR APPROACH performed by Carmita Angeles MD at [facility] [address] Left 2/24/14    [facility] THYROID SURGERY  2012    biopsied at [address] Right 60/04/3809    UMBILICAL HERNIA REPAIR  1987    VASECTOMY  1975       Allergies   Allergen Reactions    Latex Swelling and Rash     Blisters. Pt reacts to a variety of products, including balloons, rubber bands, gloves, foam pillows, adhesive tape, ACE bandages.  Iodine      Contrast,       Social History:  Reviewed. reports that he quit smoking about 49 years ago. His smoking use included cigarettes. He has a 45.00 pack-year smoking history. He has never used smokeless tobacco. He reports that he does not drink alcohol or use drugs. Family History:  Reviewed. family history includes Breast Cancer in his sister; Cancer in his brother; Heart Disease in his brother, father, and sister. Review of System:  All other systems reviewed and are negative except for that noted above.  Pertinent negatives are:     · General: negative for fever, chills   · Ophthalmic ROS: negative for - eye pain or loss of vision  · ENT ROS: negative for - headaches, sore throat   · Respiratory: negative for - cough, sputum  · Cardiovascular: Reviewed in HPI  · Gastrointestinal: negative for - abdominal pain, diarrhea, N/V  · Hematology: negative for - bleeding, blood clots, bruising or jaundice  · Genito-Urinary:  negative for - Dysuria or incontinence  · Musculoskeletal: negative for - Joint swelling, muscle pain  · Neurological: negative for - confusion, dizziness, headaches   · Psychiatric: No anxiety, no depression. · Dermatological: negative for - rash    Physical Examination:  Vitals:    20 0435   BP:    Pulse: 77   Resp:    Temp:    SpO2:       In: 240 [P.O.:240]  Out: -    Wt Readings from Last 3 Encounters:   20 202 lb 1.6 oz (91.7 kg)   20 238 lb (108 kg)   20 235 lb 12.8 oz (107 kg)     Temp  Av.5 °F (36.9 °C)  Min: 96.6 °F (35.9 °C)  Max: 99.1 °F (37.3 °C)  Pulse  Av.8  Min: 70  Max: 94  BP  Min: 124/62  Max: 167/97  SpO2  Av.6 %  Min: 96 %  Max: 100 %    Intake/Output Summary (Last 24 hours) at 2020  Last data filed at 2020  Gross per 24 hour   Intake 240 ml   Output --   Net 240 ml       · Telemetry: Sinus rhythm   · Constitutional: Oriented. No distress. · Head: Normocephalic and atraumatic. · Mouth/Throat: Oropharynx is clear and moist.   · Eyes: Conjunctivae normal. EOM are normal.   · Neck: Neck supple. No rigidity. No JVD present. · Cardiovascular: Normal rate, regular rhythm, S1&S2. · Pulmonary/Chest: Bilateral respiratory sounds. No wheezes, No rhonchi. · Abdominal: Soft. Bowel sounds present. No distension, No tenderness. · Musculoskeletal: No tenderness. No edema splint  · Lymphadenopathy: Has no cervical adenopathy. · Neurological: Alert and oriented. Cranial nerve appears intact, No Gross deficit   · Skin: Skin is warm and dry. No rash noted. · Psychiatric: Has a normal behavior     Labs, diagnostic and imaging results reviewed. Reviewed.    Recent Labs     20  1106      K 4.4      CO2 27   BUN 17   CREATININE 1.0     Recent Labs     20  1106 20  0326   WBC 8.1 6.4   HGB 13.6 13.5   HCT 39.5* 39.2*   MCV 95.9 95.1   * 134*     Lab Results   Component Value Date    TROPONINI <0.01 2020     No results found for: BNP  Lab Results   Component Value Date    PROTIME 11. 11/23/2020    PROTIME 11.1 11/11/2020    PROTIME 10.3 09/18/2019    INR 0.96 11/23/2020    INR 0.96 11/11/2020    INR 0.90 09/18/2019     Lab Results   Component Value Date    CHOL 150 03/10/2020    HDL 38 03/10/2020    HDL 36 11/21/2011    TRIG 127 03/10/2020       ECG: Normal sinus rhythmRight bundle branch blockAbnormal   Echo: Preliminarily normal EF  Cath:   Stress test  June 2019  Conclusions          Summary     Small sized inferolateral fixed defect of mild intensity consistent with     infarction in the territory of the distal LCx and/or RCA .     Normal LV size and systolic function.             Scheduled Meds:   amLODIPine  10 mg Oral Nightly    aspirin  81 mg Oral Daily    atorvastatin  80 mg Oral Daily    clopidogrel  75 mg Oral Daily    DULoxetine  30 mg Oral Daily    lisinopril  20 mg Oral Daily    metoprolol succinate  25 mg Oral Nightly    tamsulosin  0.4 mg Oral Daily    sodium chloride flush  10 mL Intravenous 2 times per day    enoxaparin  40 mg Subcutaneous Daily    insulin lispro  0-6 Units Subcutaneous TID WC    insulin lispro  0-3 Units Subcutaneous Nightly     Continuous Infusions:   dextrose       PRN Meds:.traMADol, sodium chloride flush, acetaminophen **OR** acetaminophen, polyethylene glycol, promethazine **OR** ondansetron, perflutren lipid microspheres, glucose, dextrose, glucagon (rDNA), dextrose     Patient Active Problem List    Diagnosis Date Noted    Essential hypertension, benign 07/12/2011     Priority: High    CAD (coronary artery disease) 07/12/2011     Priority: High    Status post AAA (abdominal aortic aneurysm) repair 11/10/2014     Priority: Medium    Chronic systolic heart failure (HCC)      Priority: Low    Syncope and collapse 11/22/2020    Sebaceous cyst     Vestibular dizziness involving left inner ear 08/10/2017    Hyperlipidemia 01/27/2017    Arthritis of right knee 09/15/2015    Obesity (BMI 30.0-34.9) 02/27/2015    Cholelithiasis 11/10/2014  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 I think a combination of dehydration and possibly some vagal episode resulted in syncope. I do not see any evidence pointing to any arrhythmias. I will give him a 2-week Holter monitor and he can follow-up with Dr. Jose Ramon Bunn. as outpatient. -CAD     Status post CABG in 2002. He has no chest pain. Last stress test was negative and EF is normal.      -Hypertension     Resume his home blood pressure medication. At his outpatient follow-ups his blood pressure was controlled. -AAA    Mild aneurysmal dilatation and followed by PCP. -HLD  On a statin  I independently reviewed echo      Thank you for allowing me to participate in the care of Percy Jose     NOTE: This report was transcribed using voice recognition software. Every effort was made to ensure accuracy, however, inadvertent computerized transcription errors may be present.

## 2020-11-23 NOTE — PROGRESS NOTES
Physical Therapy    Facility/Department: [facility] CVU  Initial Assessment/Discharge     NAME: Olive Lipscomb  : 1940  MRN: [id number]    Date of Service: 2020    Discharge Recommendations: Olive Lipscomb scored a 24/24 on the AM-PAC short mobility form. At this time, no further PT is recommended upon discharge due to patient performing functional mobility at baseline of independence . Recommend patient returns to prior setting with prior services. Home independently   PT Equipment Recommendations  Equipment Needed: No    Assessment   Assessment: pt presents at his baseline function. Skilled PT is not needed at this time, pt is safe to return home once deemed medically appropriate. Prognosis: Excellent  Decision Making: Low Complexity  History: Arthritis, CAD, DM, HTN, hyperlipidemia, rotator cuff repair, CABG, cardiac stent placement  Clinical Presentation: stable  PT Education: Goals;PT Role;Plan of Care  Barriers to Learning: None  No Skilled PT: Independent with functional mobility   REQUIRES PT FOLLOW UP: No  Activity Tolerance  Activity Tolerance: Patient Tolerated treatment well       Patient Diagnosis(es): The encounter diagnosis was Syncope and collapse.     has a past medical history of Acquired spondylolisthesis, Arthritis, CAD (coronary artery disease), Carotid disease, bilateral ([address] Cholelithiasis, Chronic systolic heart failure ([address] Diabetes mellitus type II, Essential hypertension, Hyperlipidemia, Obesity (BMI 30.0-34.9), Prolonged emergence from general anesthesia, Spinal stenosis of lumbar region, and Status post AAA (abdominal aortic aneurysm) repair. has a past surgical history that includes Umbilical hernia repair (); Hip Arthroplasty (Left, ); Carotid endarterectomy (Left, ); AAA repair, endovascular (); Coronary artery bypass graft (); Vasectomy (); cyst removal (Right, ); Thyroid surgery ();  Retinal detachment surgery (Left, ); Coronary angioplasty with stent (2009); Rotator cuff repair (Left, 2/24/14); Inguinal hernia repair (Left, 1985); Elbow surgery (Left, 1984); Total knee arthroplasty (Right, 09/15/2015); other surgical history (03/06/2018); and Revision total hip arthroplasty (Left, 9/23/2019). Restrictions  Restrictions/Precautions  Restrictions/Precautions: Fall Risk(medium fall risk)  Required Braces or Orthoses?: No  Position Activity Restriction  Other position/activity restrictions: Alvaro Young is a 78 y.o. male with past medical strip CAD, CHF, diabetes, hypertension, obesity and spinal stenosis who presents to the ED with complaint of a syncopal episode. Patient states this past week he had carpal tunnel surgery to his right hand. Patient states he did not have general anesthesia and states he only had a nerve block. Patient states he does have splint in place at this time. Patient states he woke up this morning he went downstairs to make coffee. States he actually made coughing was going to sit down in his chair when apparently he had a syncopal episode. Patient states he woke up on the ground in the chair was broken and he had a contusion to the back of his head. Denies any laceration or bleeding. Patient states he is on blood thinners with Plavix and aspirin. Patient states he does take ibuprofen daily. Was placed on pain medication for his recent carpal tunnel surgery with Ultram.  Patient states he has been taking. States last dose last night. Patient states he has not taken any pain medication this morning. Patient states he does have a slight headache at this time to his posterior head where he hit it. Denies diffuse headache, visual changes, speech disturbances, numbness/tingling or lightheadedness/dizziness. Patient states he was able to get up and ambulate after the fall. Denies any injury otherwise throughout.   Denies chest pain, shortness of breath, abdominal pain, nausea/vomiting, urinary symptoms or changes in bowel movements. Denies fever chills. Denies rashes or lesions. Denies pleuritic pain, orthopnea, pedal edema or calf tenderness. Denies any neck pain. Vision/Hearing  Vision: Impaired  Vision Exceptions: Wears glasses at all times  Hearing: Within functional limits       Subjective  General  Chart Reviewed: Yes  Response To Previous Treatment: Not applicable  Family / Caregiver Present: No  Diagnosis: Syncope and collapse  Follows Commands: Within Functional Limits  General Comment  Comments: pt supine in bed upon arrival.  Subjective  Subjective: pt agreeable to PT/OT eval. Denies pain at this time.   Pain Screening  Patient Currently in Pain: Denies  Vital Signs  Patient Currently in Pain: Denies       Orientation  Orientation  Overall Orientation Status: Within Normal Limits     Social/Functional History  Social/Functional History  Lives With: Alone  Type of [facility]  Home Layout: Two level, Bed/Bath upstairs(B HR from 1st to 2nd floor)  Home Access: Stairs to enter with rails  Entrance Stairs - Number of Steps: 2 PATRICIA  Entrance Stairs - Rails: Right  Bathroom Shower/Tub: Walk-in shower  Bathroom Toilet: Handicap height  Bathroom Equipment: Grab bars in shower, Built-in shower seat, Hand-held shower  Bathroom Accessibility: Accessible  Home Equipment: Cane, Rolling walker, Wheelchair-manual, Grab bars, Reacher  ADL Assistance: Independent  Homemaking Assistance: Independent  Homemaking Responsibilities: Yes  Ambulation Assistance: Independent  Transfer Assistance: Independent  Active : Yes  Mode of Transportation: Truck  Occupation: Part time employment  Type of occupation:  about 20-25hrs a week  Leisure & Hobbies: play games, takes dog for walk  Additional Comments: denies other falls in the last 6 months    Objective     Observation/Palpation  Posture: Good    AROM RLE (degrees)  RLE AROM: WFL  AROM LLE (degrees)  LLE AROM : WFL  Strength RLE  Strength RLE: WFL  Comment: Assessed via functional mobility  Strength LLE  Strength LLE: WFL  Comment: Assessed via functional mobility  Tone RLE  RLE Tone: Normotonic  Tone LLE  LLE Tone: Normotonic  Motor Control  Gross Motor?: WFL  Sensation  Overall Sensation Status: WFL  Bed mobility  Rolling to Right: Modified independent  Supine to Sit: Modified independent  Scooting: Independent  Transfers  Sit to Stand: Independent  Stand to sit: Independent  Ambulation  Ambulation?: Yes  More Ambulation?: No  Ambulation 1  Surface: level tile  Device: No Device  Assistance: Independent  Quality of Gait: pt ambulate with normal aracelis and step legnth. pt demonstrates decreased arm swing and trunk rotation. No LOB. Distance: ~300 ft. Stairs/Curb  Stairs?: Yes  Stairs  Stairs Height: 6\"  Rails: Left ascending  Device: No Device  Assistance: Modified independent      Balance  Posture: Good  Sitting - Static: Good  Sitting - Dynamic: Good  Standing - Static: Good  Standing - Dynamic: Good        Plan   Plan  Times per week: D/C  Safety Devices  Type of devices: All fall risk precautions in place, Call light within reach, Gait belt, Left in chair, Nurse notified  Restraints  Initially in place: No    AM-PAC Score  AM-PAC Inpatient Mobility Raw Score : 24 (11/23/20 1149)  AM-PAC Inpatient T-Scale Score : 61.14 (11/23/20 1149)  Mobility Inpatient CMS 0-100% Score: 0 (11/23/20 1149)  Mobility Inpatient CMS G-Code Modifier : [address] (11/23/20 1149)          Goals  Short term goals  Time Frame for Short term goals: Discharge       Therapy Time   Individual Concurrent Group Co-treatment   Time In 1050         Time Out 1121         Minutes 31           Timed Code Treatment Minutes: 0      Total Treatment Minutes:  31  Time spent with pt shared with OT as pt is under observation status. As such, pt is being billed 1 unit for evaluation.        Lidia Awad SPT    PT providing direct supervision during session and assisting in making skilled judgements throughout session.   [address] [place] [address] PT, DPT [id number]   [address] [place] [address], PT

## 2020-11-23 NOTE — CARE COORDINATION
Patient admitted as Observation/OPIB or Inpatient status with a Readmission Risk Score of 18% or less. with an anticipated short hospitalization length of stay. Chart reviewed and patient lives in private residence. The patient is independent in ADLs and IADLs and has presented with no needs for discharge at this time. Discussed with patient's nurse and requested that case management be notified if discharge needs are identified. Does not qualify for [place] [id number] with AM-PAC of 24/23. Case Management will continue to follow progress and update discharge plan as needed.     Petra SIMPSONN, RN  RN Case Manager  [phone]

## 2020-11-23 NOTE — FLOWSHEET NOTE
Message left with MRI. No correspondence achieved. Will continue to monitor.  Electronically signed by Rabia Stockton RN on 11/23/2020 at 4:39 PM

## 2020-11-23 NOTE — PROCEDURES
Patient: Samir Ramirez    MR Number: [id number]  YOB: 1940  Date of Visit: 11/23/2020    Clinical History:  The patient is a 78y.o. years old male with acute syncope. Medications reviewed. Method: The EEG was performed utilizing the international 10/20 of electrode placements of both referential and bipolar montages. The patient was awake and drowsy through out the recording. Photic stimulation did not activate EEG. Findings: The background of the EEG showed normal alpha posterior background of 8-9 HZ and amplitude of 20-40 UV. This background was symmetric, waxing and waning, and reactive with eye opening and closure. As the patient became drowsy, generalized diffuse slowing was seen through recording at 6-7 HZ. This generalized slowing was symmetric, non rhythmical, and continuous. No spike or sharp waves were seen. Impression: This EEG  is within normal limits. There is no evidence of epileptiform discharges, focal, or lateralizing abnormalities.       Federica Hayden MD      Board certified in clinical neurophysiology

## 2020-11-23 NOTE — ACP (ADVANCE CARE PLANNING)
Advanced Care Planning Note. Purpose of Encounter: Advanced care planning in light of CAD  Parties In Attendance: Patient  Decisional Capacity: Yes  Subjective: Patient with syncope at home  Objective: Hg 13.5  Goals of Care Determination: Patient wants full support (CPR, vent, surgery, HD, trach, PEG)  Plan:  MRI Brain, Echo, EEG, Cardio consult  Code Status: Full code. Time spent on Advanced care Plannin minutes  Advanced Care Planning Documents: Completed advanced directives on chart, children share the POA.     Meli Pena MD  2020 8:13 AM

## 2020-11-23 NOTE — PROGRESS NOTES
Occupational Therapy   Occupational Therapy Initial Assessment and Discharge  Date: 2020   Patient Name: Wero Gage  MRN: [id number]     : 1940    Date of Service: 2020    Discharge Recommendations:  Wero Gage scored a 23/24 on the AM-PAC ADL Inpatient form. Current research shows that an AM-PAC score of 18 or greater is typically associated with a discharge to the patient's home setting. Does not need follow up therapy at this time. If patient discharges prior to next session this note will serve as a discharge summary. Please see below for the latest assessment towards goals. OT Equipment Recommendations  Equipment Needed: No    Assessment   Assessment: Pt is at baseline level of function and safe to D/C when medically appropriate without need for further therapy from inpatient perspective  Prognosis: Good  Decision Making: Low Complexity  OT Education: OT Role;Plan of Care;ADL Adaptive Strategies;Transfer Training;IADL Safety  Patient Education: Pt verbalized and demo' understanding  No Skilled OT: Safe to return home; At baseline function  REQUIRES OT FOLLOW UP: No  Activity Tolerance  Activity Tolerance: Patient Tolerated treatment well  Safety Devices  Safety Devices in place: Yes  Type of devices: All fall risk precautions in place;Call light within reach;Gait belt;Left in chair;Nurse notified           Patient Diagnosis(es): The encounter diagnosis was Syncope and collapse.     has a past medical history of Acquired spondylolisthesis, Arthritis, CAD (coronary artery disease), Carotid disease, bilateral ([address] Cholelithiasis, Chronic systolic heart failure ([address] Diabetes mellitus type II, Essential hypertension, Hyperlipidemia, Obesity (BMI 30.0-34.9), Prolonged emergence from general anesthesia, Spinal stenosis of lumbar region, and Status post AAA (abdominal aortic aneurysm) repair. has a past surgical history that includes Umbilical hernia repair ();  Hip Arthroplasty (Left, 1999); Carotid endarterectomy (Left, 2006); AAA repair, endovascular (2009); Coronary artery bypass graft (2002); Vasectomy (1975); cyst removal (Right, 1997); Thyroid surgery (2012); Retinal detachment surgery (Left, 2004); Coronary angioplasty with stent (2009); Rotator cuff repair (Left, 2/24/14); Inguinal hernia repair (Left, 1985); Elbow surgery (Left, 1984); Total knee arthroplasty (Right, 09/15/2015); other surgical history (03/06/2018); and Revision total hip arthroplasty (Left, 9/23/2019). Restrictions  Restrictions/Precautions  Restrictions/Precautions: Fall Risk(medium fall risk)  Required Braces or Orthoses?: No  Position Activity Restriction  Other position/activity restrictions: Anthony De La Cruz is a 78 y.o. male with past medical strip CAD, CHF, diabetes, hypertension, obesity and spinal stenosis who presents to the ED with complaint of a syncopal episode. Patient states this past week he had carpal tunnel surgery to his right hand. Patient states he did not have general anesthesia and states he only had a nerve block. Patient states he does have splint in place at this time. Patient states he woke up this morning he went downstairs to make coffee. States he actually made coughing was going to sit down in his chair when apparently he had a syncopal episode. Patient states he woke up on the ground in the chair was broken and he had a contusion to the back of his head. Denies any laceration or bleeding. Patient states he is on blood thinners with Plavix and aspirin. Patient states he does take ibuprofen daily. Was placed on pain medication for his recent carpal tunnel surgery with Ultram.  Patient states he has been taking. States last dose last night. Patient states he has not taken any pain medication this morning. Patient states he does have a slight headache at this time to his posterior head where he hit it.   Denies diffuse headache, visual changes, speech disturbances, numbness/tingling or lightheadedness/dizziness. Patient states he was able to get up and ambulate after the fall. Denies any injury otherwise throughout. Denies chest pain, shortness of breath, abdominal pain, nausea/vomiting, urinary symptoms or changes in bowel movements. Denies fever chills. Denies rashes or lesions. Denies pleuritic pain, orthopnea, pedal edema or calf tenderness. Denies any neck pain.     Subjective   General  Chart Reviewed: Yes  Response to previous treatment: Patient with no complaints from previous session  Family / Caregiver Present: No  Diagnosis: Syncope and collapse  Subjective  Subjective: Pt  supine in bed upon arrival, agreeable to eval.  General Comment  Comments: BP obtained supine: 129/64, seated: 134/84, and standin/66  Patient Currently in Pain: Denies  Vital Signs  Patient Currently in Pain: Denies  Social/Functional History  Social/Functional History  Lives With: Alone  Type of Home: Apartment([facility])  Home Layout: Two level, Bed/Bath upstairs(B HR from 1st to 2nd floor)  Home Access: Stairs to enter with rails  Entrance Stairs - Number of Steps: 2 PATRICIA  Entrance Stairs - Rails: Right  Bathroom Shower/Tub: Walk-in shower  Bathroom Toilet: Handicap height  Bathroom Equipment: Grab bars in shower, Built-in shower seat, Hand-held shower  Bathroom Accessibility: Accessible  Home Equipment: Cane, Rolling walker, Wheelchair-manual, Grab bars, Reacher  ADL Assistance: Independent  Homemaking Assistance: Independent  Homemaking Responsibilities: Yes  Ambulation Assistance: Independent  Transfer Assistance: Independent  Active : Yes  Mode of Transportation: Truck  Occupation: Part time employment  Type of occupation:  about 20-25hrs a week  Leisure & Hobbies: play games, takes dog for walk  Additional Comments: denies other falls in the last 6 months       Objective   Vision: Impaired  Vision Exceptions: Wears glasses at all times  Hearing: Within functional limits    Orientation  Overall Orientation Status: Within Normal Limits  Observation/Palpation  Posture: Good  Balance  Sitting Balance: Independent  Standing Balance: Independent  Standing Balance  Comment: went up/down 3 steps w/ L HR ascending independently  Functional Mobility  Functional - Mobility Device: No device  Activity: To/from bathroom; Other  Assist Level: Independent  Functional Mobility Comments: ambulated 270 feet in hallway  Toilet Transfers  Toilet - Technique: Ambulating  Equipment Used: Standard toilet  Toilet Transfer: Independent  ADL  Additional Comments: Pt declined ADLs; simulated doffing/donning socks and pt needed total A; reports he can don underwear and pants and wears slip on shoes on home so does not need to take socks/shoes on/off  Tone RUE  RUE Tone: Normotonic  Tone LUE  LUE Tone: Normotonic  Coordination  Movements Are Fluid And Coordinated: Yes  Coordination and Movement description: Fine motor impairments;Right UE     Bed mobility  Supine to Sit: Modified independent  Transfers  Stand Step Transfers: Independent  Sit to stand: Independent  Stand to sit: Independent  Vision - Basic Assessment  Prior Vision: Wears glasses all the time  Visual History: No significant visual history  Patient Visual Report: No visual complaint reported.   Cognition  Overall Cognitive Status: WNL  Perception  Overall Perceptual Status: WFL     Sensation  Overall Sensation Status: WFL        LUE AROM (degrees)  LUE AROM : WNL  RUE AROM (degrees)  RUE AROM : WNL  RUE General AROM: R wrist/hand not assess d/t recent sx  LUE Strength  Gross LUE Strength: WFL  RUE Strength  Gross RUE Strength: WFL  RUE Strength Comment: R hand not assess d/t recent sx        AM-PAC Score        AM-[facility] Inpatient Daily Activity Raw Score: 23 (11/23/20 1151)  AM-PAC Inpatient ADL T-Scale Score : 51.12 (11/23/20 1151)  ADL Inpatient CMS 0-100% Score: 15.86 (11/23/20 1151)  ADL Inpatient CMS G-Code Modifier : CI

## 2020-11-24 ENCOUNTER — APPOINTMENT (OUTPATIENT)
Dept: MRI IMAGING | Age: 80
End: 2020-11-24
Payer: MEDICARE

## 2020-11-24 VITALS
HEART RATE: 74 BPM | SYSTOLIC BLOOD PRESSURE: 117 MMHG | HEIGHT: 72 IN | OXYGEN SATURATION: 99 % | WEIGHT: 228.6 LBS | TEMPERATURE: 98.6 F | RESPIRATION RATE: 20 BRPM | BODY MASS INDEX: 30.96 KG/M2 | DIASTOLIC BLOOD PRESSURE: 66 MMHG

## 2020-11-24 LAB
ANION GAP SERPL CALCULATED.3IONS-SCNC: 9 MMOL/L (ref 3–16)
BASOPHILS ABSOLUTE: 0 K/UL (ref 0–0.2)
BASOPHILS RELATIVE PERCENT: 0.9 %
BUN BLDV-MCNC: 26 MG/DL (ref 7–20)
CALCIUM SERPL-MCNC: 9.4 MG/DL (ref 8.3–10.6)
CHLORIDE BLD-SCNC: 102 MMOL/L (ref 99–110)
CO2: 26 MMOL/L (ref 21–32)
CREAT SERPL-MCNC: 1.2 MG/DL (ref 0.8–1.3)
EOSINOPHILS ABSOLUTE: 0.2 K/UL (ref 0–0.6)
EOSINOPHILS RELATIVE PERCENT: 3.2 %
GFR AFRICAN AMERICAN: >60
GFR NON-AFRICAN AMERICAN: 58
GLUCOSE BLD-MCNC: 107 MG/DL (ref 70–99)
GLUCOSE BLD-MCNC: 120 MG/DL (ref 70–99)
GLUCOSE BLD-MCNC: 91 MG/DL (ref 70–99)
HCT VFR BLD CALC: 39.5 % (ref 40.5–52.5)
HEMOGLOBIN: 13.4 G/DL (ref 13.5–17.5)
LYMPHOCYTES ABSOLUTE: 1.2 K/UL (ref 1–5.1)
LYMPHOCYTES RELATIVE PERCENT: 23.6 %
MCH RBC QN AUTO: 32.3 PG (ref 26–34)
MCHC RBC AUTO-ENTMCNC: 33.9 G/DL (ref 31–36)
MCV RBC AUTO: 95.5 FL (ref 80–100)
MONOCYTES ABSOLUTE: 0.4 K/UL (ref 0–1.3)
MONOCYTES RELATIVE PERCENT: 8.1 %
NEUTROPHILS ABSOLUTE: 3.3 K/UL (ref 1.7–7.7)
NEUTROPHILS RELATIVE PERCENT: 64.2 %
PDW BLD-RTO: 13.9 % (ref 12.4–15.4)
PERFORMED ON: ABNORMAL
PERFORMED ON: NORMAL
PLATELET # BLD: 144 K/UL (ref 135–450)
PMV BLD AUTO: 9.3 FL (ref 5–10.5)
POTASSIUM SERPL-SCNC: 3.9 MMOL/L (ref 3.5–5.1)
RBC # BLD: 4.14 M/UL (ref 4.2–5.9)
SODIUM BLD-SCNC: 137 MMOL/L (ref 136–145)
WBC # BLD: 5.2 K/UL (ref 4–11)

## 2020-11-24 PROCEDURE — G0378 HOSPITAL OBSERVATION PER HR: HCPCS

## 2020-11-24 PROCEDURE — 85025 COMPLETE CBC W/AUTO DIFF WBC: CPT

## 2020-11-24 PROCEDURE — 96372 THER/PROPH/DIAG INJ SC/IM: CPT

## 2020-11-24 PROCEDURE — 6370000000 HC RX 637 (ALT 250 FOR IP): Performed by: INTERNAL MEDICINE

## 2020-11-24 PROCEDURE — 70551 MRI BRAIN STEM W/O DYE: CPT

## 2020-11-24 PROCEDURE — 80048 BASIC METABOLIC PNL TOTAL CA: CPT

## 2020-11-24 PROCEDURE — 0296T PR EXT ECG > 48HR TO 21 DAY RCRD W/CONECT INTL RCRD: CPT | Performed by: INTERNAL MEDICINE

## 2020-11-24 PROCEDURE — 2580000003 HC RX 258: Performed by: INTERNAL MEDICINE

## 2020-11-24 PROCEDURE — 6360000002 HC RX W HCPCS: Performed by: INTERNAL MEDICINE

## 2020-11-24 RX ADMIN — LISINOPRIL 20 MG: 20 TABLET ORAL at 09:14

## 2020-11-24 RX ADMIN — ATORVASTATIN CALCIUM 80 MG: 80 TABLET, FILM COATED ORAL at 09:14

## 2020-11-24 RX ADMIN — TAMSULOSIN HYDROCHLORIDE 0.4 MG: 0.4 CAPSULE ORAL at 09:14

## 2020-11-24 RX ADMIN — ENOXAPARIN SODIUM 40 MG: 40 INJECTION SUBCUTANEOUS at 09:14

## 2020-11-24 RX ADMIN — Medication 10 ML: at 09:15

## 2020-11-24 RX ADMIN — CLOPIDOGREL BISULFATE 75 MG: 75 TABLET ORAL at 09:14

## 2020-11-24 RX ADMIN — DULOXETINE HYDROCHLORIDE 30 MG: 30 CAPSULE, DELAYED RELEASE ORAL at 09:14

## 2020-11-24 RX ADMIN — ASPIRIN 81 MG: 81 TABLET, CHEWABLE ORAL at 09:14

## 2020-11-24 ASSESSMENT — PAIN DESCRIPTION - LOCATION
LOCATION: HAND
LOCATION: HAND

## 2020-11-24 ASSESSMENT — PAIN DESCRIPTION - ORIENTATION
ORIENTATION: RIGHT
ORIENTATION: RIGHT

## 2020-11-24 ASSESSMENT — PAIN SCALES - GENERAL
PAINLEVEL_OUTOF10: 1
PAINLEVEL_OUTOF10: 0
PAINLEVEL_OUTOF10: 1
PAINLEVEL_OUTOF10: 0
PAINLEVEL_OUTOF10: 0

## 2020-11-24 ASSESSMENT — PAIN DESCRIPTION - PAIN TYPE
TYPE: ACUTE PAIN
TYPE: ACUTE PAIN

## 2020-11-24 ASSESSMENT — PAIN - FUNCTIONAL ASSESSMENT
PAIN_FUNCTIONAL_ASSESSMENT: ACTIVITIES ARE NOT PREVENTED
PAIN_FUNCTIONAL_ASSESSMENT: ACTIVITIES ARE NOT PREVENTED

## 2020-11-24 NOTE — PROGRESS NOTES
Call placed to cardiology nurse to bring cardiac monitor for patient to wear at home, per her request.  Left message.

## 2020-11-24 NOTE — PROGRESS NOTES
Discharge instructions reviewed with patient member. Patient verbalized understanding. All home medications have been reviewed, questions answered and patient voiced understanding. All medication side effects reviewed and patient verbalized understanding. Follow up appointment(s) reviewed with patient and all attempts made to schedule within 7-10 days of discharge. Patient will call his family physician to schedule a closer follow up appointment. His Hydrochlorothiazide was discontinued and his PCP will need to see him to ensure that his CHF remains controlled. Patient and daughter verbalized understanding. Patient given no new prescriptions, but discharge instructions, and instructions about follow up appointment times. Patient discharged to home with family via private car. Taken to lobby via wheelchair for discharge home. CHF education reinforced.

## 2020-11-24 NOTE — DISCHARGE SUMMARY
Hospital Medicine Discharge Summary    Patient: Manuel Crystal     Gender: male  : 1940   Age: 78 y.o. MRN: [id number]    Admitting Physician: Usha Rodriguez MD  Discharge Physician: Kana Patel MD     Code Status: Full Code     Admit Date: 2020   Discharge Date:   2020    Disposition:  Home with cardiac monitor     Discharge Diagnoses: Active Hospital Problems    Diagnosis Date Noted    CAD (coronary artery disease) [I25.10] 2011     Priority: High    Essential hypertension, benign [I10] 2011     Priority: High    Syncope and collapse [R55] 2020    Obesity (BMI 30.0-34. 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 male with history of CAD, CHF, diabetes, hypertension, obesity and spinal stenosis presented to the ED with complaint of a syncopal episode. He had carpal tunnel surgery to his right hand this past week. Patient states he woke up in the morning he went downstairs to make coffee. States he actually made coffee was going to sit down in his chair when apparently he had a syncopal episode. Patient states he woke up on the ground in the chair was broken and he had a contusion to the back of his head. He does not remember having a palpitation. He just felt dizzy and then next found himself on the floor. He did not have any sweating or hot flashes.   He was however feeling not very well taking the pain medications for the hand.         Discharge Medications:   Current Discharge Medication List        Current Discharge Medication List        Current Discharge Medication List      CONTINUE these medications which have NOT CHANGED    Details   lisinopril Current Discharge Medication List      STOP taking these medications       traMADol (ULTRAM) 50 MG tablet Comments:   Reason for Stopping:         hydroCHLOROthiazide (HYDRODIURIL) 25 MG tablet Comments:   Reason for Stopping:               Discharge ROS:  A complete review of systems was asked and negative except for waiting for MRI    Discharge Exam:    /66   Pulse 74   Temp 98.6 °F (37 °C) (Temporal)   Resp 20   Ht 6' (1.829 m)   Wt 228 lb 9.6 oz (103.7 kg)   SpO2 99%   BMI 31.00 kg/m²   General appearance:  NAD  HEENT:   Normal cephalic, atraumatic, moist mucous membranes, no oropharyngeal erythema or exudate  Neck: Supple, trachea midline, no anterior cervical or SC LAD  Heart[de-identified] Normal s1/s2, RRR, no murmurs, gallops, or rubs. No  leg edema  Lungs:   Clear to auscultation, bilaterally without Rales/Wheezes/Rhonchi. Abdomen: Soft, non-tender, non-distended, bowel sounds present, no masses  Musculoskeletal:  No clubbing, no cyanosis,no  Edema, right hand is bandaged and ace wrapped from surgery. Skin: No lesion or masses  Neurologic:  Neurovascularly intact without any focal sensory/motor deficits. Cranial nerves: II-XII intact, grossly non-focal.  Psychiatric:  A & O x3  Neuro: Grossly intact, moves all four extremities     Labs:  For convenience and continuity at follow-up the following most recent labs are provided:    Lab Results   Component Value Date    WBC 5.2 11/24/2020    HGB 13.4 11/24/2020    HCT 39.5 11/24/2020    MCV 95.5 11/24/2020     11/24/2020     11/24/2020    K 3.9 11/24/2020    K 4.4 11/22/2020     11/24/2020    CO2 26 11/24/2020    BUN 26 11/24/2020    CREATININE 1.2 11/24/2020    CALCIUM 9.4 11/24/2020    PHOS 3.1 03/10/2020    ALKPHOS 93 11/23/2020    ALT 11 11/23/2020    AST 13 11/23/2020    BILITOT 0.8 11/23/2020    BILIDIR <0.2 11/23/2020    LABALBU 3.8 11/23/2020    LDLCALC 87 03/10/2020    TRIG 127 03/10/2020     Lab Results   Component Value Date INR 0.96 11/23/2020    INR 0.96 11/11/2020    INR 0.90 09/18/2019       Radiology:  Ct Head Wo Contrast    Result Date: 11/22/2020  EXAMINATION: CT OF THE HEAD WITHOUT CONTRAST  11/22/2020 10:24 am TECHNIQUE: CT of the head was performed without the administration of intravenous contrast. Dose modulation, iterative reconstruction, and/or weight based adjustment of the mA/kV was utilized to reduce the radiation dose to as low as reasonably achievable. COMPARISON: None. HISTORY: ORDERING SYSTEM PROVIDED HISTORY: fall TECHNOLOGIST PROVIDED HISTORY: Has a \"code stroke\" or \"stroke alert\" been called? ->No Reason for exam:->fall Reason for Exam: fall Acuity: Acute Type of Exam: Initial Mechanism of Injury: fall FINDINGS: BRAIN/VENTRICLES: There is no acute intracranial hemorrhage, mass effect or midline shift. No abnormal extra-axial fluid collection. The gray-white differentiation is maintained without evidence of an acute infarct. There is no evidence of hydrocephalus. Mild prominence of the ventricular and cisternal spaces, appropriate for patient age. Intracranial atherosclerotic vascular calcification. ORBITS: The visualized portion of the orbits demonstrate no acute abnormality. SINUSES: Mucosal thickening in the ethmoid air cells and left frontal sinus. The mastoid air cells are clear. SOFT TISSUES/SKULL:  No acute abnormality of the visualized skull or soft tissues. No acute intracranial abnormality. Ct Cervical Spine Wo Contrast    Result Date: 11/22/2020  EXAMINATION: CT OF THE CERVICAL SPINE WITHOUT CONTRAST 11/22/2020 10:24 am TECHNIQUE: CT of the cervical spine was performed without the administration of intravenous contrast. Multiplanar reformatted images are provided for review. Dose modulation, iterative reconstruction, and/or weight based adjustment of the mA/kV was utilized to reduce the radiation dose to as low as reasonably achievable. COMPARISON: None.  HISTORY: ORDERING SYSTEM PROVIDED HISTORY: fall TECHNOLOGIST PROVIDED HISTORY: Reason for exam:->fall Reason for Exam: fall Acuity: Acute Type of Exam: Initial Mechanism of Injury: fall FINDINGS: BONES/ALIGNMENT: There is no acute fracture or traumatic malalignment. DEGENERATIVE CHANGES: There is generalized osteopenia. Disc space narrowing and endplate osteophyte formation is noted at multiple levels. There are prominent anterior osteophytes at C3-4, C4-5 and C5-6. Posterior disc osteophyte complexes are noted at multiple levels, most prominently at C6-7. Severe facet arthropathy is at multiple levels. SOFT TISSUES: There is no prevertebral soft tissue swelling. No acute fracture or subluxation of the cervical spine. Severe multilevel spondylosis. Xr Chest Portable    Result Date: 11/22/2020  EXAMINATION: ONE XRAY VIEW OF THE CHEST 11/22/2020 10:28 am COMPARISON: 03/16/2009 HISTORY: ORDERING SYSTEM PROVIDED HISTORY: syncope TECHNOLOGIST PROVIDED HISTORY: Reason for exam:->syncope Reason for Exam: Fall (PT REPORTS THAT HE WOKE UP THIS MORNING AND WENT DOWN TO MAKE COFFEE. STATES HE PASSED OUT. + HIT HIS HEAD. ON A BLOOD THINNER,). Had carpal tunnel surgery this past Friday Acuity: Acute Type of Exam: Initial FINDINGS: The cardiac silhouette is enlarged and stable status post median sternotomy. The lungs are clear. No pleural fluid or evidence of overt failure. No focal infiltrate is identified. No acute cardiopulmonary disease. Cardiomegaly without overt failure. Mri Brain Wo Contrast    Result Date: 11/24/2020  EXAMINATION: MRI OF THE BRAIN WITHOUT CONTRAST  11/24/2020 3:02 pm TECHNIQUE: Multiplanar multisequence MRI of the brain was performed without the administration of intravenous contrast. COMPARISON: None.  HISTORY: ORDERING SYSTEM PROVIDED HISTORY: Syncope TECHNOLOGIST PROVIDED HISTORY: Reason for exam:->Syncope Reason for Exam: PT BEING EVALUATED FOR SYNCOPAL EPISODE SEVERAL DAYS AGO Acuity: Acute Type of Exam: Initial Additional signs and symptoms: PT BEING EVALUATED FOR SYNCOPAL EPISODE SEVERAL DAYS AGO Relevant Medical/Surgical History: PT BEING EVALUATED FOR SYNCOPAL EPISODE SEVERAL DAYS AGO FINDINGS: INTRACRANIAL STRUCTURES/VENTRICLES: There is no acute infarct or mass. Parenchymal volume is commensurate with age. Minimal chronic white matter microvascular ischemic changes are present. No mass effect or midline shift. No evidence of an acute intracranial hemorrhage. The ventricles and sulci are normal in size and configuration. The sellar/suprasellar regions appear unremarkable. The normal signal voids within the major intracranial vessels appear maintained. ORBITS: Left intra-ocular lens implant is noted. Orbital structures are otherwise unremarkable. SINUSES: Mild scattered paranasal sinus mucoperiosteal thickening. No mastoid effusion. BONES/SOFT TISSUES: The bone marrow signal intensity appears normal. The soft tissues demonstrate no acute abnormality. 1. No acute intracranial abnormality. 2. Minimal chronic white matter microvascular ischemic changes. EKG   Normal sinus rhythmRight bundle branch block   reltively unchanged when compared to previous      The patient was seen and examined on day of discharge and this discharge summary is in conjunction with any daily progress note from day of discharge. Time Spent on discharge is 35 minutes   in the examination, evaluation, counseling and review of medications and discharge plan. Note that greater  than 30 minutes was spent in preparing discharge papers, discussing discharge with patient, medication review, etc.       Signed:    Kana Patel MD   11/24/2020      Thank you Claudia Knight MD for the opportunity to be involved in this patient's care.  If you have any questions or concerns please feel free to contact me at [phone]

## 2020-11-30 ENCOUNTER — OFFICE VISIT (OUTPATIENT)
Dept: FAMILY MEDICINE CLINIC | Age: 80
End: 2020-11-30
Payer: MEDICARE

## 2020-11-30 VITALS
TEMPERATURE: 96.8 F | SYSTOLIC BLOOD PRESSURE: 128 MMHG | BODY MASS INDEX: 31.74 KG/M2 | WEIGHT: 234 LBS | OXYGEN SATURATION: 98 % | DIASTOLIC BLOOD PRESSURE: 88 MMHG | HEART RATE: 88 BPM

## 2020-11-30 PROCEDURE — 99214 OFFICE O/P EST MOD 30 MIN: CPT | Performed by: FAMILY MEDICINE

## 2020-11-30 RX ORDER — CLOPIDOGREL BISULFATE 75 MG/1
TABLET ORAL
Qty: 90 TABLET | Refills: 3 | Status: SHIPPED | OUTPATIENT
Start: 2020-11-30 | End: 2021-03-22 | Stop reason: SDUPTHER

## 2020-11-30 NOTE — PROGRESS NOTES
[address] Note    Date: 11/30/2020                                               Subjective/Objective:     Chief Complaint   Patient presents with    Follow-Up from Hospital       HPI   Patient is here for hospital follow-up visit. Patient presents to the hospital on 11/22/2020 with complaints of syncopal episode. Patient had undergone carpal tunnel surgery in the right side 3 days prior. He had taken pain medicine at 5 PM on the 21st and then again delirium. When he woke up that morning he went down to the kitchen and then sat down and the next thing he remembers was waking up on the floor. He came to after about 10 minutes and was able to get up. Was brought to the ED. He was admitted to the hospital, his EKG was unremarkable. Troponins were negative. He was placed on cardiac monitor and had an echocardiogram and a cardiology consult. CT of the head was negative and CT of the cervical spine was negative. Patient has history of CAD status post CABG. He was continued on medications. Echo showed normal EF. Cardiology said it was likely vasovagal and possibly dehydration. Was placed on 2-week Holter monitor. Stable throughout the stay. He was discharged home on 11/24/2020. Blood sugar was stable throughout the stay. Since coming home, patient feels well. No further episodes of syncope. Will be following up with cardiology later this week. Pain from carpal tunnel surgery continues to improve, is wearing a splint on R hand until 12/9/2020.          Patient Active Problem List    Diagnosis Date Noted    Essential hypertension, benign 07/12/2011     Priority: High    CAD (coronary artery disease) 07/12/2011     Priority: High    Status post AAA (abdominal aortic aneurysm) repair 11/10/2014     Priority: Medium    Chronic systolic heart failure ([address]      Priority: Low    Syncope and collapse 11/22/2020    Sebaceous cyst     Vestibular dizziness involving left inner ear 08/10/2017    Hyperlipidemia 01/27/2017    Arthritis of right knee 09/15/2015    Obesity (BMI 30.0-34.9) 02/27/2015    Cholelithiasis 11/10/2014    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 2/3/2014    Cholelithiasis 11/10/2014    Chronic systolic heart failure ([facility]     Diabetes mellitus type II     Essential hypertension     Hyperlipidemia     Obesity (BMI 30.0-34.9) 2/27/2015    Prolonged emergence from general anesthesia     slow to wake up    Spinal stenosis of lumbar region 1/29/2014    Status post AAA (abdominal aortic aneurysm) repair 11/10/2014       Current Outpatient Medications   Medication Sig Dispense Refill    clopidogrel (PLAVIX) 75 MG tablet TAKE 1 TABLET EVERY DAY (NEED MD APPOINTMENT FOR MORE REFILLS) 90 tablet 3    lisinopril (PRINIVIL;ZESTRIL) 20 MG tablet TAKE 1 TABLET BY MOUTH EVERY DAY 90 tablet 3    metFORMIN (GLUCOPHAGE) 1000 MG tablet Take 1 tablet by mouth 2 times daily (with meals) 180 tablet 1    tamsulosin (FLOMAX) 0.4 MG capsule TAKE 1 CAPSULE EVERY DAY 90 capsule 1    TRUE METRIX BLOOD GLUCOSE TEST strip TEST DAILY AS NEEDED. 100 strip 5    metoprolol succinate (TOPROL XL) 25 MG extended release tablet TAKE 1 TABLET EVERY NIGHT 90 tablet 3    atorvastatin (LIPITOR) 80 MG tablet TAKE 1 TABLET EVERY DAY 90 tablet 3    nitroGLYCERIN (NITROSTAT) 0.4 MG SL tablet Place 1 tablet under the tongue every 5 minutes as needed for Chest pain 25 tablet 3    DULoxetine (CYMBALTA) 30 MG extended release capsule       gabapentin (NEURONTIN) 300 MG capsule       TRUEPLUS LANCETS 33G MISC CHECK BLOOD SUGAR 4 TIMES PER DAY AS NEEDED (Patient taking differently: CHECK BLOOD SUGAR 1 TIME PER DAY AS NEEDED) 400 each 5    amLODIPine (NORVASC) 10 MG tablet Take 1 tablet by mouth daily (Patient taking differently: Take 10 mg by mouth nightly ) 30 tablet 5    SITagliptin (JANUVIA) 25 MG tablet Take 1 tablet by mouth daily 30 tablet 2    Blood Glucose Monitoring Suppl (TRUE METRIX AIR GLUCOSE METER) NATASHA Check blood sugar as needed 1 Device 0    aspirin 81 MG tablet Take 81 mg by mouth daily      ibuprofen (ADVIL) 200 MG CAPS Take 2 capsules by mouth 3 times daily       No current facility-administered medications for this visit. Allergies   Allergen Reactions    Latex Swelling and Rash     Blisters. Pt reacts to a variety of products, including balloons, rubber bands, gloves, foam pillows, adhesive tape, ACE bandages.  Iodine      Contrast,       Review of Systems   No fever, no cough, no vomiting    Vitals:  /88   Pulse 88   Temp 96.8 °F (36 °C)   Wt 234 lb (106.1 kg)   SpO2 98%   BMI 31.74 kg/m²     Physical Exam   General:  Well-appearing, NAD, alert, non-toxic  HEENT:  Normocephalic, atraumatic. Pupils equal and round. CHEST/LUNGS: CTAB, no crackles, no wheeze, no rhonchi. Symmetric rise  CARDIOVASCULAR: RRR,  no murmur, no rub  EXTREMETIES: Normal movement of all extremities. +splint on R wrist/ hand. SKIN:  No rash, no cellulitis, no bruising, no petechiae/purpura/vesicles/pustules/abscess  PSYCH:  A+O x 3; normal affect  NEURO:  GCS 15, CN2-12 grossly intact, no focal motor/sensory deficits, no cerebellar deficits, normal gait, normal speech      Assessment/Plan     77yo male here for hospital follow-up. Is doing well. No further episodes of sycope. Continue holter monitor and f/u with cardiology as scheduled.  HTN is stable, CPM. CAD stable, CPM. DM2 is stable, A1c is 6.3, CPM.     F/u in 6 months or as needed. Discharged in stable condition at 10:04    1. Hypertension, essential      2. Chronic systolic heart failure ([facility]      3. 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 Syncope, unspecified syncope type      5. Coronary artery disease involving coronary bypass graft of native heart, angina presence unspecified    - clopidogrel (PLAVIX) 75 MG tablet; TAKE 1 TABLET EVERY DAY (NEED MD APPOINTMENT FOR MORE REFILLS)  Dispense: 90 tablet; Refill: 3       No orders of the defined types were placed in this encounter. No follow-ups on file.     Kat West MD    11/30/2020  9:59 AM

## 2020-12-14 ENCOUNTER — TELEPHONE (OUTPATIENT)
Dept: FAMILY MEDICINE CLINIC | Age: 80
End: 2020-12-14

## 2020-12-14 PROCEDURE — 0298T PR EXT ECG > 48HR TO 21 DAY REVIEW AND INTERPRETATN: CPT | Performed by: INTERNAL MEDICINE

## 2020-12-14 NOTE — TELEPHONE ENCOUNTER
Patient saw Dr. Elidia Stewart on November 30 for a hospital f/u. He just realized he is scheduled for a Medicare Annual Wellness tomorrow and wants to know if he should keep that appointment.

## 2020-12-14 NOTE — TELEPHONE ENCOUNTER
Pt is having carpal tunnel surgery 12/18/20. Rosalba Torres would like his latest H&P faxed to [phone]. Rosalba would also like to know if the hospital follow visit can be used if need be.     Please call back and advise

## 2020-12-14 NOTE — TELEPHONE ENCOUNTER
Pt is being seen by cardiology currently, so I can clear him for the procedure pending clearance from the cardiologist. Branch to fax the latest progress note.

## 2020-12-15 ENCOUNTER — TELEPHONE (OUTPATIENT)
Dept: FAMILY MEDICINE CLINIC | Age: 80
End: 2020-12-15

## 2020-12-15 ENCOUNTER — TELEPHONE (OUTPATIENT)
Dept: CARDIOLOGY CLINIC | Age: 80
End: 2020-12-15

## 2020-12-15 NOTE — TELEPHONE ENCOUNTER
[facility] called, he is having surgery Friday for carpal tunnel. Did you do a preop?      Spoke to [place], [phone]

## 2020-12-15 NOTE — TELEPHONE ENCOUNTER
CARDIAC CLEARANCE     What type of procedure are you having? Left hand Carpal Tunnel Surgery    Which physician is performing your procedure? Dr Gildardo Charles    When is your procedure scheduled for? 12/18/2020    Where are you having this procedure? [facility] in [facility]    Are you taking Blood Thinners? If so what? (Name/dose/frequesncy)     Does the surgeon want you to stop your blood thinner? If so for how long?     Phone Number and Contact Name for Physicians office: [phone]    Fax number to send information: [phone] Attn Dr Gildardo Charles

## 2020-12-15 NOTE — TELEPHONE ENCOUNTER
Last time pt saw a cardiology was when he was admit in Emergency. Pt stated that he will call and make appointment with cardiology to be cleared for surgery. .. Pt is advised to call office if he has any question or concern.

## 2020-12-15 NOTE — TELEPHONE ENCOUNTER
I didn't exactly do a pre-op, but I can clear him for surgery as long as he is cleared by cardiology. If they want a dedicated pre-op, I can see him later this week.

## 2020-12-16 ENCOUNTER — TELEPHONE (OUTPATIENT)
Dept: CARDIOLOGY CLINIC | Age: 80
End: 2020-12-16

## 2020-12-17 NOTE — TELEPHONE ENCOUNTER
Steffanie Swift is calling again and states that patient's surgery is tomorrow so clearance is needed asap. Please fax to [phone].

## 2020-12-17 NOTE — TELEPHONE ENCOUNTER
Please fax a letter to the fax number below saying that the patient is cleared for the procedure as long as he has been cleared by cardiology.

## 2020-12-17 NOTE — TELEPHONE ENCOUNTER
Pt called and was given the message from Dr Radha Brown. Pt wanted to be sure our office faxed the letter from Dr Radha Brown to [phone] [email or web address].

## 2020-12-17 NOTE — TELEPHONE ENCOUNTER
Please fax them a letter saying that he is cleared by me for the procedure as long as cardiology has cleared him too.

## 2020-12-17 NOTE — TELEPHONE ENCOUNTER
Milvia Moncada will fax a copy of cardiology clearance for surgery and would also like Dr. Huber Chandler clearance based on his office visit on 11/30/20.

## 2020-12-23 ENCOUNTER — TELEPHONE (OUTPATIENT)
Dept: CARDIOLOGY CLINIC | Age: 80
End: 2020-12-23

## 2020-12-23 NOTE — TELEPHONE ENCOUNTER
MMK reviewed heart monitor and would ayaan EP to review. MXA saw patient in hospital for syncopal episode. Thought it could be due to dehydration and pain medication. Ordered 2 week monitor. Monitor shows several episodes of AT and some NSVT.

## 2020-12-28 NOTE — PROGRESS NOTES
· Gastrointestinal: negative for - abdominal pain, diarrhea, N/V  · Hematology: negative for - bleeding, blood clots, bruising or jaundice  · Genito-Urinary:  negative for - Dysuria or incontinence  · Musculoskeletal: negative for - Joint swelling, muscle pain  · Neurological: negative for - confusion, dizziness, headaches   · Psychiatric: No anxiety, no depression. · Dermatological: negative for - rash    Physical Examination:  Vitals:    20 1512   BP: (!) 149/90   Pulse: 84   SpO2: 97%        · Constitutional: Oriented. No distress. · Head: Normocephalic and atraumatic. · Mouth/Throat: Oropharynx is clear and moist.   · Eyes: Conjunctivae normal. EOM are normal.   · Neck: Neck supple. No rigidity. No JVD present. · Cardiovascular: Normal rate, regular rhythm, S1&S2. · Pulmonary/Chest: Bilateral respiratory sounds. No wheezes, No rhonchi. · Abdominal: Soft. Bowel sounds present. No distension, No tenderness. · Musculoskeletal: No tenderness. No edema    · Lymphadenopathy: Has no cervical adenopathy. · Neurological: Alert and oriented. Cranial nerve appears intact, No Gross deficit   · Skin: Skin is warm and dry. No rash noted. · Psychiatric: Has a normal behavior     Labs, diagnostic and imaging results reviewed. 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 PAC 1%, PVC 4%. 17 episodes of atrial tachycardia with the longest being 13 beats @140 BPM. The fastest was 166 bpm. Also showed prolonged bigeminal PAC's. AVG HR was 71 bpm, Min HR 44 bpm, MAX . No symptoms were reported. Echo:  2020  Summary   -Normal left ventricle size and systolic function with an estimated ejection   fraction of 60% range.   -There is mild concentric left ventricular hypertrophy.   -No regional wall motion abnormalities are noted. -Normal diastolic function E/e' = 6.0.   -The left atrium is mildly dilated. -Aortic valve appears sclerotic but opens adequately. -Mitral annular calcification is present.   -No evidence of significant aortic valve regurgitation.   -No evidence of significant mitral regurgitation.   -Mild tricuspid regurgitation. -IVC not well visualized    Cath: 06/08/2010  Triple vessel CAD involving the LAD,CX and RCA, Saphenous vein graft to the PDA is widely patent, Saphenous vein graft to the 1st OM is totally occluded. [place] to the LAD is widely patent.  Medical management    NM cardiac Stress test 06/23/2019  Summary    Small sized inferolateral fixed defect of mild intensity consistent with    infarction in the territory of the distal LCx and/or RCA .    Normal LV size and systolic function     Stress Interpretation    Appropriate hemodynamic response to lexiscan with no significant ST    changes.            Lab Results   Component Value Date    TSHREFLEX 0.61 11/23/2020    TSH 0.79 01/24/2014    TSH 2.08 01/23/2013    TSH 1.36 11/21/2011    CREATININE 1.2 11/24/2020    CREATININE 1.0 11/22/2020    AST 13 11/23/2020    ALT 11 11/23/2020         Medication:  Current Outpatient Medications   Medication Sig Dispense Refill    clopidogrel (PLAVIX) 75 MG tablet TAKE 1 TABLET EVERY DAY (NEED MD APPOINTMENT FOR MORE REFILLS) 90 tablet 3    lisinopril (PRINIVIL;ZESTRIL) 20 MG tablet TAKE 1 TABLET BY MOUTH EVERY DAY 90 tablet 3    metFORMIN (GLUCOPHAGE) 1000 MG tablet Take 1 tablet by mouth 2 times daily (with meals) 180 tablet 1    tamsulosin (FLOMAX) 0.4 MG capsule TAKE 1 CAPSULE EVERY DAY 90 capsule 1    TRUE METRIX BLOOD GLUCOSE TEST strip TEST DAILY AS NEEDED. 100 strip 5    metoprolol succinate (TOPROL XL) 25 MG extended release tablet TAKE 1 TABLET EVERY NIGHT 90 tablet 3    atorvastatin (LIPITOR) 80 MG tablet TAKE 1 TABLET EVERY DAY 90 tablet 3    nitroGLYCERIN (NITROSTAT) 0.4 MG SL tablet Place 1 tablet under the tongue every 5 minutes as needed for Chest pain 25 tablet 3  DULoxetine (CYMBALTA) 30 MG extended release capsule       gabapentin (NEURONTIN) 300 MG capsule Take 300 mg by mouth daily.  TRUEPLUS LANCETS 33G MISC CHECK BLOOD SUGAR 4 TIMES PER DAY AS NEEDED (Patient taking differently: CHECK BLOOD SUGAR 1 TIME PER DAY AS NEEDED) 400 each 5    amLODIPine (NORVASC) 10 MG tablet Take 1 tablet by mouth daily (Patient taking differently: Take 10 mg by mouth nightly ) 30 tablet 5    SITagliptin (JANUVIA) 25 MG tablet Take 1 tablet by mouth daily 30 tablet 2    Blood Glucose Monitoring Suppl (TRUE METRIX AIR GLUCOSE METER) NATASHA Check blood sugar as needed 1 Device 0    aspirin 81 MG tablet Take 81 mg by mouth daily      ibuprofen (ADVIL) 200 MG CAPS Take 2 capsules by mouth 3 times daily       No current facility-administered medications for this visit. Assessment and plan:     Syncope    14 day Holter Monitor 11/24/20 to 12/09/20 Predominant rhythm is Sinus Rhythm. PAC 1%, PVC 4%. 17 episodes of atrial tachycardia with the longest being 13 beats @140 BPM. The fastest was 166 bpm. Also showed prolonged bigeminal PAC's. AVG HR was 71 bpm, Min HR 44 bpm, MAX . No symptoms were reported. I think his syncope was due to medications and other factors and therefore no cardiac issues were concerned. No further action is necessary. Atrial tachycardia  He had a few short episodes of atrial tachycardia on the monitor but was not symptomatic. I would not treat this unless it becomes more frequent or symptoms. CAD  Status post CABG 2020  Last cath 06/2010- as noted above- medial management. Echo 11/2020- LVEF  60%  Plavix, metoprolol succinate,atorvastatin, Aspirin  Follows with Dr. Lopez Guerra.     HTN  Vitals:    12/30/20 1512   BP: (!) 149/90   Pulse: 84   SpO2: 97%   - elevated today- patient states it has been okay at home  -Home BP monitoring encourage with a BP goal <130/80  - Lisinopril, norvasc  - monitor sodium intake

## 2020-12-30 ENCOUNTER — OFFICE VISIT (OUTPATIENT)
Dept: CARDIOLOGY CLINIC | Age: 80
End: 2020-12-30
Payer: MEDICARE

## 2020-12-30 ENCOUNTER — TELEPHONE (OUTPATIENT)
Dept: CARDIOLOGY CLINIC | Age: 80
End: 2020-12-30

## 2020-12-30 VITALS
HEIGHT: 72 IN | WEIGHT: 233.2 LBS | OXYGEN SATURATION: 97 % | SYSTOLIC BLOOD PRESSURE: 149 MMHG | BODY MASS INDEX: 31.59 KG/M2 | HEART RATE: 84 BPM | DIASTOLIC BLOOD PRESSURE: 90 MMHG

## 2020-12-30 PROCEDURE — 93000 ELECTROCARDIOGRAM COMPLETE: CPT | Performed by: INTERNAL MEDICINE

## 2020-12-30 PROCEDURE — 99214 OFFICE O/P EST MOD 30 MIN: CPT | Performed by: INTERNAL MEDICINE

## 2020-12-30 NOTE — TELEPHONE ENCOUNTER
----- Message from Venecia Torres MD sent at 12/30/2020  7:11 AM EST -----  No cause for syncope on this study.  Let patient know

## 2021-01-28 NOTE — PROGRESS NOTES
[facility]   Cardiac Follow Up     Referring Provider:  Philip Gallardo MD     Chief Complaint   Patient presents with    Follow-up     2 month    Coronary Artery Disease    Hyperlipidemia        History of Present Illness:  Lakhwinder Sharpe is a 78 y.o. male with a history of CAD/ s/p CABG 02/ hypertension/ and hyperlipidemia AAA s/p repair '09 and carotid stenosis s/p LCE '06. ED 11/22/2020 with complaint of a syncopal episode. He had carpal tunnel surgery to his right hand the week prior. Patient states he woke up in the morning he went downstairs to make coffee. States he  made coffee was going to sit down in his chair when apparently he had a syncopal episode. Patient states he woke up on the ground, the chair was broken and he had a contusion to the back of his head. He just felt dizzy and then next found himself on the floor. He did not have any sweating or hot flashes. He was discharged home on 11/24/2020 with a 14 day holter monitor. Delmy Wang presents today in follow up. He has had no further syncopal episodes. The Holter monitor did not show any definite findings . He had carpal tunnel surgery in November prior to this. Denies MAGANA/PND, palpitations, light-headedness, edema. Past Medical History:   has a past medical history of Acquired spondylolisthesis, Arthritis, CAD (coronary artery disease), Carotid disease, bilateral ([address] Cholelithiasis, Chronic systolic heart failure ([address] Diabetes mellitus type II, Essential hypertension, Hyperlipidemia, Obesity (BMI 30.0-34.9), Prolonged emergence from general anesthesia, Spinal stenosis of lumbar region, and Status post AAA (abdominal aortic aneurysm) repair. Surgical History:   has a past surgical history that includes Umbilical hernia repair (1987); Hip Arthroplasty (Left, 1999); Carotid endarterectomy (Left, 2006); AAA repair, endovascular (2009); Coronary artery bypass graft (2002);  Vasectomy (1975); cyst removal (Right, 1997); Thyroid surgery (2012); Retinal detachment surgery (Left, 2004); Coronary angioplasty with stent (2009); Rotator cuff repair (Left, 2/24/14); Inguinal hernia repair (Left, 1985); Elbow surgery (Left, 1984); Total knee arthroplasty (Right, 09/15/2015); other surgical history (03/06/2018); Revision total hip arthroplasty (Left, 9/23/2019); Carpal tunnel release (Right, 11/20/2020); and Carpal tunnel release (Left, 12/18/2020). Social History:   reports that he quit smoking about 49 years ago. His smoking use included cigarettes. He has a 45.00 pack-year smoking history. He has never used smokeless tobacco. He reports that he does not drink alcohol or use drugs. Family History:  family history includes Breast Cancer in his sister; Cancer in his brother; Heart Disease in his brother, father, and sister. Home Medications:  Prior to Admission medications    Medication Sig Start Date End Date Taking?  Authorizing Provider   clopidogrel (PLAVIX) 75 MG tablet TAKE 1 TABLET EVERY DAY (NEED MD APPOINTMENT FOR MORE REFILLS) 11/30/20  Yes Earline Olszewski, MD   lisinopril (PRINIVIL;ZESTRIL) 20 MG tablet TAKE 1 TABLET BY MOUTH EVERY DAY 11/12/20  Yes Traci Kent MD   metFORMIN (GLUCOPHAGE) 1000 MG tablet Take 1 tablet by mouth 2 times daily (with meals) 8/17/20  Yes Earline Olszewski, MD   tamsulosin (FLOMAX) 0.4 MG capsule TAKE 1 CAPSULE EVERY DAY 5/26/20  Yes Earline Olszewski, MD   TRUE METRIX BLOOD GLUCOSE TEST strip TEST DAILY AS NEEDED. 4/15/20  Yes Earline Olszewski, MD   metoprolol succinate (TOPROL XL) 25 MG extended release tablet TAKE 1 TABLET EVERY NIGHT 3/25/20  Yes Traci Kent MD   atorvastatin (LIPITOR) 80 MG tablet TAKE 1 TABLET EVERY DAY 3/25/20  Yes Traci Kent MD   nitroGLYCERIN (NITROSTAT) 0.4 MG SL tablet Place 1 tablet under the tongue every 5 minutes as needed for Chest pain 3/9/20  Yes Traci Kent MD   DULoxetine (CYMBALTA) 30 MG extended release capsule  9/16/19  Yes Historical Provider, MD   gabapentin (NEURONTIN) 300 MG capsule Take 300 mg by mouth daily. 9/16/19  Yes Historical Provider, MD   TRUEPLUS LANCETS 33G MISC CHECK BLOOD SUGAR 4 TIMES PER DAY AS NEEDED  Patient taking differently: CHECK BLOOD SUGAR 1 TIME PER DAY AS NEEDED 7/31/18  Yes Ammon Bourgeois MD   amLODIPine (NORVASC) 10 MG tablet Take 1 tablet by mouth daily  Patient taking differently: Take 10 mg by mouth nightly  7/30/18  Yes Ammon Bourgeois MD   SITagliptin (JANUVIA) 25 MG tablet Take 1 tablet by mouth daily 7/9/18  Yes Ammon Bourgeois MD   Blood Glucose Monitoring Suppl (TRUE METRIX AIR GLUCOSE METER) NATASHA Check blood sugar as needed 5/24/18  Yes Ammon Bourgeois MD   aspirin 81 MG tablet Take 81 mg by mouth daily   Yes Historical Provider, MD   ibuprofen (ADVIL) 200 MG CAPS Take 2 capsules by mouth 3 times daily   Yes Historical Provider, MD        Allergies:  Latex and Iodine     Review of Systems:   · Constitutional: there has been no unanticipated weight loss. There's been no change in energy level, sleep pattern, or activity level. · Eyes: No visual changes or diplopia. No scleral icterus. · ENT: No Headaches, hearing loss or vertigo. No mouth sores or sore throat. · Cardiovascular: Reviewed in HPI  · Respiratory: No cough or wheezing, no sputum production. No hematemesis. · Gastrointestinal: No abdominal pain, appetite loss, blood in stools. No change in bowel or bladder habits. · Genitourinary: No dysuria, trouble voiding, or hematuria. · Musculoskeletal:  No gait disturbance, weakness or joint complaints. · Integumentary: No rash or pruritis. · Neurological: No headache, diplopia, change in muscle strength, numbness or tingling. No change in gait, balance, coordination, mood, affect, memory, mentation, behavior. · Psychiatric: No anxiety, no depression. · Endocrine: No malaise, fatigue or temperature intolerance. No excessive thirst, fluid intake, or urination.  No tremor. · Hematologic/Lymphatic: No abnormal bruising or bleeding, blood clots or swollen lymph nodes. · Allergic/Immunologic: No nasal congestion or hives. Physical Examination:    Vitals:    02/02/21 0955   BP: (!) 162/88   Pulse: 70   Resp: 22   SpO2: 96%        Wt Readings from Last 1 Encounters:   02/02/21 252 lb (114.3 kg)       Constitutional and General Appearance: NAD  Skin:good turgor,intact without lesions  HEENT: EOMI ,normal  Neck:no JVD    Respiratory:  · Normal excursion and expansion without use of accessory muscles  · Resp Auscultation: Normal breath sounds without dullness  Cardiovascular:  · The apical impulses not displaced  · Heart tones are crisp and normal  · Cervical veins are not engorged  · The carotid upstroke is normal in amplitude and contour without delay or bruit  · Peripheral pulses are symmetrical and full  · There is no clubbing, cyanosis of the extremities. · No edema  · Femoral Arteries: 2+ and equal  · Pedal Pulses: 2+ and equal   Abdomen:  · No masses or tenderness  · Liver/Spleen: No Abnormalities Noted  Neurological/Psychiatric:  · Alert and oriented in all spheres  · Moves all extremities well  · Exhibits normal gait balance and coordination  · No abnormalities of mood, affect, memory, mentation, or behavior are noted      Assessment:    1. Coronary artery disease  ~s/p CABG '02     Patient denies any anginal symptoms     Plan: continue current medications      2. Hypertension      Blood pressure (!) 162/88, pulse 70, resp. rate 22, height 6' (1.829 m), weight 252 lb (114.3 kg), SpO2 96 %. Elevated today- monitors at home -reports good readings      3. Hyperlipidemia      stable  -due for recheck       Continue Lipitor 80 mg daily         4. AAA--1/14/16  CT--mild aneurysmal dilation ascending aorta 4.8 cm similar to prior study. S/p aortic stent graft      Followed per PCP      5. Carotid disease-- stable - LCE '06 1/16   Right--16-49%  Left 1-15% --stable    6. Syncope- likely due to medications, no further episodes   14 day Holter Monitor 11/24/20 to 12/09/20 Predominant rhythm is Sinus Rhythm. PAC 1%, PVC 4%. 17 episodes of atrial tachycardia with the longest being 13 beats @140 BPM. The fastest was 166 bpm. Also showed prolonged bigeminal PAC's. AVG HR was 71 bpm, Min HR 44 bpm, MAX . No symptoms were reported. Plan:    Daniela Mcneil has a stable cardiac status. Lab results reviewed this visit and discussed. No med changes. Letter for disability placard given. Slip for Lipids given   Continue risk factor modifications. Call for any change in symptoms. Return for regular follow up in 6 months. I appreciate the opportunity of cooperating in the care of this individual.    Gila Trinh. Benjy Ramos M.D., [facility] [place]    Patient's problem list, medications, allergies, past medical, surgical, social and family histories were reviewed and updated as appropriate. Scribe's attestation: This note was scribed in the presence of Dr. Benjy Ramos MD, by Kenny Schuler RN. The scribe's documentation has been prepared under my direction and personally reviewed by me in its entirety. I confirm that the note above accurately reflects all work, treatment, procedures, and medical decision making performed by me.

## 2021-02-02 ENCOUNTER — OFFICE VISIT (OUTPATIENT)
Dept: CARDIOLOGY CLINIC | Age: 81
End: 2021-02-02
Payer: MEDICARE

## 2021-02-02 ENCOUNTER — HOSPITAL ENCOUNTER (OUTPATIENT)
Age: 81
Discharge: HOME | End: 2021-02-02
Payer: MEDICARE

## 2021-02-02 VITALS
RESPIRATION RATE: 22 BRPM | BODY MASS INDEX: 34.13 KG/M2 | DIASTOLIC BLOOD PRESSURE: 88 MMHG | HEIGHT: 72 IN | HEART RATE: 70 BPM | SYSTOLIC BLOOD PRESSURE: 162 MMHG | OXYGEN SATURATION: 96 % | WEIGHT: 252 LBS

## 2021-02-02 DIAGNOSIS — I10 ESSENTIAL HYPERTENSION, BENIGN: ICD-10-CM

## 2021-02-02 DIAGNOSIS — I25.110 CORONARY ARTERY DISEASE INVOLVING NATIVE CORONARY ARTERY OF NATIVE HEART WITH UNSTABLE ANGINA PECTORIS (HCC): ICD-10-CM

## 2021-02-02 DIAGNOSIS — I10 ESSENTIAL HYPERTENSION, BENIGN: Primary | ICD-10-CM

## 2021-02-02 DIAGNOSIS — R55 SYNCOPE, UNSPECIFIED SYNCOPE TYPE: ICD-10-CM

## 2021-02-02 DIAGNOSIS — I65.23 BILATERAL CAROTID ARTERY STENOSIS: ICD-10-CM

## 2021-02-02 DIAGNOSIS — E78.2 MIXED HYPERLIPIDEMIA: ICD-10-CM

## 2021-02-02 LAB
CHOLESTEROL, TOTAL: 147 MG/DL (ref 0–199)
HDLC SERPL-MCNC: 41 MG/DL (ref 40–60)
LDL CHOLESTEROL CALCULATED: 88 MG/DL
TRIGL SERPL-MCNC: 91 MG/DL (ref 0–150)
VLDLC SERPL CALC-MCNC: 18 MG/DL

## 2021-02-02 PROCEDURE — G8417 CALC BMI ABV UP PARAM F/U: HCPCS | Performed by: INTERNAL MEDICINE

## 2021-02-02 PROCEDURE — 4040F PNEUMOC VAC/ADMIN/RCVD: CPT | Performed by: INTERNAL MEDICINE

## 2021-02-02 PROCEDURE — 1036F TOBACCO NON-USER: CPT | Performed by: INTERNAL MEDICINE

## 2021-02-02 PROCEDURE — 1123F ACP DISCUSS/DSCN MKR DOCD: CPT | Performed by: INTERNAL MEDICINE

## 2021-02-02 PROCEDURE — 36415 COLL VENOUS BLD VENIPUNCTURE: CPT

## 2021-02-02 PROCEDURE — G8484 FLU IMMUNIZE NO ADMIN: HCPCS | Performed by: INTERNAL MEDICINE

## 2021-02-02 PROCEDURE — 99214 OFFICE O/P EST MOD 30 MIN: CPT | Performed by: INTERNAL MEDICINE

## 2021-02-02 PROCEDURE — 80061 LIPID PANEL: CPT

## 2021-02-02 PROCEDURE — G8427 DOCREV CUR MEDS BY ELIG CLIN: HCPCS | Performed by: INTERNAL MEDICINE

## 2021-02-05 ENCOUNTER — TELEPHONE (OUTPATIENT)
Dept: CARDIOLOGY CLINIC | Age: 81
End: 2021-02-05

## 2021-02-05 NOTE — TELEPHONE ENCOUNTER
----- Message from Jordan Aguilar MD sent at 2/5/2021  7:51 AM EST -----  Let him know cholesterol labs are good

## 2021-02-15 DIAGNOSIS — E11.59 TYPE 2 DIABETES MELLITUS WITH VASCULAR DISEASE (HCC): ICD-10-CM

## 2021-02-15 NOTE — TELEPHONE ENCOUNTER
Medication and Quantity requested: metFORMIN (GLUCOPHAGE) 1000 MG tablet  #180  Refills 2     Last Visit  11/30/20    Pharmacy and phone number updated in [facility]:  Yes, Zhane

## 2021-02-15 NOTE — TELEPHONE ENCOUNTER
lov 11/30/20  lrf 180 1 8/17/20  Lab Results   Component Value Date    LABA1C 6.3 11/23/2020     Lab Results   Component Value Date    .1 11/23/2020

## 2021-03-22 DIAGNOSIS — I25.810 CORONARY ARTERY DISEASE INVOLVING CORONARY BYPASS GRAFT OF NATIVE HEART, ANGINA PRESENCE UNSPECIFIED: ICD-10-CM

## 2021-03-22 RX ORDER — CLOPIDOGREL BISULFATE 75 MG/1
TABLET ORAL
Qty: 90 TABLET | Refills: 3 | Status: SHIPPED | OUTPATIENT
Start: 2021-03-22 | End: 2021-12-22

## 2021-03-22 RX ORDER — ISOPROPYL ALCOHOL 0.75 G/1
SWAB TOPICAL
Qty: 100 EACH | Refills: 0 | Status: SHIPPED | OUTPATIENT
Start: 2021-03-22

## 2021-03-22 RX ORDER — GLUCOSAM/CHON-MSM1/C/MANG/BOSW 500-416.6
TABLET ORAL
Qty: 400 EACH | Refills: 5 | Status: SHIPPED | OUTPATIENT
Start: 2021-03-22

## 2021-03-22 RX ORDER — BLOOD-GLUCOSE METER
EACH MISCELLANEOUS
Qty: 1 DEVICE | Refills: 0 | Status: SHIPPED | OUTPATIENT
Start: 2021-03-22

## 2021-03-22 RX ORDER — TAMSULOSIN HYDROCHLORIDE 0.4 MG/1
CAPSULE ORAL
Qty: 90 CAPSULE | Refills: 1 | Status: SHIPPED | OUTPATIENT
Start: 2021-03-22 | End: 2021-08-02

## 2021-03-22 NOTE — TELEPHONE ENCOUNTER
Medication and Quantity requested:     clopidogrel (PLAVIX) 75 MG tablet   And  tamsulosin (FLOMAX) 0.4 MG capsule   And  Accu-Chek Evelyne Plus Meter         Last Visit  11/30/20    Pharmacy and phone number updated in [facility]:  Yes [employer]

## 2021-03-22 NOTE — TELEPHONE ENCOUNTER
Medication and Quantity requested:     Accu-Chek Evelyne Plus Meter    Accu-Chek Sofclix Lancets  90 day supply    BD Single Use Swab  90 day supply    Last Visit  11/30/20    Pharmacy and phone number updated in [facility]:  Yes, [employer]

## 2021-03-23 RX ORDER — BLOOD GLUCOSE CONTROL HIGH,LOW
EACH MISCELLANEOUS
Qty: 1 EACH | Refills: 1 | Status: SHIPPED | OUTPATIENT
Start: 2021-03-23

## 2021-03-23 NOTE — TELEPHONE ENCOUNTER
Medication and Quantity requested: accu chek richard solution     Last Visit  11.30.20    Pharmacy and phone number updated in [facility]:  yes

## 2021-04-26 ENCOUNTER — OFFICE VISIT (OUTPATIENT)
Dept: FAMILY MEDICINE CLINIC | Age: 81
End: 2021-04-26
Payer: MEDICARE

## 2021-04-26 VITALS
WEIGHT: 253 LBS | DIASTOLIC BLOOD PRESSURE: 82 MMHG | HEART RATE: 69 BPM | BODY MASS INDEX: 33.53 KG/M2 | SYSTOLIC BLOOD PRESSURE: 120 MMHG | OXYGEN SATURATION: 100 % | HEIGHT: 73 IN

## 2021-04-26 DIAGNOSIS — H91.90 HEARING LOSS, UNSPECIFIED HEARING LOSS TYPE, UNSPECIFIED LATERALITY: ICD-10-CM

## 2021-04-26 DIAGNOSIS — Z00.00 ROUTINE GENERAL MEDICAL EXAMINATION AT A HEALTH CARE FACILITY: ICD-10-CM

## 2021-04-26 DIAGNOSIS — E11.59 TYPE 2 DIABETES MELLITUS WITH VASCULAR DISEASE (HCC): Primary | ICD-10-CM

## 2021-04-26 LAB — HBA1C MFR BLD: 6.2 %

## 2021-04-26 PROCEDURE — G0438 PPPS, INITIAL VISIT: HCPCS | Performed by: FAMILY MEDICINE

## 2021-04-26 PROCEDURE — 83036 HEMOGLOBIN GLYCOSYLATED A1C: CPT | Performed by: FAMILY MEDICINE

## 2021-04-26 ASSESSMENT — PATIENT HEALTH QUESTIONNAIRE - PHQ9
SUM OF ALL RESPONSES TO PHQ QUESTIONS 1-9: 0
1. LITTLE INTEREST OR PLEASURE IN DOING THINGS: 0
SUM OF ALL RESPONSES TO PHQ9 QUESTIONS 1 & 2: 0
2. FEELING DOWN, DEPRESSED OR HOPELESS: 0

## 2021-04-26 NOTE — PROGRESS NOTES
Medicare Annual Wellness Visit  Name: Kenneth Kelly Date: 2021   MRN: [id number] Sex: Male   Age: 78 y.o. Ethnicity: Non-/Non    : 1940 Race: Jonathan Cash is here for Medicare AWV    Screenings for behavioral, psychosocial and functional/safety risks, and cognitive dysfunction are all negative except as indicated below. These results, as well as other patient data from the [address] form, are documented in Flowsheets linked to this Encounter. Allergies   Allergen Reactions    Latex Swelling and Rash     Blisters. Pt reacts to a variety of products, including balloons, rubber bands, gloves, foam pillows, adhesive tape, ACE bandages.  Iodine      Contrast,       Prior to Visit Medications    Medication Sig Taking? Authorizing Provider   Blood Glucose Calibration (ACCU-CHEK THOMAS) SOLN Use as needed for blood sugar checks Yes Luis Alberto Zelaya MD   clopidogrel (PLAVIX) 75 MG tablet TAKE 1 TABLET EVERY DAY (NEED MD APPOINTMENT FOR MORE REFILLS) Yes Luis Alberto Zelaya MD   tamsulosin (FLOMAX) 0.4 MG capsule TAKE 1 CAPSULE EVERY DAY Yes Luis Alberto Zelaya MD   TRUEplus Lancets 33G MISC CHECK BLOOD SUGAR 4 TIMES PER DAY AS NEEDED Yes Luis Alberto Zelaya MD   Blood Glucose Monitoring Suppl (TRUE METRIX AIR GLUCOSE METER) NATASHA Check blood sugar as needed Yes Luis Alberto Zelaya MD   Alcohol Swabs (B-D SINGLE USE SWABS REGULAR) PADS Use as directed Yes Luis Alberto Zelaya MD   metFORMIN (GLUCOPHAGE) 1000 MG tablet Take 1 tablet by mouth 2 times daily (with meals) Yes Luis Alberto Zelaya MD   lisinopril (PRINIVIL;ZESTRIL) 20 MG tablet TAKE 1 TABLET BY MOUTH EVERY DAY Yes Juanita Coates MD   TRUE METRIX BLOOD GLUCOSE TEST strip TEST DAILY AS NEEDED.  Yes Luis Alberto Zelaya MD   metoprolol succinate (TOPROL XL) 25 MG extended release tablet TAKE 1 TABLET EVERY NIGHT Yes Juanita Coates MD   atorvastatin (LIPITOR) 80 MG tablet TAKE 1 TABLET EVERY DAY Yes Juanita Coates MD nitroGLYCERIN (NITROSTAT) 0.4 MG SL tablet Place 1 tablet under the tongue every 5 minutes as needed for Chest pain Yes Prince Couch MD   DULoxetine (CYMBALTA) 30 MG extended release capsule  Yes Historical Provider, MD   gabapentin (NEURONTIN) 300 MG capsule Take 300 mg by mouth daily.   Yes Historical Provider, MD   amLODIPine (NORVASC) 10 MG tablet Take 1 tablet by mouth daily  Patient taking differently: Take 10 mg by mouth nightly  Yes Beka Macias MD   SITagliptin (JANUVIA) 25 MG tablet Take 1 tablet by mouth daily Yes Beka Macias MD   aspirin 81 MG tablet Take 81 mg by mouth daily Yes Historical Provider, MD   ibuprofen (ADVIL) 200 MG CAPS Take 2 capsules by mouth 3 times daily Yes Historical Provider, MD       Past Medical History:   Diagnosis Date    Acquired spondylolisthesis 1/29/2014    Arthritis     diffuse    CAD (coronary artery disease)     S/P PCI and CABG    Carotid disease, bilateral ([facility] 2/3/2014    Cholelithiasis 11/10/2014    Chronic systolic heart failure ([facility]     Diabetes mellitus type II     Essential hypertension     Hyperlipidemia     Obesity (BMI 30.0-34.9) 2/27/2015    Prolonged emergence from general anesthesia     slow to wake up    Spinal stenosis of lumbar region 1/29/2014    Status post AAA (abdominal aortic aneurysm) repair 11/10/2014       Past Surgical History:   Procedure Laterality Date    ABDOMINAL AORTIC ANEURYSM REPAIR, ENDOVASCULAR  2009 Zayyat; stent    CAROTID ENDARTERECTOMY Left 2006 Z[facility]at    CARPAL TUNNEL RELEASE Right 11/20/2020    CARPAL TUNNEL RELEASE Left 12/18/2020    CORONARY ANGIOPLASTY WITH STENT PLACEMENT  2009    CORONARY ARTERY BYPASS GRAFT  2002    Gage, x4    CYST REMOVAL Right 1997    wrist    ELBOW SURGERY Left 1984    laceration to tendons    HIP ARTHROPLASTY Left 1999    [facility]    INGUINAL HERNIA REPAIR Left 1985    OTHER SURGICAL HISTORY  03/06/2018    excision of sebacious cyst on back    RETINAL DETACHMENT SURGERY Left 2004    [facility]    REVISION TOTAL HIP ARTHROPLASTY Left 9/23/2019    LEFT TOTAL HIP ARTHROPLASTY REVISION POSTERIOR APPROACH performed by Linda Whittaker MD at [facility] Left 2/24/14    Shai Martin THYROID SURGERY  2012    biopsied at [facility] Right 35/22/2774    UMBILICAL HERNIA REPAIR  1987    VASECTOMY  1975       Family History   Problem Relation Age of Onset    Heart Disease Father     Heart Disease Sister     Heart Disease Brother     Cancer Brother         testicular    Breast Cancer Sister        CareTeam (Including outside providers/suppliers regularly involved in providing care):   Patient Care Team:  Ranae Epley, MD as PCP - General  Ranae Epley, MD as PCP - [facility]  Joshua Gu MD as Consulting Physician (Cardiology)  CAROL Amador - CNP as Nurse Practitioner (Nurse Practitioner)  Leopoldo Long, MD as Surgeon (General Surgery)    Wt Readings from Last 3 Encounters:   04/26/21 253 lb (114.8 kg)   02/02/21 252 lb (114.3 kg)   12/30/20 233 lb 3.2 oz (105.8 kg)     Vitals:    04/26/21 1054   BP: 120/82   Pulse: 69   SpO2: 100%   Weight: 253 lb (114.8 kg)   Height: 6' 1\" (1.854 m)     Body mass index is 33.38 kg/m². Based upon direct observation of the patient, evaluation of cognition reveals recent and remote memory intact.     General Appearance: alert and oriented to person, place and time, well developed and well- nourished, in no acute distress  Skin: warm and dry, no rash or erythema  Head: normocephalic and atraumatic  Eyes: pupils equal, round, and reactive to light, extraocular eye movements intact, conjunctivae normal  ENT: tympanic membrane, external ear and ear canal normal bilaterally, nose without deformity, nasal mucosa and turbinates normal without polyps  Neck: supple and non-tender without mass, no thyromegaly or thyroid nodules, no cervical lymphadenopathy  Pulmonary/Chest: clear to appointment with his/her dentist    Hearing/Vision:  No exam data present  Hearing/Vision  Do you or your family notice any trouble with your hearing that hasn't been managed with hearing aids?: (!) Yes  Do you have difficulty driving, watching TV, or doing any of your daily activities because of your eyesight?: (!) Yes  Have you had an eye exam within the past year?: Yes  Hearing/Vision Interventions:  · Hearing concerns:  patient declines any further evaluation/treatment for hearing issues      Personalized Preventive Plan   Current Health Maintenance Status  Immunization History   Administered Date(s) Administered    COVID-19, Moderna, PF, 100mcg/0.5mL 01/21/2021, 02/18/2021    Influenza Vaccine, unspecified formulation 10/12/2016    Influenza Virus Vaccine 10/10/2011, 10/08/2013, 10/06/2014    Influenza, High Dose (Fluzone 65 yrs and older) 11/02/2012, 09/08/2015, 10/08/2018    Influenza, Quadv, IM, PF (6 mo and older Fluzone, Flulaval, Fluarix, and 3 yrs and older Afluria) 09/24/2019    Influenza, Quadv, adjuvanted, 65 yrs +, IM, PF (Fluad) 10/30/2020    Pneumococcal Conjugate 13-valent ([id number]) 11/10/2014    Pneumococcal Polysaccharide ([id number]) 11/14/2011    Pneumococcal Vaccine 01/01/2011    Td, unspecified formulation 08/08/2014    Tdap (Boostrix, Adacel) 04/16/2018    Zoster Live (Zostavax) 08/08/2014        Health Maintenance   Topic Date Due    Shingles Vaccine (2 of 3) 10/03/2014    Annual Wellness Visit (AWV)  Never done    Potassium monitoring  11/24/2021    Creatinine monitoring  11/24/2021    Lipid screen  02/02/2022    DTaP/Tdap/Td vaccine (2 - Td) 04/16/2028    Flu vaccine  Completed    Pneumococcal 65+ years Vaccine  Completed    COVID-19 Vaccine  Completed    Hepatitis A vaccine  Aged Out    Hib vaccine  Aged Out    Meningococcal (ACWY) vaccine  Aged Out     Recommendations for [employer] Due: see orders and patient instructions/AVS.  .   Recommended screening schedule for the next 5-10 years is provided to the patient in written form: see Patient Veronica Wade was seen today for medicare awv.     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

## 2021-04-26 NOTE — PATIENT INSTRUCTIONS
Personalized Preventive Plan for Lacho Latif - 4/26/2021  Medicare offers a range of preventive health benefits. Some of the tests and screenings are paid in full while other may be subject to a deductible, co-insurance, and/or copay. Some of these benefits include a comprehensive review of your medical history including lifestyle, illnesses that may run in your family, and various assessments and screenings as appropriate. After reviewing your medical record and screening and assessments performed today your provider may have ordered immunizations, labs, imaging, and/or referrals for you. A list of these orders (if applicable) as well as your Preventive Care list are included within your After Visit Summary for your review. Other Preventive Recommendations:    · A preventive eye exam performed by an eye specialist is recommended every 1-2 years to screen for glaucoma; cataracts, macular degeneration, and other eye disorders. · A preventive dental visit is recommended every 6 months. · Try to get at least 150 minutes of exercise per week or 10,000 steps per day on a pedometer . · Order or download the FREE \"Exercise & Physical Activity: Your Everyday Guide\" from [employer]. Call 7-453.907.5924 or search [employer] online. · You need 5174-6946 mg of calcium and 6938-9019 IU of vitamin D per day. It is possible to meet your calcium requirement with diet alone, but a vitamin D supplement is usually necessary to meet this goal.  · When exposed to the sun, use a sunscreen that protects against both UVA and UVB radiation with an SPF of 30 or greater. Reapply every 2 to 3 hours or after sweating, drying off with a towel, or swimming. · Always wear a seat belt when traveling in a car. Always wear a helmet when riding a bicycle or motorcycle.

## 2021-06-18 NOTE — TELEPHONE ENCOUNTER
Received refill request for Lisinopril 20 mg from [facility].     Last OV: 2/2/21 LES    Last Labs: 2/2/21    Last Refill: 11/12/20 #90 with 3 refills    Next Appt: 8/17/21 LES

## 2021-06-19 RX ORDER — LISINOPRIL 20 MG/1
TABLET ORAL
Qty: 90 TABLET | Refills: 3 | Status: SHIPPED | OUTPATIENT
Start: 2021-06-19 | End: 2022-02-09

## 2021-08-02 RX ORDER — TAMSULOSIN HYDROCHLORIDE 0.4 MG/1
CAPSULE ORAL
Qty: 90 CAPSULE | Refills: 1 | Status: SHIPPED | OUTPATIENT
Start: 2021-08-02 | End: 2021-12-16

## 2021-08-17 ENCOUNTER — OFFICE VISIT (OUTPATIENT)
Dept: CARDIOLOGY CLINIC | Age: 81
End: 2021-08-17
Payer: MEDICARE

## 2021-08-17 VITALS
DIASTOLIC BLOOD PRESSURE: 74 MMHG | WEIGHT: 239 LBS | SYSTOLIC BLOOD PRESSURE: 140 MMHG | OXYGEN SATURATION: 95 % | BODY MASS INDEX: 31.68 KG/M2 | HEIGHT: 73 IN | HEART RATE: 80 BPM

## 2021-08-17 DIAGNOSIS — I25.110 CORONARY ARTERY DISEASE INVOLVING NATIVE CORONARY ARTERY OF NATIVE HEART WITH UNSTABLE ANGINA PECTORIS (HCC): Primary | ICD-10-CM

## 2021-08-17 DIAGNOSIS — I65.23 BILATERAL CAROTID ARTERY STENOSIS: ICD-10-CM

## 2021-08-17 DIAGNOSIS — I10 ESSENTIAL HYPERTENSION, BENIGN: ICD-10-CM

## 2021-08-17 DIAGNOSIS — I71.40 ABDOMINAL AORTIC ANEURYSM (AAA) WITHOUT RUPTURE: ICD-10-CM

## 2021-08-17 DIAGNOSIS — E78.2 MIXED HYPERLIPIDEMIA: ICD-10-CM

## 2021-08-17 PROCEDURE — 99214 OFFICE O/P EST MOD 30 MIN: CPT | Performed by: INTERNAL MEDICINE

## 2021-08-17 PROCEDURE — G8427 DOCREV CUR MEDS BY ELIG CLIN: HCPCS | Performed by: INTERNAL MEDICINE

## 2021-08-17 PROCEDURE — 4040F PNEUMOC VAC/ADMIN/RCVD: CPT | Performed by: INTERNAL MEDICINE

## 2021-08-17 PROCEDURE — 1123F ACP DISCUSS/DSCN MKR DOCD: CPT | Performed by: INTERNAL MEDICINE

## 2021-08-17 PROCEDURE — G8417 CALC BMI ABV UP PARAM F/U: HCPCS | Performed by: INTERNAL MEDICINE

## 2021-08-17 PROCEDURE — 1036F TOBACCO NON-USER: CPT | Performed by: INTERNAL MEDICINE

## 2021-08-18 ENCOUNTER — TELEPHONE (OUTPATIENT)
Dept: FAMILY MEDICINE CLINIC | Age: 81
End: 2021-08-18

## 2021-08-18 DIAGNOSIS — E11.59 TYPE 2 DIABETES MELLITUS WITH VASCULAR DISEASE (HCC): Primary | ICD-10-CM

## 2021-08-18 DIAGNOSIS — E11.59 TYPE 2 DIABETES MELLITUS WITH VASCULAR DISEASE (HCC): ICD-10-CM

## 2021-08-18 RX ORDER — CALCIUM CITRATE/VITAMIN D3 200MG-6.25
TABLET ORAL
Qty: 100 STRIP | Refills: 5 | Status: CANCELLED | OUTPATIENT
Start: 2021-08-18

## 2021-08-18 RX ORDER — CALCIUM CITRATE/VITAMIN D3 200MG-6.25
TABLET ORAL
Qty: 100 STRIP | Refills: 5 | Status: SHIPPED | OUTPATIENT
Start: 2021-08-18 | End: 2021-08-19

## 2021-08-18 NOTE — TELEPHONE ENCOUNTER
Patient's daughter calls in requesting a prescription refill of     TRUE METRIX BLOOD GLUCOSE TEST strip        To be filled by [facility]. Pharmacy updated in [place].

## 2021-08-19 RX ORDER — CALCIUM CITRATE/VITAMIN D3 200MG-6.25
TABLET ORAL
Qty: 100 STRIP | Refills: 5 | Status: SHIPPED | OUTPATIENT
Start: 2021-08-19 | End: 2022-10-17

## 2021-08-19 NOTE — TELEPHONE ENCOUNTER
Medication:   Requested Prescriptions     Pending Prescriptions Disp Refills    TRUE METRIX BLOOD GLUCOSE TEST strip [Pharmacy Med Name: TRUE METRIX SELF MONITORING BLOOD GLUCOSE STRIPS   Strip] 100 strip 5     Sig: TEST DAILY AS NEEDED.        Last Filled:  8/18/21 #100, 5 RF     Patient Phone Number: [phone] (home)     Last appt: 4/26/2021 AWV   Next appt: Visit date not found    Last Labs DM:   Lab Results   Component Value Date    LABA1C 6.2 04/26/2021

## 2021-11-16 ENCOUNTER — NURSE ONLY (OUTPATIENT)
Dept: FAMILY MEDICINE CLINIC | Age: 81
End: 2021-11-16
Payer: MEDICARE

## 2021-11-16 DIAGNOSIS — Z23 NEED FOR VACCINATION: Primary | ICD-10-CM

## 2021-11-16 PROCEDURE — G0008 ADMIN INFLUENZA VIRUS VAC: HCPCS | Performed by: FAMILY MEDICINE

## 2021-11-16 PROCEDURE — 90694 VACC AIIV4 NO PRSRV 0.5ML IM: CPT | Performed by: FAMILY MEDICINE

## 2021-12-16 RX ORDER — TAMSULOSIN HYDROCHLORIDE 0.4 MG/1
CAPSULE ORAL
Qty: 90 CAPSULE | Refills: 1 | Status: SHIPPED | OUTPATIENT
Start: 2021-12-16 | End: 2022-05-24

## 2021-12-16 NOTE — TELEPHONE ENCOUNTER
Medication:   Requested Prescriptions     Pending Prescriptions Disp Refills    tamsulosin (FLOMAX) 0.4 MG capsule [Pharmacy Med Name: TAMSULOSIN HYDROCHLORIDE 0.4 MG Capsule] 90 capsule 1     Sig: TAKE 1 CAPSULE EVERY DAY        Last Filled:      Patient Phone Number: [phone] (home)     Last appt: 4/26/2021   Next appt: Visit date not found    Last OARRS: No flowsheet data found.

## 2021-12-22 DIAGNOSIS — I25.810 CORONARY ARTERY DISEASE INVOLVING CORONARY BYPASS GRAFT OF NATIVE HEART: ICD-10-CM

## 2021-12-22 RX ORDER — CLOPIDOGREL BISULFATE 75 MG/1
TABLET ORAL
Qty: 90 TABLET | Refills: 3 | Status: SHIPPED | OUTPATIENT
Start: 2021-12-22 | End: 2022-10-17

## 2021-12-22 NOTE — TELEPHONE ENCOUNTER
Medication:   Requested Prescriptions     Pending Prescriptions Disp Refills    clopidogrel (PLAVIX) 75 MG tablet [Pharmacy Med Name: CLOPIDOGREL 75 MG Tablet] 90 tablet 3     Sig: TAKE 1 TABLET EVERY DAY (NEED MD APPOINTMENT FOR MORE REFILLS)        Last Filled:  3/22/2021, 90, 3    Patient Phone Number: [phone] (home)     Last appt: 4/26/2021   Next appt: Visit date not found    Last OARRS: No flowsheet data found.

## 2022-02-16 NOTE — PROGRESS NOTES
(2012); Retinal detachment surgery (Left, 2004); Coronary angioplasty with stent (2009); Rotator cuff repair (Left, 2/24/14); Inguinal hernia repair (Left, 1985); Elbow surgery (Left, 1984); Total knee arthroplasty (Right, 09/15/2015); other surgical history (03/06/2018); Revision total hip arthroplasty (Left, 9/23/2019); Carpal tunnel release (Right, 11/20/2020); and Carpal tunnel release (Left, 12/18/2020). Social History:   reports that he quit smoking about 50 years ago. His smoking use included cigarettes. He has a 45.00 pack-year smoking history. He has never used smokeless tobacco. He reports that he does not drink alcohol and does not use drugs. Family History:  family history includes Breast Cancer in his sister; Cancer in his brother; Heart Disease in his brother, father, and sister. Home Medications:  Prior to Admission medications    Medication Sig Start Date End Date Taking?  Authorizing Provider   hydroCHLOROthiazide (HYDRODIURIL) 25 MG tablet Take 25 mg by mouth daily   Yes Historical Provider, MD   lisinopril (PRINIVIL;ZESTRIL) 20 MG tablet TAKE 1 TABLET BY MOUTH EVERY DAY 2/9/22  Yes Andrei Mina MD   clopidogrel (PLAVIX) 75 MG tablet TAKE 1 TABLET EVERY DAY (NEED MD APPOINTMENT FOR MORE REFILLS) 12/22/21  Yes Mariel Dumont MD   tamsulosin (FLOMAX) 0.4 MG capsule TAKE 1 CAPSULE EVERY DAY 12/16/21  Yes Mariel Dumont MD   TRUE METRIX BLOOD GLUCOSE TEST strip TEST DAILY AS NEEDED. 8/19/21  Yes Mariel Dumont MD   Blood Glucose Calibration (ACCU-CHEK THOMAS) SOLN Use as needed for blood sugar checks 3/23/21  Yes Mariel Dumont MD   TRUEplus Lancets 33G MISC CHECK BLOOD SUGAR 4 TIMES PER DAY AS NEEDED 3/22/21  Yes Mariel Dumont MD   Blood Glucose Monitoring Suppl (TRUE METRIX AIR GLUCOSE METER) NATASHA Check blood sugar as needed 3/22/21  Yes Mariel Dumont MD   Alcohol Swabs (B-D SINGLE USE SWABS REGULAR) PADS Use as directed 3/22/21  Yes Mariel Dumont MD   metFORMIN (GLUCOPHAGE) 1000 MG tablet Take 1 tablet by mouth 2 times daily (with meals) 2/15/21  Yes Danette Rocha MD   nitroGLYCERIN (NITROSTAT) 0.4 MG SL tablet Place 1 tablet under the tongue every 5 minutes as needed for Chest pain 3/9/20  Yes Farzana Crystal MD   aspirin 81 MG tablet Take 81 mg by mouth daily   Yes Historical Provider, MD   ibuprofen (ADVIL) 200 MG CAPS Take 2 capsules by mouth 3 times daily   Yes Historical Provider, MD        Allergies:  Latex and Iodine     Review of Systems:   · Constitutional: there has been no unanticipated weight loss. There's been no change in energy level, sleep pattern, or activity level. · Eyes: No visual changes or diplopia. No scleral icterus. · ENT: No Headaches, hearing loss or vertigo. No mouth sores or sore throat. · Cardiovascular: Reviewed in HPI  · Respiratory: No cough or wheezing, no sputum production. No hematemesis. · Gastrointestinal: No abdominal pain, appetite loss, blood in stools. No change in bowel or bladder habits. · Genitourinary: No dysuria, trouble voiding, or hematuria. · Musculoskeletal:  No gait disturbance, weakness or joint complaints. · Integumentary: No rash or pruritis. · Neurological: No headache, diplopia, change in muscle strength, numbness or tingling. No change in gait, balance, coordination, mood, affect, memory, mentation, behavior. · Psychiatric: No anxiety, no depression. · Endocrine: No malaise, fatigue or temperature intolerance. No excessive thirst, fluid intake, or urination. No tremor. · Hematologic/Lymphatic: No abnormal bruising or bleeding, blood clots or swollen lymph nodes. · Allergic/Immunologic: No nasal congestion or hives.     Physical Examination:    Vitals:    02/18/22 0944   BP: (!) 150/92   Pulse:    SpO2:         Wt Readings from Last 1 Encounters:   02/18/22 242 lb 4.8 oz (109.9 kg)       Constitutional and General Appearance: NAD  Skin:good turgor,intact without lesions  HEENT: EOMI ,normal  Neck:no JVD    Respiratory:  · Normal excursion and expansion without use of accessory muscles  · Resp Auscultation: Normal breath sounds without dullness  Cardiovascular:  · The apical impulses not displaced  · Heart tones are crisp and normal  · Cervical veins are not engorged  · The carotid upstroke is normal in amplitude and contour without delay or bruit  · Peripheral pulses are symmetrical and full  · There is no clubbing, cyanosis of the extremities. · No edema  · Femoral Arteries: 2+ and equal  · Pedal Pulses: 2+ and equal   Abdomen:  · No masses or tenderness  · Liver/Spleen: No Abnormalities Noted  Neurological/Psychiatric:  · Alert and oriented in all spheres  · Moves all extremities well  · Exhibits normal gait balance and coordination  · No abnormalities of mood, affect, memory, mentation, or behavior are noted    Assessment:    1. Coronary artery disease  ~s/p CABG 2002  Stable, no anginal symptoms     2. Hypertension  Elevated today. 140/80 on recheck by me. Blood pressure (!) 150/92, pulse 87, height 6' (1.829 m), weight 242 lb 4.8 oz (109.9 kg), SpO2 98 %.     3. Hyperlipidemia  Stable on Lipitor 80 mg daily       4. AAA  S/p aortic stent graft 2009  Thoracic CT 1/14/16> Mild aneurysmal dilation ascending aorta 4.8 cm similar to prior study. Followed per PCP      5. Carotid disease  Lt CEA 2006  Stable  Dopplers 7339> ERI 52-48%, LICA 9-97%    6. Syncope, h/o (likely due to medications)  No further episodes   14 day Holter Monitor 11/24/20 to 12/09/20> Predominant rhythm is Sinus Rhythm. PAC 1%, PVC 4%. 17 episodes of atrial tachycardia with the longest being 13 beats @140 BPM. The fastest was 166 bpm. Also showed prolonged bigeminal PAC's. AVG HR was 71 bpm, Min HR 44 bpm, MAX . No symptoms were reported. Plan:    Flakito Humphrey has a stable cardiac status. Lab results reviewed this visit and discussed. 1. No med changes  2. Needs BMP, CBC, lipids, alt/ast  3.  Return for follow up in 6 months    I appreciate the opportunity of cooperating in the care of this individual.    Bobby Coulter. Zhao Mcneal M.D., [facility]    Patient's problem list, medications, allergies, past medical, surgical, social and family histories were reviewed and updated as appropriate. Scribe's attestation: This note was scribed in the presence of Dr. Zhao Mcneal MD, by Rebekah Lepe RN. The scribe's documentation has been prepared under my direction and personally reviewed by me in its entirety. I confirm that the note above accurately reflects all work, treatment, procedures, and medical decision making performed by me.

## 2022-02-18 ENCOUNTER — OFFICE VISIT (OUTPATIENT)
Dept: CARDIOLOGY CLINIC | Age: 82
End: 2022-02-18
Payer: MEDICARE

## 2022-02-18 VITALS
BODY MASS INDEX: 32.82 KG/M2 | DIASTOLIC BLOOD PRESSURE: 92 MMHG | SYSTOLIC BLOOD PRESSURE: 150 MMHG | OXYGEN SATURATION: 98 % | WEIGHT: 242.3 LBS | HEIGHT: 72 IN | HEART RATE: 87 BPM

## 2022-02-18 DIAGNOSIS — I25.10 CORONARY ARTERY DISEASE INVOLVING NATIVE CORONARY ARTERY OF NATIVE HEART WITHOUT ANGINA PECTORIS: ICD-10-CM

## 2022-02-18 DIAGNOSIS — I10 ESSENTIAL HYPERTENSION, BENIGN: Primary | ICD-10-CM

## 2022-02-18 DIAGNOSIS — E78.49 OTHER HYPERLIPIDEMIA: ICD-10-CM

## 2022-02-18 PROCEDURE — G8417 CALC BMI ABV UP PARAM F/U: HCPCS | Performed by: INTERNAL MEDICINE

## 2022-02-18 PROCEDURE — 4040F PNEUMOC VAC/ADMIN/RCVD: CPT | Performed by: INTERNAL MEDICINE

## 2022-02-18 PROCEDURE — 1036F TOBACCO NON-USER: CPT | Performed by: INTERNAL MEDICINE

## 2022-02-18 PROCEDURE — 1123F ACP DISCUSS/DSCN MKR DOCD: CPT | Performed by: INTERNAL MEDICINE

## 2022-02-18 PROCEDURE — 99214 OFFICE O/P EST MOD 30 MIN: CPT | Performed by: INTERNAL MEDICINE

## 2022-02-18 PROCEDURE — G8484 FLU IMMUNIZE NO ADMIN: HCPCS | Performed by: INTERNAL MEDICINE

## 2022-02-18 PROCEDURE — G8427 DOCREV CUR MEDS BY ELIG CLIN: HCPCS | Performed by: INTERNAL MEDICINE

## 2022-02-18 RX ORDER — HYDROCHLOROTHIAZIDE 25 MG/1
25 TABLET ORAL DAILY
COMMUNITY

## 2022-02-18 NOTE — Clinical Note
[address] [place] [address] [address] [id number]  Phone: [phone]  Fax: [phone]    Marcos Denson MD    February 18, 2022     Chantell Swartz, [address] [place] [address]    Patient: Lacho Latif   MR Number: [id number]   YOB: 1940   Date of Visit: 2/18/2022       Dear Chantell Swartz:    Thank you for referring Irwhitley Travis to me for evaluation/treatment. Below are the relevant portions of my assessment and plan of care. If you have questions, please do not hesitate to call me. I look forward to following Maddison Chiang along with you.     Sincerely,      Marcos Denson MD

## 2022-02-22 ENCOUNTER — TELEPHONE (OUTPATIENT)
Dept: CARDIOLOGY CLINIC | Age: 82
End: 2022-02-22

## 2022-02-22 ENCOUNTER — HOSPITAL ENCOUNTER (OUTPATIENT)
Age: 82
Discharge: HOME | End: 2022-02-22
Payer: MEDICARE

## 2022-02-22 DIAGNOSIS — E78.49 OTHER HYPERLIPIDEMIA: ICD-10-CM

## 2022-02-22 DIAGNOSIS — I10 ESSENTIAL HYPERTENSION, BENIGN: ICD-10-CM

## 2022-02-22 DIAGNOSIS — I25.10 CORONARY ARTERY DISEASE INVOLVING NATIVE CORONARY ARTERY OF NATIVE HEART WITHOUT ANGINA PECTORIS: ICD-10-CM

## 2022-02-22 LAB
ALT SERPL-CCNC: 11 U/L (ref 10–40)
ANION GAP SERPL CALCULATED.3IONS-SCNC: 12 MMOL/L (ref 3–16)
AST SERPL-CCNC: 13 U/L (ref 15–37)
BASOPHILS ABSOLUTE: 0 K/UL (ref 0–0.2)
BASOPHILS RELATIVE PERCENT: 0.2 %
BUN BLDV-MCNC: 21 MG/DL (ref 7–20)
CALCIUM SERPL-MCNC: 9.6 MG/DL (ref 8.3–10.6)
CHLORIDE BLD-SCNC: 101 MMOL/L (ref 99–110)
CHOLESTEROL, TOTAL: 259 MG/DL (ref 0–199)
CO2: 26 MMOL/L (ref 21–32)
CREAT SERPL-MCNC: 1.2 MG/DL (ref 0.8–1.3)
EOSINOPHILS ABSOLUTE: 0 K/UL (ref 0–0.6)
EOSINOPHILS RELATIVE PERCENT: 0 %
GFR AFRICAN AMERICAN: >60
GFR NON-AFRICAN AMERICAN: 58
GLUCOSE BLD-MCNC: 168 MG/DL (ref 70–99)
HCT VFR BLD CALC: 45.9 % (ref 40.5–52.5)
HDLC SERPL-MCNC: 45 MG/DL (ref 40–60)
HEMOGLOBIN: 15.4 G/DL (ref 13.5–17.5)
LDL CHOLESTEROL CALCULATED: 195 MG/DL
LYMPHOCYTES ABSOLUTE: 0.7 K/UL (ref 1–5.1)
LYMPHOCYTES RELATIVE PERCENT: 9 %
MCH RBC QN AUTO: 32.3 PG (ref 26–34)
MCHC RBC AUTO-ENTMCNC: 33.5 G/DL (ref 31–36)
MCV RBC AUTO: 96.7 FL (ref 80–100)
MONOCYTES ABSOLUTE: 0.3 K/UL (ref 0–1.3)
MONOCYTES RELATIVE PERCENT: 4.2 %
NEUTROPHILS ABSOLUTE: 7 K/UL (ref 1.7–7.7)
NEUTROPHILS RELATIVE PERCENT: 86.6 %
PDW BLD-RTO: 14.6 % (ref 12.4–15.4)
PLATELET # BLD: 201 K/UL (ref 135–450)
PMV BLD AUTO: 9.1 FL (ref 5–10.5)
POTASSIUM SERPL-SCNC: 4.9 MMOL/L (ref 3.5–5.1)
RBC # BLD: 4.75 M/UL (ref 4.2–5.9)
SODIUM BLD-SCNC: 139 MMOL/L (ref 136–145)
TRIGL SERPL-MCNC: 94 MG/DL (ref 0–150)
VLDLC SERPL CALC-MCNC: 19 MG/DL
WBC # BLD: 8 K/UL (ref 4–11)

## 2022-02-22 PROCEDURE — 36415 COLL VENOUS BLD VENIPUNCTURE: CPT

## 2022-02-22 PROCEDURE — 80061 LIPID PANEL: CPT

## 2022-02-22 PROCEDURE — 80048 BASIC METABOLIC PNL TOTAL CA: CPT

## 2022-02-22 PROCEDURE — 84450 TRANSFERASE (AST) (SGOT): CPT

## 2022-02-22 PROCEDURE — 85025 COMPLETE CBC W/AUTO DIFF WBC: CPT

## 2022-02-22 PROCEDURE — 84460 ALANINE AMINO (ALT) (SGPT): CPT

## 2022-02-22 NOTE — TELEPHONE ENCOUNTER
Called pt regarding labs. He states he has been eating a lot of Baloney sandwiches and Stoffer's pot pies and Jesus's meals. Educated about lean meats such as fish, chicken, lean serloin, ground turkey and ground chicken with veggies and fruits. Asked him to look at the label in the grocery store and substitute for less sodium and less fat than what his is currently eating. He is agreeable to a statin and to changing his diet. He will read labels.

## 2022-03-28 RX ORDER — LISINOPRIL 20 MG/1
TABLET ORAL
Qty: 90 TABLET | Refills: 3 | Status: SHIPPED | OUTPATIENT
Start: 2022-03-28

## 2022-05-27 RX ORDER — TAMSULOSIN HYDROCHLORIDE 0.4 MG/1
CAPSULE ORAL
Qty: 30 CAPSULE | Refills: 0 | Status: SHIPPED | OUTPATIENT
Start: 2022-05-27 | End: 2022-07-11

## 2022-06-01 DIAGNOSIS — E11.59 TYPE 2 DIABETES MELLITUS WITH VASCULAR DISEASE (HCC): ICD-10-CM

## 2022-06-01 NOTE — TELEPHONE ENCOUNTER
Medication and Quantity requested: metFORMIN (GLUCOPHAGE) 1000 MG tablet          Last Visit  4/26/2021    Pharmacy and phone number updated in EPIC:  Yes    Veenajesgatarahel Rowley

## 2022-06-01 NOTE — TELEPHONE ENCOUNTER
lov 4/26/21  lrf 180 1 2/15/21  Lab Results   Component Value Date    LABA1C 6.2 04/26/2021     Lab Results   Component Value Date    .1 11/23/2020

## 2022-06-03 ENCOUNTER — OFFICE VISIT (OUTPATIENT)
Dept: FAMILY MEDICINE CLINIC | Age: 82
End: 2022-06-03
Payer: MEDICARE

## 2022-06-03 VITALS
BODY MASS INDEX: 31.7 KG/M2 | DIASTOLIC BLOOD PRESSURE: 90 MMHG | OXYGEN SATURATION: 97 % | SYSTOLIC BLOOD PRESSURE: 130 MMHG | HEIGHT: 73 IN | WEIGHT: 239.2 LBS | HEART RATE: 80 BPM

## 2022-06-03 DIAGNOSIS — E11.59 TYPE 2 DIABETES MELLITUS WITH VASCULAR DISEASE (HCC): ICD-10-CM

## 2022-06-03 DIAGNOSIS — R42 VERTIGO: ICD-10-CM

## 2022-06-03 DIAGNOSIS — Z00.00 MEDICARE ANNUAL WELLNESS VISIT, SUBSEQUENT: Primary | ICD-10-CM

## 2022-06-03 DIAGNOSIS — D49.2 GROWTH OF EAR CANAL: ICD-10-CM

## 2022-06-03 PROCEDURE — 1123F ACP DISCUSS/DSCN MKR DOCD: CPT | Performed by: NURSE PRACTITIONER

## 2022-06-03 PROCEDURE — G0439 PPPS, SUBSEQ VISIT: HCPCS | Performed by: NURSE PRACTITIONER

## 2022-06-03 RX ORDER — FLUTICASONE PROPIONATE 50 MCG
SPRAY, SUSPENSION (ML) NASAL
Qty: 16 G | Refills: 5 | OUTPATIENT
Start: 2022-06-03

## 2022-06-03 RX ORDER — FLUTICASONE PROPIONATE 50 MCG
2 SPRAY, SUSPENSION (ML) NASAL DAILY
Qty: 16 G | Refills: 5 | Status: SHIPPED | OUTPATIENT
Start: 2022-06-03 | End: 2022-08-19

## 2022-06-03 RX ORDER — MECLIZINE HYDROCHLORIDE 25 MG/1
25 TABLET ORAL 3 TIMES DAILY PRN
Qty: 30 TABLET | Refills: 0 | Status: SHIPPED | OUTPATIENT
Start: 2022-06-03 | End: 2022-06-13

## 2022-06-03 SDOH — ECONOMIC STABILITY: FOOD INSECURITY: WITHIN THE PAST 12 MONTHS, THE FOOD YOU BOUGHT JUST DIDN'T LAST AND YOU DIDN'T HAVE MONEY TO GET MORE.: NEVER TRUE

## 2022-06-03 SDOH — ECONOMIC STABILITY: FOOD INSECURITY: WITHIN THE PAST 12 MONTHS, YOU WORRIED THAT YOUR FOOD WOULD RUN OUT BEFORE YOU GOT MONEY TO BUY MORE.: NEVER TRUE

## 2022-06-03 ASSESSMENT — PATIENT HEALTH QUESTIONNAIRE - PHQ9
SUM OF ALL RESPONSES TO PHQ9 QUESTIONS 1 & 2: 0
1. LITTLE INTEREST OR PLEASURE IN DOING THINGS: 0
SUM OF ALL RESPONSES TO PHQ QUESTIONS 1-9: 0
SUM OF ALL RESPONSES TO PHQ9 QUESTIONS 1 & 2: 0
2. FEELING DOWN, DEPRESSED OR HOPELESS: 0
SUM OF ALL RESPONSES TO PHQ QUESTIONS 1-9: 0
2. FEELING DOWN, DEPRESSED OR HOPELESS: 0
SUM OF ALL RESPONSES TO PHQ QUESTIONS 1-9: 0
SUM OF ALL RESPONSES TO PHQ QUESTIONS 1-9: 0
1. LITTLE INTEREST OR PLEASURE IN DOING THINGS: 0
SUM OF ALL RESPONSES TO PHQ QUESTIONS 1-9: 0

## 2022-06-03 ASSESSMENT — SOCIAL DETERMINANTS OF HEALTH (SDOH): HOW HARD IS IT FOR YOU TO PAY FOR THE VERY BASICS LIKE FOOD, HOUSING, MEDICAL CARE, AND HEATING?: SOMEWHAT HARD

## 2022-06-03 NOTE — PROGRESS NOTES
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 ([address]  -     metFORMIN (GLUCOPHAGE) 1000 MG tablet; Take 1 tablet by mouth 2 times daily (with meals), Disp-180 tablet, R-0Normal  Refill given, will follow up with me in one month for diabetes follow up  Vertigo  -     meclizine (ANTIVERT) 25 MG tablet; Take 1 tablet by mouth 3 times daily as needed for Dizziness, Disp-30 tablet, R-0Normal  -     fluticasone (FLONASE) 50 MCG/ACT nasal spray; 2 sprays by Each Nostril route daily, Disp-16 g, R-5Normal  Encouraged eppley maneuvers PRN  Growth of ear canal  -     Daniel Gonzalez MD, Otolaryngology, [facility]  Etiology ? ? Recommendations for Preventive Services Due: see orders and patient instructions/AVS.  Recommended screening schedule for the next 5-10 years is provided to the patient in written form: see Patient Instructions/AVS.     Return in 4 weeks (on 7/1/2022) for diabetes follow up. Subjective   The following acute and/or chronic problems were also addressed today:  Left ear bleeding: Onset two days ago. Worse today. Denies pain. Dizziness:  Has had intermittent dizziness for several years. Worse when turning head to left or when laying on his left side. Denies shortness of breath, nausea, vomiting, chest pain. Patient's complete Health Risk Assessment and screening values have been reviewed and are found in Flowsheets. The following problems were reviewed today and where indicated follow up appointments were made and/or referrals ordered. Positive Risk Factor Screenings with Interventions:     Cognitive:   Words recalled: 2 Words Recalled  Clock Drawing Test (CDT): (!) Abnormal  Total Score Interpretation: Abnormal Mini-Cog    Cognitive Impairment Interventions:  · Patient declines any further evaluation/treatment for cognitive impairment            Health Habits/Nutrition:     Physical Activity:     Days of Exercise per Week: Not on file    Minutes of Exercise per Session: Not on file        Body mass index: (!) 31.56       Health Habits/Nutrition Interventions:  · Inadequate physical activity:  he is busy all day long. still works as a              Objective   Vitals:    06/03/22 1254   BP: (!) 130/90   Pulse: 80   SpO2: 97%   Weight: 239 lb 3.2 oz (108.5 kg)   Height: 6' 1\" (1.854 m)      Body mass index is 31.56 kg/m². General Appearance: alert and oriented to person, place and time, well developed and well- nourished, in no acute distress  Skin: warm and dry, no rash or erythema  Head: normocephalic and atraumatic  Eyes: pupils equal, round, and reactive to light, extraocular eye movements intact, conjunctivae normal  ENT: tympanic membrane, external ear normal bilaterally, there is a small growth in the left ear on the posterior wall (1cm) lesion is bleeding. nose without deformity, nasal mucosa and turbinates normal without polyps  Neck: supple and non-tender without mass, no thyromegaly or thyroid nodules, no cervical lymphadenopathy  Pulmonary/Chest: clear to auscultation bilaterally- no wheezes, rales or rhonchi, normal air movement, no respiratory distress  Cardiovascular: normal rate, regular rhythm, normal S1 and S2, no murmurs, rubs, clicks, or gallops, distal pulses intact, no carotid bruits  Abdomen: soft, non-tender, non-distended, normal bowel sounds, no masses or organomegaly  Extremities: no cyanosis, clubbing or edema  Musculoskeletal: normal range of motion, no joint swelling, deformity or tenderness  Neurologic: reflexes normal and symmetric, no cranial nerve deficit, gait, coordination and speech normal       Allergies   Allergen Reactions    Latex Swelling and Rash     Blisters.   Pt reacts to a variety of products, including balloons, rubber bands, gloves, foam pillows, adhesive tape, ACE bandages.  Iodine      Contrast,     Prior to Visit Medications    Medication Sig Taking? Authorizing Provider   meclizine (ANTIVERT) 25 MG tablet Take 1 tablet by mouth 3 times daily as needed for Dizziness Yes CAROL Torre CNP   fluticasone (FLONASE) 50 MCG/ACT nasal spray 2 sprays by Each Nostril route daily Yes CAROL Torre CNP   metFORMIN (GLUCOPHAGE) 1000 MG tablet Take 1 tablet by mouth 2 times daily (with meals) Yes CAROL Torre CNP   tamsulosin (FLOMAX) 0.4 mg capsule TAKE 1 Sharlot Fabián Yes Racquel Limon MD   lisinopril (PRINIVIL;ZESTRIL) 20 MG tablet TAKE 1 TABLET EVERY DAY Yes Dawn Lindquist MD   hydroCHLOROthiazide (HYDRODIURIL) 25 MG tablet Take 25 mg by mouth daily Yes Historical Provider, MD   clopidogrel (PLAVIX) 75 MG tablet TAKE 1 TABLET EVERY DAY (NEED MD APPOINTMENT FOR MORE REFILLS) Yes Roney Landeros MD   TRUE METRIX BLOOD GLUCOSE TEST strip TEST DAILY AS NEEDED.   Roney Landeros MD   Blood Glucose Calibration (ACCU-CHEK THOMAS) SOLN Use as needed for blood sugar checks  Roney Landeros MD   TRUEplus Lancets 33G MISC CHECK BLOOD SUGAR 4 TIMES PER DAY AS NEEDED  Roney Landeros MD   Blood Glucose Monitoring Suppl (TRUE METRIX AIR GLUCOSE METER) NATASHA Check blood sugar as needed  Roney Landeros MD   Alcohol Swabs (B-D SINGLE USE SWABS REGULAR) PADS Use as directed  Roney Landeros MD   nitroGLYCERIN (NITROSTAT) 0.4 MG SL tablet Place 1 tablet under the tongue every 5 minutes as needed for Chest pain  Dawn Lindquist MD   aspirin 81 MG tablet Take 81 mg by mouth daily  Historical Provider, MD   ibuprofen (ADVIL) 200 MG CAPS Take 2 capsules by mouth 3 times daily  Historical Provider, MD [place] (Including outside providers/suppliers regularly involved in providing care):   Patient Care Team:  Roney Landeros MD as PCP - General  Roney Landeros MD as PCP - [facility] Empaneled Provider  Dawn Lindquist MD as Consulting Physician (Cardiology)  CAROL Desir CNP as Nurse Practitioner (Nurse Practitioner)  Yoshi Green MD as Surgeon (General Surgery)     Reviewed and updated this visit:  Tobacco  Allergies  Meds  Med Hx  Surg Hx  Soc Hx  Fam Hx

## 2022-06-03 NOTE — TELEPHONE ENCOUNTER
Medication:   Requested Prescriptions     Pending Prescriptions Disp Refills    fluticasone (FLONASE) 50 MCG/ACT nasal spray [Pharmacy Med Name: FLUTICASONE 50MCG NASAL SP (120) RX] 16 g 5     Sig: SHAKE LIQUID AND USE 2 SPRAYS IN EACH NOSTRIL DAILY        Last Filled:      Patient Phone Number: [phone] (home)     Last appt: 6/3/2022   Next appt: 6/3/2022    Last OARRS: No flowsheet data found.

## 2022-06-03 NOTE — TELEPHONE ENCOUNTER
Medication:   Requested Prescriptions     Pending Prescriptions Disp Refills    metFORMIN (GLUCOPHAGE) 1000 MG tablet 180 tablet 0     Sig: Take 1 tablet by mouth 2 times daily (with meals)       Last Filled:  6/3/2022, 180, 0    Patient Phone Number: [phone] (home)     Last appt: 6/3/2022   Next appt: 7/8/2022    Last Labs DM:   Lab Results   Component Value Date    LABA1C 6.2 04/26/2021

## 2022-06-03 NOTE — PATIENT INSTRUCTIONS
[address] [facility] [address], [address] [facility] [address]   [place], [address]   [phone]     Personalized Preventive Plan for Anthony De La Cruz - 6/3/2022  Medicare offers a range of preventive health benefits. Some of the tests and screenings are paid in full while other may be subject to a deductible, co-insurance, and/or copay. Some of these benefits include a comprehensive review of your medical history including lifestyle, illnesses that may run in your family, and various assessments and screenings as appropriate. After reviewing your medical record and screening and assessments performed today your provider may have ordered immunizations, labs, imaging, and/or referrals for you. A list of these orders (if applicable) as well as your Preventive Care list are included within your After Visit Summary for your review. Other Preventive Recommendations:    · A preventive eye exam performed by an eye specialist is recommended every 1-2 years to screen for glaucoma; cataracts, macular degeneration, and other eye disorders. · A preventive dental visit is recommended every 6 months. · Try to get at least 150 minutes of exercise per week or 10,000 steps per day on a pedometer . · Order or download the FREE \"Exercise & Physical Activity: Your Everyday Guide\" from [employer]. Call 0-848.330.4975 or search [employer] online. · You need 9312-2997 mg of calcium and 1925-2698 IU of vitamin D per day. It is possible to meet your calcium requirement with diet alone, but a vitamin D supplement is usually necessary to meet this goal.  · When exposed to the sun, use a sunscreen that protects against both UVA and UVB radiation with an SPF of 30 or greater. Reapply every 2 to 3 hours or after sweating, drying off with a towel, or swimming. · Always wear a seat belt when traveling in a car. Always wear a helmet when riding a bicycle or motorcycle.

## 2022-06-03 NOTE — TELEPHONE ENCOUNTER
Medication and Quantity requested: metformin 1,000 mg tablets     Last Visit  06/03/2022    Pharmacy and phone number updated in EPIC:  Yes       [employer]

## 2022-06-04 DIAGNOSIS — E11.59 TYPE 2 DIABETES MELLITUS WITH VASCULAR DISEASE (HCC): ICD-10-CM

## 2022-06-06 NOTE — TELEPHONE ENCOUNTER
lov 6/3/22  lrf 180 0 6/3/22   Lab Results   Component Value Date     02/22/2022    K 4.9 02/22/2022     02/22/2022    CO2 26 02/22/2022    BUN 21 (H) 02/22/2022    CREATININE 1.2 02/22/2022    GLUCOSE 168 (H) 02/22/2022    CALCIUM 9.6 02/22/2022    PROT 6.8 11/23/2020    LABALBU 3.8 11/23/2020    BILITOT 0.8 11/23/2020    ALKPHOS 93 11/23/2020    AST 13 (L) 02/22/2022    ALT 11 02/22/2022    LABGLOM 58 (A) 02/22/2022    GFRAA >60 02/22/2022    AGRATIO 1.4 11/22/2020    GLOB 3.1 11/22/2020

## 2022-06-20 ENCOUNTER — OFFICE VISIT (OUTPATIENT)
Dept: ENT CLINIC | Age: 82
End: 2022-06-20
Payer: MEDICARE

## 2022-06-20 VITALS
DIASTOLIC BLOOD PRESSURE: 101 MMHG | SYSTOLIC BLOOD PRESSURE: 164 MMHG | WEIGHT: 239 LBS | HEIGHT: 73 IN | RESPIRATION RATE: 16 BRPM | HEART RATE: 75 BPM | BODY MASS INDEX: 31.68 KG/M2

## 2022-06-20 DIAGNOSIS — H93.8X2 SENSATION OF FULLNESS IN LEFT EAR: ICD-10-CM

## 2022-06-20 DIAGNOSIS — H92.23 BLOOD IN BOTH EAR CANALS: Primary | ICD-10-CM

## 2022-06-20 PROCEDURE — 99203 OFFICE O/P NEW LOW 30 MIN: CPT | Performed by: STUDENT IN AN ORGANIZED HEALTH CARE EDUCATION/TRAINING PROGRAM

## 2022-06-20 PROCEDURE — 1123F ACP DISCUSS/DSCN MKR DOCD: CPT | Performed by: STUDENT IN AN ORGANIZED HEALTH CARE EDUCATION/TRAINING PROGRAM

## 2022-06-20 PROCEDURE — G8417 CALC BMI ABV UP PARAM F/U: HCPCS | Performed by: STUDENT IN AN ORGANIZED HEALTH CARE EDUCATION/TRAINING PROGRAM

## 2022-06-20 PROCEDURE — G8427 DOCREV CUR MEDS BY ELIG CLIN: HCPCS | Performed by: STUDENT IN AN ORGANIZED HEALTH CARE EDUCATION/TRAINING PROGRAM

## 2022-06-20 PROCEDURE — 1036F TOBACCO NON-USER: CPT | Performed by: STUDENT IN AN ORGANIZED HEALTH CARE EDUCATION/TRAINING PROGRAM

## 2022-06-20 NOTE — PROGRESS NOTES
[address] (:  1940) is a 78 y.o. male, here for evaluation of the following chief complaint(s):  Ear Fullness (right and left ear bleeding.)      ASSESSMENT/PLAN:  1. Blood in both ear canals  2. Sensation of fullness in left ear      This is a very pleasant 78 y.o. male here today for evaluation of the the above-noted complaints. On exam, the patient has evidence of dried blood in the right external auditory canal.  I suspect that this is from traumatic Q-tip usage. On the left side, I see no evidence of underlying mass, cerumen or blood. It is unclear what his nurse practitioner saw, however may have been some dried blood that is since fallen out. And I discussed avoidance of Q-tip usage. If his ears get really dry he can try something such as mineral oil or sweet oil. Medical Decision Making: The following items were considered in medical decision making:  Independent review of images  Review / order clinical lab tests  Review / order radiology tests  Decision to obtain old records  [facility] and [facility]    SUBJECTIVE/OBJECTIVE:  [facility]    Loki Cordero is here today for evaluation of issues related to his ears. Patient states that he uses Q-tips regularly. He wears hearing aids. He has known sensorineural hearing loss. He has been getting some bleeding from both ear canals. It started on the left and then progressed to the right. He recently got a large dried blood clot out of his right external auditory canal.  He was seen by his primary care physician and told that he had something in his right ear.           REVIEW OF SYSTEMS  The following systems were reviewed and revealed the following in addition to any already discussed in the HPI:    PHYSICAL EXAM    GENERAL: No acute distress, alert and oriented, no hoarseness, strong voice  EYES: EOMI, Anti-icteric  HENT:   Head: Normocephalic and atraumatic. Face:  Symmetric, facial nerve intact  Right Ear: Normal external ear, normal external auditory canal, intact tympanic membrane with normal mobility and aerated middle ear there is some dried blood in the external auditory canal.  Left Ear: Normal external ear, normal external auditory canal, intact tympanic membrane with normal mobility and aerated middle ear  Mouth/Oral Cavity:  normal lips, Uvula is midline, no mucosal lesions, no trismus, normal dentition, normal salivary quality/flow  Oropharynx/Larynx:  normal oropharynx, normal tonsils  Nose:Normal external nasal appearance. PROCEDURE      This note was generated completely or in part utilizing Dragon dictation speech recognition software. Occasionally, words are mistranscribed and despite editing, the text may contain inaccuracies due to incorrect word recognition. If further clarification is needed please contact the office at [phone]. An electronic signature was used to authenticate this note.     --Guillermo Robins MD

## 2022-07-08 ENCOUNTER — OFFICE VISIT (OUTPATIENT)
Dept: FAMILY MEDICINE CLINIC | Age: 82
End: 2022-07-08
Payer: MEDICARE

## 2022-07-08 VITALS
BODY MASS INDEX: 29.82 KG/M2 | DIASTOLIC BLOOD PRESSURE: 85 MMHG | HEIGHT: 73 IN | WEIGHT: 225 LBS | HEART RATE: 76 BPM | SYSTOLIC BLOOD PRESSURE: 150 MMHG | OXYGEN SATURATION: 96 %

## 2022-07-08 DIAGNOSIS — E11.59 TYPE 2 DIABETES MELLITUS WITH VASCULAR DISEASE (HCC): Primary | ICD-10-CM

## 2022-07-08 DIAGNOSIS — I50.22 CHRONIC SYSTOLIC HEART FAILURE (HCC): ICD-10-CM

## 2022-07-08 DIAGNOSIS — I10 HYPERTENSION, ESSENTIAL: ICD-10-CM

## 2022-07-08 DIAGNOSIS — N18.31 STAGE 3A CHRONIC KIDNEY DISEASE (HCC): ICD-10-CM

## 2022-07-08 DIAGNOSIS — I71.40 ABDOMINAL AORTIC ANEURYSM (AAA) WITHOUT RUPTURE: ICD-10-CM

## 2022-07-08 PROBLEM — I47.1 ATRIAL TACHYCARDIA (HCC): Status: ACTIVE | Noted: 2022-07-08

## 2022-07-08 PROBLEM — I47.19 ATRIAL TACHYCARDIA: Status: RESOLVED | Noted: 2022-07-08 | Resolved: 2022-07-08

## 2022-07-08 PROBLEM — I47.1 ATRIAL TACHYCARDIA (HCC): Status: RESOLVED | Noted: 2022-07-08 | Resolved: 2022-07-08

## 2022-07-08 PROBLEM — N18.30 CHRONIC RENAL DISEASE, STAGE III (HCC): Status: ACTIVE | Noted: 2022-07-08

## 2022-07-08 PROBLEM — I47.19 ATRIAL TACHYCARDIA: Status: ACTIVE | Noted: 2022-07-08

## 2022-07-08 LAB — HBA1C MFR BLD: 6 %

## 2022-07-08 PROCEDURE — 99214 OFFICE O/P EST MOD 30 MIN: CPT | Performed by: FAMILY MEDICINE

## 2022-07-08 PROCEDURE — 1036F TOBACCO NON-USER: CPT | Performed by: FAMILY MEDICINE

## 2022-07-08 PROCEDURE — 3044F HG A1C LEVEL LT 7.0%: CPT | Performed by: FAMILY MEDICINE

## 2022-07-08 PROCEDURE — G8417 CALC BMI ABV UP PARAM F/U: HCPCS | Performed by: FAMILY MEDICINE

## 2022-07-08 PROCEDURE — 83036 HEMOGLOBIN GLYCOSYLATED A1C: CPT | Performed by: FAMILY MEDICINE

## 2022-07-08 PROCEDURE — G8427 DOCREV CUR MEDS BY ELIG CLIN: HCPCS | Performed by: FAMILY MEDICINE

## 2022-07-08 PROCEDURE — 1123F ACP DISCUSS/DSCN MKR DOCD: CPT | Performed by: FAMILY MEDICINE

## 2022-07-08 NOTE — PROGRESS NOTES
[address] Note    Date: 7/8/2022                                               Nando Machado:     Chief Complaint   Patient presents with    Diabetes    Hypertension       HPI   Patient is here for diabetic checkup. Currently takes metformin only. Denies polyuria polydipsia. Denies vision change. Patient has hypertension. Currently takes lisinopril and HCTZ. Denies chest pain or shortness of breath. Also has history of CAD status post coronary artery bypass graft. Says takes Plavix and aspirin. Sees cardiology twice a year. Pt has hx of AAA repair in 2009. Follows with vascular surgery.          Patient Active Problem List    Diagnosis Date Noted    Essential hypertension, benign 07/12/2011    CAD (coronary artery disease) 07/12/2011    Chronic renal disease, stage III [facility]) [207356] 07/08/2022    Status post AAA (abdominal aortic aneurysm) repair 11/10/2014    Chronic systolic heart failure ([address]     Syncope and collapse 11/22/2020    Sebaceous cyst     Vestibular dizziness involving left inner ear 08/10/2017    Hyperlipidemia 01/27/2017    Arthritis of right knee 09/15/2015    Obesity (BMI 30.0-34.9) 02/27/2015    Cholelithiasis 11/10/2014    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 ([address] 08/08/2014    Thoracic and lumbosacral neuritis 01/29/2014    Spinal stenosis of lumbar region 01/29/2014    Acquired spondylolisthesis 01/29/2014    Old MI (myocardial infarction) 07/12/2011    Abdominal aortic aneurysm (AAA) without rupture ([address] 07/12/2011       Past Medical History:   Diagnosis Date    Acquired spondylolisthesis 1/29/2014    Arthritis     diffuse    CAD (coronary artery disease)     S/P PCI and CABG    Carotid disease, bilateral ([address] 2/3/2014    Cholelithiasis 11/10/2014    Chronic systolic heart failure ([address]     Diabetes mellitus type II     Essential hypertension     Hyperlipidemia     Obesity (BMI 30.0-34.9) 2/27/2015    Prolonged emergence from general anesthesia     slow to wake up    Spinal stenosis of lumbar region 1/29/2014    Status post AAA (abdominal aortic aneurysm) repair 11/10/2014       Current Outpatient Medications   Medication Sig Dispense Refill    metFORMIN (GLUCOPHAGE) 1000 MG tablet Take 1 tablet by mouth nightly 90 tablet 1    fluticasone (FLONASE) 50 MCG/ACT nasal spray 2 sprays by Each Nostril route daily 16 g 5    tamsulosin (FLOMAX) 0.4 mg capsule TAKE 1 CAPSULE EVERY DAY 30 capsule 0    lisinopril (PRINIVIL;ZESTRIL) 20 MG tablet TAKE 1 TABLET EVERY DAY 90 tablet 3    hydroCHLOROthiazide (HYDRODIURIL) 25 MG tablet Take 25 mg by mouth daily      clopidogrel (PLAVIX) 75 MG tablet TAKE 1 TABLET EVERY DAY (NEED MD APPOINTMENT FOR MORE REFILLS) 90 tablet 3    TRUE METRIX BLOOD GLUCOSE TEST strip TEST DAILY AS NEEDED. 100 strip 5    Blood Glucose Calibration (ACCU-CHEK THOMAS) SOLN Use as needed for blood sugar checks 1 each 1    TRUEplus Lancets 33G MISC CHECK BLOOD SUGAR 4 TIMES PER DAY AS NEEDED 400 each 5    Blood Glucose Monitoring Suppl (TRUE METRIX AIR GLUCOSE METER) NATASHA Check blood sugar as needed 1 Device 0    Alcohol Swabs (B-D SINGLE USE SWABS REGULAR) PADS Use as directed 100 each 0    nitroGLYCERIN (NITROSTAT) 0.4 MG SL tablet Place 1 tablet under the tongue every 5 minutes as needed for Chest pain 25 tablet 3    aspirin 81 MG tablet Take 81 mg by mouth daily      ibuprofen (ADVIL) 200 MG CAPS Take 2 capsules by mouth 3 times daily       No current facility-administered medications for this visit. Allergies   Allergen Reactions    Latex Swelling and Rash     Blisters. Pt reacts to a variety of products, including balloons, rubber bands, gloves, foam pillows, adhesive tape, ACE bandages.     Iodine      Contrast,       Review of Systems   No fever, no cough    Vitals:  BP (!) 150/85   Pulse 76   Ht 6' 1\" (1.854 m)   Wt 225 lb (102.1 kg)   SpO2 96%   BMI 29.69 kg/m²     Wt Readings from Last 3 Encounters:   07/08/22 225 lb (102.1 kg)   06/20/22 239 lb (108.4 kg)   06/03/22 239 lb 3.2 oz (108.5 kg)        Physical Exam   General:  Well-appearing, NAD, alert, non-toxic  HEENT:  Normocephalic, atraumatic. Pupils equal and round. CHEST/LUNGS: CTAB, no crackles, no wheeze, no rhonchi. Symmetric rise  CARDIOVASCULAR: RRR,  no murmur, no rub  EXTREMETIES: Normal movement of all extremities  SKIN:  No rash, no cellulitis, no bruising, no petechiae/purpura/vesicles/pustules/abscess  PSYCH:  A+O x 3; normal affect  NEURO:  GCS 15, CN2-12 grossly intact, no focal motor/sensory deficits, no cerebellar deficits, normal gait, normal speech      Assessment/Plan     78year-old male with type 2 diabetes. A1c is at goal.  We will decrease his metformin to 1000 mg once a day. Continue healthy diet and exercise. Patient has history of hypertension and heart failure and coronary artery disease. Blood pressure is somewhat above goal.  Has been normotensive recently. We will continue current medicines and have him follow-up in 2 weeks for blood pressure check. Follow-up with cardiology for his heart failure. Follow-up with vascular surgery for his AAA repair. Discharged in stable condition. 1. 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  -     POCT glycosylated hemoglobin (Hb A1C)  -     metFORMIN (GLUCOPHAGE) 1000 MG tablet; Take 1 tablet by mouth nightly, Disp-90 tablet, R-1Print  2. Chronic systolic heart failure (HCC)  3. Stage 3a chronic kidney disease (HCC)  4. Abdominal aortic aneurysm (AAA) without rupture ([facility]  5. Hypertension, essential       Orders Placed This Encounter   Procedures    POCT glycosylated hemoglobin (Hb A1C)       No follow-ups on file.     Kvng Lees MD, MD    7/8/2022  2:21 PM

## 2022-07-11 RX ORDER — TAMSULOSIN HYDROCHLORIDE 0.4 MG/1
CAPSULE ORAL
Qty: 90 CAPSULE | Refills: 1 | Status: SHIPPED | OUTPATIENT
Start: 2022-07-11

## 2022-07-11 NOTE — TELEPHONE ENCOUNTER
Medication:   Requested Prescriptions     Pending Prescriptions Disp Refills    tamsulosin (FLOMAX) 0.4 MG capsule [Pharmacy Med Name: TAMSULOSIN HYDROCHLORIDE 0.4 MG Capsule] 60 capsule 5     Sig: TAKE 1 CAPSULE EVERY DAY        Last Filled:  05/27/2022    Patient Phone Number: [phone] (home)     Last appt: 7/8/2022   Next appt: 7/22/2022    Last OARRS: No flowsheet data found.

## 2022-07-12 ENCOUNTER — APPOINTMENT (OUTPATIENT)
Dept: GENERAL RADIOLOGY | Age: 82
DRG: 247 | End: 2022-07-12
Payer: MEDICARE

## 2022-07-12 ENCOUNTER — HOSPITAL ENCOUNTER (INPATIENT)
Age: 82
LOS: 1 days | Discharge: HOME | DRG: 247 | End: 2022-07-14
Attending: EMERGENCY MEDICINE | Admitting: INTERNAL MEDICINE
Payer: MEDICARE

## 2022-07-12 DIAGNOSIS — I24.9 ACUTE CORONARY SYNDROME (HCC): Primary | ICD-10-CM

## 2022-07-12 PROCEDURE — 99285 EMERGENCY DEPT VISIT HI MDM: CPT

## 2022-07-12 PROCEDURE — 93005 ELECTROCARDIOGRAM TRACING: CPT | Performed by: EMERGENCY MEDICINE

## 2022-07-12 PROCEDURE — 85025 COMPLETE CBC W/AUTO DIFF WBC: CPT

## 2022-07-12 PROCEDURE — 80053 COMPREHEN METABOLIC PANEL: CPT

## 2022-07-12 PROCEDURE — 71045 X-RAY EXAM CHEST 1 VIEW: CPT

## 2022-07-12 PROCEDURE — 96375 TX/PRO/DX INJ NEW DRUG ADDON: CPT

## 2022-07-12 PROCEDURE — 96366 THER/PROPH/DIAG IV INF ADDON: CPT

## 2022-07-12 PROCEDURE — 96365 THER/PROPH/DIAG IV INF INIT: CPT

## 2022-07-12 PROCEDURE — 84484 ASSAY OF TROPONIN QUANT: CPT

## 2022-07-12 PROCEDURE — 85730 THROMBOPLASTIN TIME PARTIAL: CPT

## 2022-07-12 PROCEDURE — 83880 ASSAY OF NATRIURETIC PEPTIDE: CPT

## 2022-07-12 RX ORDER — HEPARIN SODIUM 1000 [USP'U]/ML
4000 INJECTION, SOLUTION INTRAVENOUS; SUBCUTANEOUS ONCE
Status: COMPLETED | OUTPATIENT
Start: 2022-07-12 | End: 2022-07-13

## 2022-07-12 RX ORDER — HEPARIN SODIUM 1000 [USP'U]/ML
2000 INJECTION, SOLUTION INTRAVENOUS; SUBCUTANEOUS PRN
Status: DISCONTINUED | OUTPATIENT
Start: 2022-07-12 | End: 2022-07-13 | Stop reason: ALTCHOICE

## 2022-07-12 RX ORDER — ASPIRIN 81 MG/1
324 TABLET, CHEWABLE ORAL ONCE
Status: COMPLETED | OUTPATIENT
Start: 2022-07-12 | End: 2022-07-13

## 2022-07-12 RX ORDER — HEPARIN SODIUM 10000 [USP'U]/100ML
5-30 INJECTION, SOLUTION INTRAVENOUS CONTINUOUS
Status: DISCONTINUED | OUTPATIENT
Start: 2022-07-12 | End: 2022-07-13

## 2022-07-12 RX ORDER — HEPARIN SODIUM 1000 [USP'U]/ML
4000 INJECTION, SOLUTION INTRAVENOUS; SUBCUTANEOUS PRN
Status: DISCONTINUED | OUTPATIENT
Start: 2022-07-12 | End: 2022-07-13 | Stop reason: ALTCHOICE

## 2022-07-12 ASSESSMENT — ENCOUNTER SYMPTOMS
COUGH: 0
RHINORRHEA: 0
SHORTNESS OF BREATH: 0
ABDOMINAL PAIN: 0
NAUSEA: 0
DIARRHEA: 0
VOMITING: 0

## 2022-07-12 ASSESSMENT — PAIN DESCRIPTION - PAIN TYPE: TYPE: ACUTE PAIN

## 2022-07-12 ASSESSMENT — PAIN SCALES - GENERAL: PAINLEVEL_OUTOF10: 9

## 2022-07-12 ASSESSMENT — PAIN - FUNCTIONAL ASSESSMENT: PAIN_FUNCTIONAL_ASSESSMENT: 0-10

## 2022-07-12 ASSESSMENT — PAIN DESCRIPTION - LOCATION: LOCATION: CHEST

## 2022-07-13 DIAGNOSIS — I25.83 CORONARY ARTERY DISEASE DUE TO LIPID RICH PLAQUE: Primary | ICD-10-CM

## 2022-07-13 DIAGNOSIS — I25.10 CORONARY ARTERY DISEASE DUE TO LIPID RICH PLAQUE: Primary | ICD-10-CM

## 2022-07-13 PROBLEM — I24.9 ACS (ACUTE CORONARY SYNDROME) (HCC): Status: ACTIVE | Noted: 2022-07-13

## 2022-07-13 LAB
A/G RATIO: 1.2 (ref 1.1–2.2)
ALBUMIN SERPL-MCNC: 4 G/DL (ref 3.4–5)
ALP BLD-CCNC: 98 U/L (ref 40–129)
ALT SERPL-CCNC: 15 U/L (ref 10–40)
ANION GAP SERPL CALCULATED.3IONS-SCNC: 10 MMOL/L (ref 3–16)
ANION GAP SERPL CALCULATED.3IONS-SCNC: 7 MMOL/L (ref 3–16)
APTT: 27 SEC (ref 23–34.3)
APTT: 69.5 SEC (ref 23–34.3)
AST SERPL-CCNC: 21 U/L (ref 15–37)
BASOPHILS ABSOLUTE: 0 K/UL (ref 0–0.2)
BASOPHILS RELATIVE PERCENT: 0.6 %
BILIRUB SERPL-MCNC: 0.5 MG/DL (ref 0–1)
BUN BLDV-MCNC: 14 MG/DL (ref 7–20)
BUN BLDV-MCNC: 16 MG/DL (ref 7–20)
CALCIUM SERPL-MCNC: 9.1 MG/DL (ref 8.3–10.6)
CALCIUM SERPL-MCNC: 9.4 MG/DL (ref 8.3–10.6)
CHLORIDE BLD-SCNC: 106 MMOL/L (ref 99–110)
CHLORIDE BLD-SCNC: 106 MMOL/L (ref 99–110)
CO2: 24 MMOL/L (ref 21–32)
CO2: 26 MMOL/L (ref 21–32)
CREAT SERPL-MCNC: 0.9 MG/DL (ref 0.8–1.3)
CREAT SERPL-MCNC: 1 MG/DL (ref 0.8–1.3)
EKG ATRIAL RATE: 112 BPM
EKG ATRIAL RATE: 89 BPM
EKG DIAGNOSIS: NORMAL
EKG DIAGNOSIS: NORMAL
EKG P AXIS: 51 DEGREES
EKG P AXIS: 6 DEGREES
EKG P-R INTERVAL: 200 MS
EKG P-R INTERVAL: 306 MS
EKG Q-T INTERVAL: 348 MS
EKG Q-T INTERVAL: 392 MS
EKG QRS DURATION: 136 MS
EKG QRS DURATION: 142 MS
EKG QTC CALCULATION (BAZETT): 475 MS
EKG QTC CALCULATION (BAZETT): 476 MS
EKG R AXIS: -50 DEGREES
EKG R AXIS: 260 DEGREES
EKG T AXIS: -6 DEGREES
EKG T AXIS: 29 DEGREES
EKG VENTRICULAR RATE: 112 BPM
EKG VENTRICULAR RATE: 89 BPM
EOSINOPHILS ABSOLUTE: 0 K/UL (ref 0–0.6)
EOSINOPHILS RELATIVE PERCENT: 0.6 %
ESTIMATED AVERAGE GLUCOSE: 122.6 MG/DL
GFR AFRICAN AMERICAN: >60
GFR AFRICAN AMERICAN: >60
GFR NON-AFRICAN AMERICAN: >60
GFR NON-AFRICAN AMERICAN: >60
GLUCOSE BLD-MCNC: 100 MG/DL (ref 70–99)
GLUCOSE BLD-MCNC: 105 MG/DL (ref 70–99)
GLUCOSE BLD-MCNC: 108 MG/DL (ref 70–99)
GLUCOSE BLD-MCNC: 112 MG/DL (ref 70–99)
GLUCOSE BLD-MCNC: 115 MG/DL (ref 70–99)
GLUCOSE BLD-MCNC: 179 MG/DL (ref 70–99)
HBA1C MFR BLD: 5.9 %
HCT VFR BLD CALC: 41.7 % (ref 40.5–52.5)
HEMOGLOBIN: 14.2 G/DL (ref 13.5–17.5)
LYMPHOCYTES ABSOLUTE: 1.2 K/UL (ref 1–5.1)
LYMPHOCYTES RELATIVE PERCENT: 16.9 %
MCH RBC QN AUTO: 32.5 PG (ref 26–34)
MCHC RBC AUTO-ENTMCNC: 34.1 G/DL (ref 31–36)
MCV RBC AUTO: 95.2 FL (ref 80–100)
MONOCYTES ABSOLUTE: 0.4 K/UL (ref 0–1.3)
MONOCYTES RELATIVE PERCENT: 6 %
NEUTROPHILS ABSOLUTE: 5.2 K/UL (ref 1.7–7.7)
NEUTROPHILS RELATIVE PERCENT: 75.9 %
PDW BLD-RTO: 14.6 % (ref 12.4–15.4)
PERFORMED ON: ABNORMAL
PLATELET # BLD: 194 K/UL (ref 135–450)
PMV BLD AUTO: 9.8 FL (ref 5–10.5)
POC ACT LR: 151 SEC
POC ACT LR: 162 SEC
POC ACT LR: 191 SEC
POC ACT LR: 215 SEC
POC ACT LR: 243 SEC
POC ACT LR: 251 SEC
POC ACT LR: 293 SEC
POC ACT LR: 301 SEC
POTASSIUM REFLEX MAGNESIUM: 3.8 MMOL/L (ref 3.5–5.1)
POTASSIUM SERPL-SCNC: 4.2 MMOL/L (ref 3.5–5.1)
PRO-BNP: 1087 PG/ML (ref 0–449)
RBC # BLD: 4.38 M/UL (ref 4.2–5.9)
SODIUM BLD-SCNC: 139 MMOL/L (ref 136–145)
SODIUM BLD-SCNC: 140 MMOL/L (ref 136–145)
TOTAL PROTEIN: 7.3 G/DL (ref 6.4–8.2)
TROPONIN: 0.02 NG/ML
TROPONIN: 0.11 NG/ML
TROPONIN: 0.18 NG/ML
TROPONIN: 0.24 NG/ML
WBC # BLD: 6.8 K/UL (ref 4–11)

## 2022-07-13 PROCEDURE — B2131ZZ FLUOROSCOPY OF MULTIPLE CORONARY ARTERY BYPASS GRAFTS USING LOW OSMOLAR CONTRAST: ICD-10-PCS | Performed by: INTERNAL MEDICINE

## 2022-07-13 PROCEDURE — 99152 MOD SED SAME PHYS/QHP 5/>YRS: CPT | Performed by: INTERNAL MEDICINE

## 2022-07-13 PROCEDURE — 2580000003 HC RX 258

## 2022-07-13 PROCEDURE — 93455 CORONARY ART/GRFT ANGIO S&I: CPT | Performed by: INTERNAL MEDICINE

## 2022-07-13 PROCEDURE — 83036 HEMOGLOBIN GLYCOSYLATED A1C: CPT

## 2022-07-13 PROCEDURE — 6360000002 HC RX W HCPCS: Performed by: INTERNAL MEDICINE

## 2022-07-13 PROCEDURE — 93455 CORONARY ART/GRFT ANGIO S&I: CPT

## 2022-07-13 PROCEDURE — 2500000003 HC RX 250 WO HCPCS: Performed by: PHYSICIAN ASSISTANT

## 2022-07-13 PROCEDURE — B2151ZZ FLUOROSCOPY OF LEFT HEART USING LOW OSMOLAR CONTRAST: ICD-10-PCS | Performed by: INTERNAL MEDICINE

## 2022-07-13 PROCEDURE — 6370000000 HC RX 637 (ALT 250 FOR IP): Performed by: INTERNAL MEDICINE

## 2022-07-13 PROCEDURE — 36415 COLL VENOUS BLD VENIPUNCTURE: CPT

## 2022-07-13 PROCEDURE — 85347 COAGULATION TIME ACTIVATED: CPT

## 2022-07-13 PROCEDURE — C1894 INTRO/SHEATH, NON-LASER: HCPCS

## 2022-07-13 PROCEDURE — C1725 CATH, TRANSLUMIN NON-LASER: HCPCS

## 2022-07-13 PROCEDURE — 84484 ASSAY OF TROPONIN QUANT: CPT

## 2022-07-13 PROCEDURE — 02F03ZZ FRAGMENTATION IN CORONARY ARTERY, ONE ARTERY, PERCUTANEOUS APPROACH: ICD-10-PCS | Performed by: INTERNAL MEDICINE

## 2022-07-13 PROCEDURE — 2000000000 HC ICU R&B

## 2022-07-13 PROCEDURE — 6360000002 HC RX W HCPCS: Performed by: PHYSICIAN ASSISTANT

## 2022-07-13 PROCEDURE — 80048 BASIC METABOLIC PNL TOTAL CA: CPT

## 2022-07-13 PROCEDURE — B2111ZZ FLUOROSCOPY OF MULTIPLE CORONARY ARTERIES USING LOW OSMOLAR CONTRAST: ICD-10-PCS | Performed by: INTERNAL MEDICINE

## 2022-07-13 PROCEDURE — C1874 STENT, COATED/COV W/DEL SYS: HCPCS

## 2022-07-13 PROCEDURE — 93005 ELECTROCARDIOGRAM TRACING: CPT | Performed by: EMERGENCY MEDICINE

## 2022-07-13 PROCEDURE — 99223 1ST HOSP IP/OBS HIGH 75: CPT | Performed by: INTERNAL MEDICINE

## 2022-07-13 PROCEDURE — 2100000000 HC CCU R&B

## 2022-07-13 PROCEDURE — 2709999900 HC NON-CHARGEABLE SUPPLY

## 2022-07-13 PROCEDURE — C9600 PERC DRUG-EL COR STENT SING: HCPCS

## 2022-07-13 PROCEDURE — C1761 HC CATH TRANSLUM INTRAVASCULAR LITHOTRIPSY CORONARY: HCPCS

## 2022-07-13 PROCEDURE — 6370000000 HC RX 637 (ALT 250 FOR IP)

## 2022-07-13 PROCEDURE — 92928 PRQ TCAT PLMT NTRAC ST 1 LES: CPT | Performed by: INTERNAL MEDICINE

## 2022-07-13 PROCEDURE — C1887 CATHETER, GUIDING: HCPCS

## 2022-07-13 PROCEDURE — 93010 ELECTROCARDIOGRAM REPORT: CPT | Performed by: INTERNAL MEDICINE

## 2022-07-13 PROCEDURE — 6360000004 HC RX CONTRAST MEDICATION: Performed by: INTERNAL MEDICINE

## 2022-07-13 PROCEDURE — 0715T HC PERQ TRLUML CORONRY LITHOTRP: CPT

## 2022-07-13 PROCEDURE — 6360000002 HC RX W HCPCS

## 2022-07-13 PROCEDURE — 94761 N-INVAS EAR/PLS OXIMETRY MLT: CPT

## 2022-07-13 PROCEDURE — 2580000003 HC RX 258: Performed by: INTERNAL MEDICINE

## 2022-07-13 PROCEDURE — 6370000000 HC RX 637 (ALT 250 FOR IP): Performed by: PHYSICIAN ASSISTANT

## 2022-07-13 PROCEDURE — 93005 ELECTROCARDIOGRAM TRACING: CPT | Performed by: INTERNAL MEDICINE

## 2022-07-13 PROCEDURE — 94760 N-INVAS EAR/PLS OXIMETRY 1: CPT

## 2022-07-13 PROCEDURE — 99153 MOD SED SAME PHYS/QHP EA: CPT

## 2022-07-13 PROCEDURE — C1769 GUIDE WIRE: HCPCS

## 2022-07-13 PROCEDURE — 85730 THROMBOPLASTIN TIME PARTIAL: CPT

## 2022-07-13 PROCEDURE — 99152 MOD SED SAME PHYS/QHP 5/>YRS: CPT

## 2022-07-13 PROCEDURE — 027035Z DILATION OF CORONARY ARTERY, ONE ARTERY WITH TWO DRUG-ELUTING INTRALUMINAL DEVICES, PERCUTANEOUS APPROACH: ICD-10-PCS | Performed by: INTERNAL MEDICINE

## 2022-07-13 PROCEDURE — 4A023N7 MEASUREMENT OF CARDIAC SAMPLING AND PRESSURE, LEFT HEART, PERCUTANEOUS APPROACH: ICD-10-PCS | Performed by: INTERNAL MEDICINE

## 2022-07-13 PROCEDURE — 2500000003 HC RX 250 WO HCPCS

## 2022-07-13 RX ORDER — TAMSULOSIN HYDROCHLORIDE 0.4 MG/1
0.4 CAPSULE ORAL DAILY
Status: DISCONTINUED | OUTPATIENT
Start: 2022-07-13 | End: 2022-07-14 | Stop reason: HOSPADM

## 2022-07-13 RX ORDER — ATORVASTATIN CALCIUM 80 MG/1
80 TABLET, FILM COATED ORAL NIGHTLY
Status: DISCONTINUED | OUTPATIENT
Start: 2022-07-13 | End: 2022-07-14 | Stop reason: HOSPADM

## 2022-07-13 RX ORDER — ONDANSETRON 4 MG/1
4 TABLET, ORALLY DISINTEGRATING ORAL EVERY 8 HOURS PRN
Status: DISCONTINUED | OUTPATIENT
Start: 2022-07-13 | End: 2022-07-14 | Stop reason: HOSPADM

## 2022-07-13 RX ORDER — NITROGLYCERIN 0.4 MG/1
0.4 TABLET SUBLINGUAL EVERY 5 MIN PRN
Status: DISCONTINUED | OUTPATIENT
Start: 2022-07-13 | End: 2022-07-14 | Stop reason: HOSPADM

## 2022-07-13 RX ORDER — SODIUM CHLORIDE 0.9 % (FLUSH) 0.9 %
5-40 SYRINGE (ML) INJECTION EVERY 12 HOURS SCHEDULED
Status: DISCONTINUED | OUTPATIENT
Start: 2022-07-13 | End: 2022-07-14 | Stop reason: HOSPADM

## 2022-07-13 RX ORDER — CLOPIDOGREL BISULFATE 75 MG/1
75 TABLET ORAL DAILY
Status: DISCONTINUED | OUTPATIENT
Start: 2022-07-13 | End: 2022-07-14 | Stop reason: HOSPADM

## 2022-07-13 RX ORDER — ATROPINE SULFATE 0.1 MG/ML
INJECTION INTRAVENOUS
Status: DISCONTINUED
Start: 2022-07-13 | End: 2022-07-13 | Stop reason: WASHOUT

## 2022-07-13 RX ORDER — DEXTROSE MONOHYDRATE 50 MG/ML
100 INJECTION, SOLUTION INTRAVENOUS PRN
Status: DISCONTINUED | OUTPATIENT
Start: 2022-07-13 | End: 2022-07-14 | Stop reason: HOSPADM

## 2022-07-13 RX ORDER — INSULIN LISPRO 100 [IU]/ML
0-6 INJECTION, SOLUTION INTRAVENOUS; SUBCUTANEOUS NIGHTLY
Status: DISCONTINUED | OUTPATIENT
Start: 2022-07-13 | End: 2022-07-14 | Stop reason: HOSPADM

## 2022-07-13 RX ORDER — LISINOPRIL 20 MG/1
20 TABLET ORAL DAILY
Status: DISCONTINUED | OUTPATIENT
Start: 2022-07-13 | End: 2022-07-14 | Stop reason: HOSPADM

## 2022-07-13 RX ORDER — ASPIRIN 81 MG/1
81 TABLET, CHEWABLE ORAL DAILY
Status: DISCONTINUED | OUTPATIENT
Start: 2022-07-13 | End: 2022-07-14 | Stop reason: HOSPADM

## 2022-07-13 RX ORDER — ACETAMINOPHEN 650 MG/1
650 SUPPOSITORY RECTAL EVERY 6 HOURS PRN
Status: DISCONTINUED | OUTPATIENT
Start: 2022-07-13 | End: 2022-07-14 | Stop reason: HOSPADM

## 2022-07-13 RX ORDER — SODIUM CHLORIDE 0.9 % (FLUSH) 0.9 %
5-40 SYRINGE (ML) INJECTION PRN
Status: DISCONTINUED | OUTPATIENT
Start: 2022-07-13 | End: 2022-07-14 | Stop reason: HOSPADM

## 2022-07-13 RX ORDER — FLUTICASONE PROPIONATE 50 MCG
2 SPRAY, SUSPENSION (ML) NASAL DAILY PRN
Status: DISCONTINUED | OUTPATIENT
Start: 2022-07-13 | End: 2022-07-14 | Stop reason: HOSPADM

## 2022-07-13 RX ORDER — POLYETHYLENE GLYCOL 3350 17 G/17G
17 POWDER, FOR SOLUTION ORAL DAILY PRN
Status: DISCONTINUED | OUTPATIENT
Start: 2022-07-13 | End: 2022-07-14 | Stop reason: HOSPADM

## 2022-07-13 RX ORDER — ACETAMINOPHEN 325 MG/1
650 TABLET ORAL EVERY 6 HOURS PRN
Status: DISCONTINUED | OUTPATIENT
Start: 2022-07-13 | End: 2022-07-14 | Stop reason: HOSPADM

## 2022-07-13 RX ORDER — HYDROCHLOROTHIAZIDE 25 MG/1
25 TABLET ORAL DAILY
Status: DISCONTINUED | OUTPATIENT
Start: 2022-07-13 | End: 2022-07-14 | Stop reason: HOSPADM

## 2022-07-13 RX ORDER — METOPROLOL TARTRATE 5 MG/5ML
2.5 INJECTION INTRAVENOUS ONCE
Status: DISCONTINUED | OUTPATIENT
Start: 2022-07-13 | End: 2022-07-14

## 2022-07-13 RX ORDER — SODIUM CHLORIDE 9 MG/ML
INJECTION, SOLUTION INTRAVENOUS PRN
Status: DISCONTINUED | OUTPATIENT
Start: 2022-07-13 | End: 2022-07-14 | Stop reason: HOSPADM

## 2022-07-13 RX ORDER — INSULIN LISPRO 100 [IU]/ML
0-12 INJECTION, SOLUTION INTRAVENOUS; SUBCUTANEOUS
Status: DISCONTINUED | OUTPATIENT
Start: 2022-07-13 | End: 2022-07-14 | Stop reason: HOSPADM

## 2022-07-13 RX ORDER — LABETALOL HYDROCHLORIDE 5 MG/ML
5 INJECTION, SOLUTION INTRAVENOUS ONCE
Status: COMPLETED | OUTPATIENT
Start: 2022-07-13 | End: 2022-07-13

## 2022-07-13 RX ORDER — ONDANSETRON 2 MG/ML
4 INJECTION INTRAMUSCULAR; INTRAVENOUS EVERY 6 HOURS PRN
Status: DISCONTINUED | OUTPATIENT
Start: 2022-07-13 | End: 2022-07-14 | Stop reason: HOSPADM

## 2022-07-13 RX ADMIN — HEPARIN SODIUM 2000 UNITS: 1000 INJECTION INTRAVENOUS; SUBCUTANEOUS at 09:53

## 2022-07-13 RX ADMIN — METOPROLOL TARTRATE 25 MG: 25 TABLET, FILM COATED ORAL at 05:08

## 2022-07-13 RX ADMIN — IOPAMIDOL 165 ML: 755 INJECTION, SOLUTION INTRAVENOUS at 16:45

## 2022-07-13 RX ADMIN — HEPARIN SODIUM 4000 UNITS: 1000 INJECTION INTRAVENOUS; SUBCUTANEOUS at 00:16

## 2022-07-13 RX ADMIN — METOPROLOL TARTRATE 25 MG: 25 TABLET, FILM COATED ORAL at 20:27

## 2022-07-13 RX ADMIN — NITROGLYCERIN 0.5 INCH: 20 OINTMENT TOPICAL at 00:07

## 2022-07-13 RX ADMIN — LABETALOL HYDROCHLORIDE 5 MG: 5 INJECTION, SOLUTION INTRAVENOUS at 01:08

## 2022-07-13 RX ADMIN — Medication 10 ML: at 21:00

## 2022-07-13 RX ADMIN — HYDROCHLOROTHIAZIDE 25 MG: 25 TABLET ORAL at 07:50

## 2022-07-13 RX ADMIN — METOPROLOL TARTRATE 25 MG: 25 TABLET, FILM COATED ORAL at 07:49

## 2022-07-13 RX ADMIN — LISINOPRIL 20 MG: 20 TABLET ORAL at 07:50

## 2022-07-13 RX ADMIN — ATORVASTATIN CALCIUM 80 MG: 80 TABLET, FILM COATED ORAL at 20:27

## 2022-07-13 RX ADMIN — INSULIN LISPRO 1 UNITS: 100 INJECTION, SOLUTION INTRAVENOUS; SUBCUTANEOUS at 20:27

## 2022-07-13 RX ADMIN — TAMSULOSIN HYDROCHLORIDE 0.4 MG: 0.4 CAPSULE ORAL at 07:49

## 2022-07-13 RX ADMIN — HEPARIN SODIUM 12 UNITS/KG/HR: 10000 INJECTION, SOLUTION INTRAVENOUS at 00:26

## 2022-07-13 RX ADMIN — CLOPIDOGREL BISULFATE 75 MG: 75 TABLET ORAL at 07:49

## 2022-07-13 RX ADMIN — ASPIRIN 324 MG: 81 TABLET, CHEWABLE ORAL at 00:07

## 2022-07-13 RX ADMIN — ASPIRIN 81 MG: 81 TABLET, CHEWABLE ORAL at 07:49

## 2022-07-13 ASSESSMENT — PAIN DESCRIPTION - LOCATION: LOCATION: CHEST

## 2022-07-13 ASSESSMENT — PAIN SCALES - GENERAL
PAINLEVEL_OUTOF10: 0

## 2022-07-13 NOTE — PLAN OF CARE
Problem: Discharge Planning  Goal: Discharge to home or other facility with appropriate resources  Outcome: Progressing  Flowsheets  Taken 7/13/2022 0437  Discharge to home or other facility with appropriate resources: Refer to discharge planning if patient needs post-hospital services based on physician order or complex needs related to functional status, cognitive ability or social support system  Taken 7/13/2022 0430  Discharge to home or other facility with appropriate resources: Refer to discharge planning if patient needs post-hospital services based on physician order or complex needs related to functional status, cognitive ability or social support system     Problem: Pain  Goal: Verbalizes/displays adequate comfort level or baseline comfort level  Outcome: Progressing  Flowsheets (Taken 7/13/2022 0430)  Verbalizes/displays adequate comfort level or baseline comfort level: Assess pain using appropriate pain scale

## 2022-07-13 NOTE — PROGRESS NOTES
Acute coronary syndrome- ECG changes with troponin increase  CAD with h/o bypass 2002. LIMA to LAD,SVG to PDA and SVG to OM with OM graft occluded  S/p carotid endarterectomy-left  S/p aortic stent graft 2010      Rec- will need cardiac cath.  He reports iodine allergy but numerous contrast studies without incident

## 2022-07-13 NOTE — H&P
Hospital Medicine History & Physical      PCP: Sara Triana MD    Date of Admission: 7/12/2022    Date of Service: Pt seen/examined on 7/13/2022  and Admitted to Inpatient with expected LOS greater than two midnights due to medical therapy. Chief Complaint:    Chief Complaint   Patient presents with    Chest Pain     Pt states he is having discomfort across his chest and both his arms for the last 2 nights, has been able to get to sleep and the discomfort would go away but was unable to go to sleep tonight because it wouldn't stop. History Of Present Illness: The patient is a 78 y.o. male with history of hypertension, type 2 diabetes, hyperlipidemia, history of AAA status post repair, history of CAD status post CABG in 2020 who presented with 3-day history of intermittent chest pain/pressure radiating to bilateral arms with associated nausea and vomiting as well as diaphoresis and dizziness. Symptoms over the last couple of days with come and go however tonight they were symptoms were persistent hence presentation for further evaluation. He did not take his nitro at home. On presentation to the ER, EKG done was noted for anterolateral ST depression.   Laboratory work-up noted for troponin leak  He was started heparin drip Nitropaste with resolution of EKG changes the ER provider  At the time of my evaluation he is symptom-free    Past Medical History:        Diagnosis Date    Acquired spondylolisthesis 1/29/2014    Arthritis     diffuse    CAD (coronary artery disease)     S/P PCI and CABG    Carotid disease, bilateral [facility] 2/3/2014    Cholelithiasis 11/10/2014    Chronic systolic heart failure ([facility]     Diabetes mellitus type II     Essential hypertension     Hyperlipidemia     Obesity (BMI 30.0-34.9) 2/27/2015    Prolonged emergence from general anesthesia     slow to wake up    Spinal stenosis of lumbar region 1/29/2014    Status post AAA (abdominal aortic aneurysm) repair 11/10/2014       Past Surgical History:        Procedure Laterality Date    ABDOMINAL AORTIC ANEURYSM REPAIR, ENDOVASCULAR  2009    Zayyat; stent    CAROTID ENDARTERECTOMY Left 2006    Zayyat    CARPAL TUNNEL RELEASE Right 11/20/2020    CARPAL TUNNEL RELEASE Left 12/18/2020    CORONARY ANGIOPLASTY WITH STENT PLACEMENT  2009    CORONARY ARTERY BYPASS GRAFT  2002    Gage, x4    CYST REMOVAL Right 1997    wrist    ELBOW SURGERY Left 1984    laceration to tendons    HIP ARTHROPLASTY Left 1999    Fitzgerald    INGUINAL HERNIA REPAIR Left 1985    OTHER SURGICAL HISTORY  03/06/2018    excision of sebacious cyst on back    RETINAL DETACHMENT SURGERY Left 2004    [facility]    REVISION TOTAL HIP ARTHROPLASTY Left 9/23/2019    LEFT TOTAL HIP ARTHROPLASTY REVISION POSTERIOR APPROACH performed by Philippe oSuza MD at [facility] Left 2/24/14    [facility] THYROID SURGERY  2012    biopsied at [facility] Right 03/53/8176   [facility]    VASECTOMY  1975       Medications Prior to Admission:    Prior to Admission medications    Medication Sig Start Date End Date Taking?  Authorizing Provider   tamsulosin (FLOMAX) 0.4 MG capsule TAKE 1 CAPSULE EVERY DAY 7/11/22   Demetri Narayan MD   metFORMIN (GLUCOPHAGE) 1000 MG tablet Take 1 tablet by mouth nightly 7/8/22   Demetri Narayan MD   fluticasone Madelyn Meadow) 50 MCG/ACT nasal spray 2 sprays by Each Nostril route daily 6/3/22   CAROL Coyle - CNP   lisinopril (PRINIVIL;ZESTRIL) 20 MG tablet TAKE 1 TABLET EVERY DAY 3/28/22   Ana Jones MD   hydroCHLOROthiazide (HYDRODIURIL) 25 MG tablet Take 25 mg by mouth daily    Historical Provider, MD   clopidogrel (PLAVIX) 75 MG tablet TAKE 1 TABLET EVERY DAY (NEED MD APPOINTMENT FOR MORE REFILLS) 12/22/21   Demetri Narayan MD   TRUE METRIX BLOOD GLUCOSE TEST strip TEST DAILY AS NEEDED. 8/19/21   Demetri Narayan MD   Blood Glucose Calibration (ACCU-CHEK THOMAS) SOLN Use as needed for blood sugar checks 3/23/21   Krystina Li MD   TRUEplus Lancets 33G MISC CHECK BLOOD SUGAR 4 TIMES PER DAY AS NEEDED 3/22/21   Krystina Li MD   Blood Glucose Monitoring Suppl (TRUE METRIX AIR GLUCOSE METER) NATAHSA Check blood sugar as needed 3/22/21   Krystina Li MD   Alcohol Swabs (B-D SINGLE USE SWABS REGULAR) PADS Use as directed 3/22/21   Krystina Li MD   nitroGLYCERIN (NITROSTAT) 0.4 MG SL tablet Place 1 tablet under the tongue every 5 minutes as needed for Chest pain 3/9/20   Henry Jay MD   aspirin 81 MG tablet Take 81 mg by mouth daily    Historical Provider, MD   ibuprofen (ADVIL) 200 MG CAPS Take 2 capsules by mouth 3 times daily    Historical Provider, MD       Allergies:  Latex and Iodine    Social History:  The patient currently lives with family    TOBACCO:   reports that he quit smoking about 51 years ago. His smoking use included cigarettes. He has a 45.00 pack-year smoking history. He has never used smokeless tobacco.  ETOH:   reports no history of alcohol use. Family History:  Reviewed in detail and negative for DM, Early CAD, Cancer, CVA. Positive as follows:        Problem Relation Age of Onset    Heart Disease Father     Heart Disease Sister     Heart Disease Brother     Cancer Brother         testicular    Breast Cancer Sister        REVIEW OF SYSTEMS:   Positive for chest pain radiating to bilateral arms nausea and vomiting, dizziness and as noted in the HPI. All other systems reviewed and negative. PHYSICAL EXAM:    BP (!) 143/89   Pulse 88   Temp 98.2 °F (36.8 °C) (Oral)   Resp 24   Ht 6' 1\" (1.854 m)   Wt 225 lb (102.1 kg)   SpO2 93%   BMI 29.69 kg/m²     General appearance: No apparent distress appears stated age and cooperative. HEENT Normal cephalic, atraumatic without obvious deformity. Pupils equal, round, and reactive to light. Extra ocular muscles intact. Conjunctivae/corneas clear. Neck: Supple, No jugular venous distention/bruits. Lungs: Clear to auscultation, bilaterally without Rales/Wheezes/Rhonchi with good respiratory effort. Heart: Regular rate and rhythm with Normal S1/S2 without murmurs, rubs or gallops  Abdomen: Soft, non-tender or non-distended without rigidity or guarding and positive bowel sounds  Extremities: No clubbing, cyanosis. Chronic right lower extremity swelling post CABG  Skin: Skin color, texture, turgor normal.  No rashes or lesions. Neurologic: Alert and oriented X 3, neurovascularly intact with sensory/motor intact upper extremities/lower extremities, bilaterally. Cranial nerves: II-XII intact, grossly non-focal.  Mental status: Alert, oriented, thought content appropriate. CBC   Recent Labs     07/12/22  2358   WBC 6.8   HGB 14.2   HCT 41.7         RENAL  Recent Labs     07/12/22  2358      K 4.2      CO2 24   BUN 16   CREATININE 1.0     LFT'S  Recent Labs     07/12/22  2358   AST 21   ALT 15   BILITOT 0.5   ALKPHOS 98     COAG  No results for input(s): INR in the last 72 hours.   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 male with history of hypertension, type 2 diabetes, hyperlipidemia, history of AAA status post repair, history of CAD status post CABG in 2020 who presented with 3-day history of intermittent chest pain/pressure radiating to bilateral arms with associated nausea and vomiting as well as diaphoresis and dizziness concerning for ACS    Plan:  - Continue heparin drip  - Continue Nitropaste  - Continue antianginal therapy including aspirin and Plavix  - Add statin  - Cardiology consult  - Echocardiogram  - Continue other chronic home medication      DVT Prophylaxis: On heparin drip  Diet: Diabetic diet  Code Status: Full code     Marilyn Fernando MD    Thank you Aline Celis MD for the opportunity to be involved in this patient's care. If you have any questions or concerns please feel free to contact me at [phone].

## 2022-07-13 NOTE — ED PROVIDER NOTES
EKG Interpretation    Interpreted by emergency department physician    Rhythm: sinus tachycardia  Rate: 110-120  Axis: normal  Ectopy: none  Conduction:  right bundle branch block (complete)  ST Segments: depression in  v4 v5 v6  T Waves: no acute change  Q Waves: none    Clinical Impression: Sinus tachycardia    MD Berhane Vincent MD  07/12/22 0398

## 2022-07-13 NOTE — ED PROVIDER NOTES
[facility]    Physician Attestation    Pt Name: Cisco Agrawal  MRN: [id number]  Asael 1940  Date of evaluation: 7/12/22        Physician Note:    I havepersonally performed and/or participated in the history, exam and medical decision making and agree with all pertinent clinical information. I have also reviewed and agree with the past medical, family and social historyunless otherwise noted. I have personally performed a face to face diagnostic evaluation onthis patient. I have reviewed the mid-levels findings and agree. History: Has had several days of pressure across his chest and into the upper arms usually with exertion better at rest had a episode tonight while lying down of increased pressure it is gone away at this time he has had a bypass about 10 years ago no recent stress test      REVIEW OF SYSTEMS    Constitutional:  Denies fever, chills, or weakness   Eyes:  Denies vision changes  HENT:  Denies sore throat or neck pain   Respiratory:  Denies cough or shortness of breath   Cardiovascular:   chest pain  GI:  Denies abdominal pain, nausea, vomiting, or diarrhea   Musculoskeletal:  Denies back pain   Skin: no rash or vesicles   Neurologic:  no headache weakness focal    Lymphatic:  no swollen  nodes   Psychiatric: no si or hs thoughts     All systems negative except as marked. General Appearance:  Alert, cooperative, no distress, appears stated age. Head:  Normocephalic, without obvious abnormality, atraumatic. Eyes:  conjunctiva/corneas clear, EOM's intact. Sclera anicteric. ENT: Mucous membranes moist.   Neck: Supple, symmetrical, trachea midline, no adenopathy. No jugular venous distention. Lungs:   No Respiratory Distress. no rales  rhonchi rub   Chest Wall:  Nontender  no deformity   Heart:  Rsr no murmer gallop    Abdomen:   Soft nontender no organomegally    Extremities:  Full range of motion. no deformity   Pulses: Equal  upper and lower    Skin:  No rashes or lesions to exposed skin. Neurologic: Alert and oriented X 3. Motor grossly normal.  Speech clear. Cr n 2-12 intact   EKG Interpretation    Interpreted by emergency department physician    Rhythm: sinus tachycardia  Rate: 110-120  Axis: normal  Ectopy: none  Conduction: right bundle branch block (complete)  ST Segments: depression in V4 V5 V6  T Waves: no acute change  Q Waves: none    Clinical Impression: Sinus tachycardia    Shraddha Mays MD  EKG Interpretation    Interpreted by emergency department physician    Rhythm: normal sinus   Rate: normal  Axis: left  Ectopy: none  Conduction: normal  ST Segments: Marked improvement in the ST depression in V4 5 and 6  T Waves: no acute change  Q Waves: none    Clinical Impression: Sinus rhythm    Shraddha Mays MD    Patient has received heparin and nitroglycerin ointment he states his pain is now gone initial troponin 0.02 patient will be admitted for further care repeat EKG shows marked improvement in the ST depression    1. Acute coronary syndrome (HCC)          DISPOSITION/PLAN  PATIENT REFERRED TO:  No follow-up provider specified. DISCHARGE MEDICATIONS:  New Prescriptions    No medications on file         Is this patient to be included in the SEP-1 Core Measure due to severe sepsis or septic shock? No   Exclusion criteria - the patient is NOT to be included for SEP-1 Core Measure due to:   Infection is not suspected      MD Shraddha Romero MD  07/13/22 [id number]

## 2022-07-13 NOTE — CONSULTS
[place] [address]                                  CONSULTATION    PATIENT NAME: Dg Polk                    :        1940  MED REC NO:   [id number]                          ROOM:       [id number]  ACCOUNT NO:   [de-identified]                           ADMIT DATE: 2022  PROVIDER:     Tito Short MD    CONSULT DATE:  2022    REASON FOR CONSULTATION:  Evaluate patient who presents with chest  pressure, abnormal EKG. HISTORY OF PRESENT ILLNESS:  The patient is an 78-year-old gentleman who  had been feeling well up until the last week or so, he has had recurring  pressure in his chest.  It did become persistent and ultimately he has  decided he should come to the hospital.  When he arrived, he was having  chest discomfort. He was started on heparin drip, symptoms resolved. EKG showed ST-segment depression. The patient has longstanding coronary  artery disease with bypass graft surgery approximately 20 years ago. I  believe he had attempts at intervention on a vein graft to obtuse  marginal.  Obtuse marginal ultimately occluded. The last cardiac  catheterization  in anticipation of aortic stent graft showed obtuse  marginal vein graft occluded, vein graft to PDA patent, LIMA graft to  LAD patent. He has had stable angina ever since and has been able to be  quite active. The patient also has had aortic stent graft placed in   for abdominal aortic aneurysm. This has been very stable in follow  up. The patient has had left carotid endarterectomy as well. PAST MEDICAL HISTORY:  Notable for generalized arthritis,  osteoarthritis, coronary artery disease, cholelithiasis, diabetes  mellitus, hypertension, hyperlipidemia.     PAST SURGICAL HISTORY:  Includes umbilical hernia repair remotely, hip  arthroplasty , carotid endarterectomy in the left , aortic stent  graft , coronary bypass graft surgery 2002, vasectomy 1975. He has  had thyroid surgery, retinal detachment surgery. He has had stenting of  obtuse marginal vein graft in the past.  Inguinal hernia repair, elbow  surgery, total knee arthroplasty, carpal tunnel release surgery. SOCIAL HISTORY:  Quit smoking many years ago. Does not drink alcohol. 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 FAMILY HISTORY:  Negative for premature coronary artery disease, but  family members have coronary artery disease. MEDICATIONS:  Flomax, lisinopril, Plavix, Glucophage, nitroglycerin  p.r.n., Norvasc, Januvia, Advil, Cymbalta, Neurontin. ALLERGIES:  Latex and iodine although of note is that he has had  numerous cardiac cath procedures without incident. REVIEW OF SYSTEMS:  14-system review of systems otherwise negative. PHYSICAL EXAMINATION:  VITAL SIGNS:  Blood pressure is 140/80, when he arrived it was  significantly elevated at 203/114, pulse rate is 72, respirations 14,  pulse oximeter on room air 97%. HEENT:  Sclerae are clear. Posterior pharynx clear. Neck is supple. No carotid bruits are heard, left carotid endarterectomy scar. LUNGS:  Lung fields are clear. CARDIOVASCULAR:  Cardiac sounds are notable for a soft diastolic murmur. Last echocardiogram 2 years ago showed aortic sclerosis. ABDOMEN:  Soft, no masses are felt. EXTREMITIES:  Without edema. He has good peripheral pulses, good  femoral pulses bilaterally. Good pedal pulses bilaterally. NEUROLOGIC:  Moves all extremities well. Cranial nerves II-XII intact. SKIN:  Warm and dry. LABORATORY DATA:  Shows his troponin has risen to 0.04. ProBNP 1087,  creatinine 0.9, hemoglobin 14.2. RADIOLOGIC DATA:  Initial EKG shows right bundle branch block with  marked ST-segment depression laterally, then a subsequent EKG showed  marked improvement in his ST segments.     IMPRESSION:  Active 78-year-old gentleman with longstanding coronary  artery disease, peripheral vascular disease that has been stable in  followup, now presents with clear onset of acute coronary syndrome. PLAN:  Reviewed case with Dr. Chuy Zeng. We will proceed with cardiac  catheterization. The patient does report iodine allergy, but again over  the years we have given him contrast without any incident and likely  without any premedication. Please note 70 minutes of time is spent with majority of time with  direct patient contact performing this consultation.         Kendrick Blood MD    D: 07/13/2022 8:35:05       T: 07/13/2022 8:39:56     [email or web address]  Job#: [id number]     Doc#: [id number]    CC:

## 2022-07-13 NOTE — PRE SEDATION
room air     Sedation/Anesthesia Plan:  Guard patient safety and welfare. Minimize physical discomfort and pain. Minimize negative psychological responses to treatment by providing sedation and analgesia and maximize the potential amnesia. Patient to meet pre-procedure discharge plan.      Medication Planned:  Midazolam intravenously and fentanyl intravenously     Patient is an appropriate candidate for plan of sedation:   Yes    Electronically signed by Sammy Polk RN on 7/13/2022 at 8:58 AM

## 2022-07-13 NOTE — PROGRESS NOTES
Morning assessment completed, patient denies needs at this time, call light in reach, will continue to monitor. Pt denies pain , SOB , and numbness to hands.

## 2022-07-13 NOTE — ED PROVIDER NOTES
[facility]        Pt Name: Jessica Oro  MRN: [id number]  [place] 1940  Date of evaluation: 7/12/2022  Provider: Fernanda Aschoff, PA-C  PCP: Rodri Hurst MD  Note Started: 11:15 PM EDT        I have seen and evaluated this patient with my supervising physician Viri Fox MD.    [address] [facility]       Chief Complaint   Patient presents with    Chest Pain     Pt states he is having discomfort across his chest and both his arms for the last 2 nights, has been able to get to sleep and the discomfort would go away but was unable to go to sleep tonight because it wouldn't stop. HISTORY OF PRESENT ILLNESS   (Location, Timing/Onset, Context/Setting, Quality, Duration, Modifying Factors, Severity, Associated Signs and Symptoms)  Note limiting factors. Chief Complaint: Chest pain    Jessica Oro is a 78 y.o. male who presents to the emergency department today for evaluation for chest pain. The patient has a past medical history of coronary artery disease, has had a CABG approximately 10 years ago. He has a history of hypertension, hyperlipidemia, history of CHF. Patient states that intermittently for the past 2 days he will experience a pain across his chest with radiation down both of his arms. The patient states that his pain has been waxing and waning. The patient states that he felt fine throughout the day today however when he was walking up the steps tonight to go to bed he states that he had a discomfort and pressure-like sensation across his chest and radiation down both arms. The patient states that at that time the pain was rated as a 9/10 and he did have associated dizziness. He denied feeling short of breath, diaphoretic, nauseous with the pain. The patient states that since he has been in the emergency room, sitting and resting, he is actually asymptomatic at this time.   The patient denies any recent travels, immobilizations or surgeries. He has no history of DVT or PE. He has no lower leg pain or swelling. Patient states over the past 3 days he had flulike symptoms including a dry cough, nasal congestion although states that the symptoms have essentially resolved. He is not any vomiting. No diarrhea. No urinary symptoms. No other complaints. Nursing Notes were all reviewed and agreed with or any disagreements were addressed in the HPI. REVIEW OF SYSTEMS    (2-9 systems for level 4, 10 or more for level 5)     Review of Systems   Constitutional: Negative for activity change, appetite change, chills and fever. HENT: Negative for congestion and rhinorrhea. Respiratory: Negative for cough and shortness of breath. Cardiovascular: Positive for chest pain. Gastrointestinal: Negative for abdominal pain, diarrhea, nausea and vomiting. Genitourinary: Negative for difficulty urinating, dysuria and hematuria. Positives and Pertinent negatives as per HPI. Except as noted above in the ROS, all other systems were reviewed and negative.        PAST MEDICAL HISTORY     Past Medical History:   Diagnosis Date    Acquired spondylolisthesis 1/29/2014    Arthritis     diffuse    CAD (coronary artery disease)     S/P PCI and CABG    Carotid disease, bilateral ([facility] [address] 2/3/2014    Cholelithiasis 11/10/2014    Chronic systolic heart failure ([facility] [address]     Diabetes mellitus type II     Essential hypertension     Hyperlipidemia     Obesity (BMI 30.0-34.9) 2/27/2015    Prolonged emergence from general anesthesia     slow to wake up    Spinal stenosis of lumbar region 1/29/2014    Status post AAA (abdominal aortic aneurysm) repair 11/10/2014         SURGICAL HISTORY     Past Surgical History:   Procedure Laterality Date    ABDOMINAL AORTIC ANEURYSM REPAIR, ENDOVASCULAR  2009    Priscila; stent    CAROTID ENDARTERECTOMY Left 2006    Priscila    CARPAL TUNNEL RELEASE Right 11/20/2020    CARPAL TUNNEL RELEASE Left 12/18/2020    CORONARY ANGIOPLASTY WITH STENT PLACEMENT  2009    CORONARY ARTERY BYPASS GRAFT  2002    Gage, x4    CYST REMOVAL Right 1997    wrist    ELBOW SURGERY Left 1984    laceration to tendons    HIP ARTHROPLASTY Left 1999    Fitzgerald    INGUINAL HERNIA REPAIR Left 1985    OTHER SURGICAL HISTORY  03/06/2018    excision of sebacious cyst on back    RETINAL DETACHMENT SURGERY Left 2004    [facility]    REVISION TOTAL HIP ARTHROPLASTY Left 9/23/2019    LEFT TOTAL HIP ARTHROPLASTY REVISION POSTERIOR APPROACH performed by Sarah Willis MD at [facility] Left 2/24/14    [facility] [place] THYROID SURGERY  2012    biopsied at [facility] Right 33/90/1574   [address]       Previous Medications    ALCOHOL SWABS (B-D SINGLE USE SWABS REGULAR) PADS    Use as directed    ASPIRIN 81 MG TABLET    Take 81 mg by mouth daily    BLOOD GLUCOSE CALIBRATION (ACCU-CHEK THOMAS) SOLN    Use as needed for blood sugar checks    BLOOD GLUCOSE MONITORING SUPPL (TRUE METRIX AIR GLUCOSE METER) NATASHA    Check blood sugar as needed    CLOPIDOGREL (PLAVIX) 75 MG TABLET    TAKE 1 TABLET EVERY DAY (NEED MD APPOINTMENT FOR MORE REFILLS)    FLUTICASONE (FLONASE) 50 MCG/ACT NASAL SPRAY    2 sprays by Each Nostril route daily    HYDROCHLOROTHIAZIDE (HYDRODIURIL) 25 MG TABLET    Take 25 mg by mouth daily    IBUPROFEN (ADVIL) 200 MG CAPS    Take 2 capsules by mouth 3 times daily    LISINOPRIL (PRINIVIL;ZESTRIL) 20 MG TABLET    TAKE 1 TABLET EVERY DAY    METFORMIN (GLUCOPHAGE) 1000 MG TABLET    Take 1 tablet by mouth nightly    NITROGLYCERIN (NITROSTAT) 0.4 MG SL TABLET    Place 1 tablet under the tongue every 5 minutes as needed for Chest pain    TAMSULOSIN (FLOMAX) 0.4 MG CAPSULE    TAKE 1 CAPSULE EVERY DAY    TRUE METRIX BLOOD GLUCOSE TEST STRIP    TEST DAILY AS NEEDED.     TRUEPLUS LANCETS 33G MISC    CHECK BLOOD SUGAR 4 TIMES PER DAY AS NEEDED         ALLERGIES     Latex and Iodine    FAMILYHISTORY       Family History   Problem Relation Age of Onset    Heart Disease Father     Heart Disease Sister     Heart Disease Brother     Cancer Brother         testicular    Breast Cancer Sister           SOCIAL HISTORY       Social History     Tobacco Use    Smoking status: Former Smoker     Packs/day: 3.00     Years: 15.00     Pack years: 45.00     Types: Cigarettes     Quit date: 1971     Years since quittin.0    Smokeless tobacco: Never Used    Tobacco comment: quit 40 years ago   Vaping Use    Vaping Use: Never used   Substance Use Topics    Alcohol use: No    Drug use: No       SCREENINGS    [place] Coma Scale  Eye Opening: Spontaneous  Best Verbal Response: Oriented  Best Motor Response: Obeys commands  [place] Coma Scale Score: 15        PHYSICAL EXAM    (up to 7 for level 4, 8 or more for level 5)     ED Triage Vitals [22 2250]   BP Temp Temp Source Heart Rate Resp SpO2 Height Weight   (!) 203/114 98.2 °F (36.8 °C) Oral (!) 104 20 96 % 6' 1\" (1.854 m) 225 lb (102.1 kg)       Physical Exam  Vitals and nursing note reviewed. Constitutional:       Appearance: He is well-developed. He is not diaphoretic. HENT:      Head: Normocephalic and atraumatic. Right Ear: External ear normal.      Left Ear: External ear normal.      Nose: Nose normal.   Eyes:      General:         Right eye: No discharge. Left eye: No discharge. Neck:      Trachea: No tracheal deviation. Cardiovascular:      Rate and Rhythm: Regular rhythm. Tachycardia present. Pulses: Normal pulses. Pulmonary:      Effort: Pulmonary effort is normal. No respiratory distress. Breath sounds: Normal breath sounds. No wheezing or rales. Abdominal:      General: Bowel sounds are normal. There is no distension. Palpations: Abdomen is soft. Tenderness: There is no abdominal tenderness. There is no guarding.    Musculoskeletal: urgent intervention. Total critical care time with the patient was 45 minutes excluding separately reportable procedures. Critical care required due to patients NSTEMI, EKG changes, requiring heparin, extensive work-up and admission      CONSULTS:  None      EMERGENCY DEPARTMENT COURSE and DIFFERENTIAL DIAGNOSIS/MDM:   Vitals:    Vitals:    07/13/22 0046 07/13/22 0101 07/13/22 0116 07/13/22 0131   BP: (!) 151/102 (!) 188/136 (!) 156/102 (!) 146/94   Pulse: 94 (!) 101 90 88   Resp: 18 21 25 25   Temp:       TempSrc:       SpO2: 95% 97% 95% 94%   Weight:       Height:           Patient was given the following medications:  Medications   heparin (porcine) injection 4,000 Units (has no administration in time range)   heparin (porcine) injection 2,000 Units (has no administration in time range)   heparin 25,000 units in dextrose 5% 250 mL (premix) infusion (12 Units/kg/hr × 102.1 kg IntraVENous New Bag 7/13/22 0026)   nitroglycerin (NITRO-BID) 2 % ointment 0.5 inch (0.5 inches Topical Given 7/13/22 0007)   aspirin chewable tablet 324 mg (324 mg Oral Given 7/13/22 0007)   heparin (porcine) injection 4,000 Units (4,000 Units IntraVENous Given 7/13/22 0016)   labetalol (NORMODYNE;TRANDATE) injection 5 mg (5 mg IntraVENous Given 7/13/22 0108)         Is this patient to be included in the SEP-1 Core Measure due to severe sepsis or septic shock? No   Exclusion criteria - the patient is NOT to be included for SEP-1 Core Measure due to: Infection is not suspected    Briefly, this is a 64-year-old male who presents the emergency department today with chest pain. History of hypertension hyperlipidemia, coronary artery disease, status post CABG. Patient states that intermittently for the past 2 days he will have chest pain. He states that he felt fine throughout the day today until walking up the steps did attempt to go to bed he states that he had a pressure-like sensation across his chest with radiation down both arms. When he arrived to the ED he states that he is resting, and otherwise is otherwise asymptomatic    Physical examination,  He is mildly tachycardic, otherwise is unremarkable, he is otherwise neurologically    EKG does show significant ST segment depressions, please see attending note for further details. CBC shows no evidence of leukocytosis or anemia. CMP is unremarkable. Terms of 0.02, patient has remained pain-free. He was given aspirin, nitro for his blood pressure, and was started on heparin due to EKG changes, and concern for NSTEMI. He will need to be admitted for further care and evaluation, hospitalist has been paged for admission, patient is updated, agreeable plan, stable for admission    FINAL IMPRESSION      1. Acute coronary syndrome [facility])          DISPOSITION/PLAN   DISPOSITION Decision To Admit 07/13/2022 12:55:11 AM      PATIENT REFERRED TO:  No follow-up provider specified.     DISCHARGE MEDICATIONS:  New Prescriptions    No medications on file       DISCONTINUED MEDICATIONS:  Discontinued Medications    No medications on file              (Please note that portions of this note were completed with a voice recognition program.  Efforts were made to edit the dictations but occasionally words are mis-transcribed.)    Benedicto Thorne PA-C (electronically signed)            Benedicto Thorne PA-C  07/13/22 0140

## 2022-07-13 NOTE — ED NOTES
Report called to ICU, RN verbalized understanding and denied any need for further information, patient placed on monitor and transported to unit, heparin infusing at time of transport      [facility]  07/13/22 7631

## 2022-07-13 NOTE — PROGRESS NOTES
Patient assessment completed at this time. Patient oriented to room and surroundings. Patient A/O x4 with no complaints of chest pain at this time. Patient PIV CDI and infusing heparin gtt at this time. Patient admission completed at this time. Will continue to monitor.

## 2022-07-13 NOTE — OP NOTE
STATIN Patient started or continued on max tolerated dose of statin or high intensity statin as appropriate.   If no statin prescribed, secondary to intolerance, allergy or patient refusal.

## 2022-07-14 VITALS
BODY MASS INDEX: 30.24 KG/M2 | SYSTOLIC BLOOD PRESSURE: 129 MMHG | DIASTOLIC BLOOD PRESSURE: 78 MMHG | OXYGEN SATURATION: 95 % | HEART RATE: 76 BPM | TEMPERATURE: 97.6 F | HEIGHT: 73 IN | RESPIRATION RATE: 16 BRPM | WEIGHT: 228.18 LBS

## 2022-07-14 LAB
ANION GAP SERPL CALCULATED.3IONS-SCNC: 10 MMOL/L (ref 3–16)
BUN BLDV-MCNC: 14 MG/DL (ref 7–20)
CALCIUM SERPL-MCNC: 9.3 MG/DL (ref 8.3–10.6)
CHLORIDE BLD-SCNC: 106 MMOL/L (ref 99–110)
CO2: 25 MMOL/L (ref 21–32)
CREAT SERPL-MCNC: 0.9 MG/DL (ref 0.8–1.3)
EKG ATRIAL RATE: 66 BPM
EKG ATRIAL RATE: 68 BPM
EKG DIAGNOSIS: NORMAL
EKG DIAGNOSIS: NORMAL
EKG P AXIS: 73 DEGREES
EKG P AXIS: 79 DEGREES
EKG P-R INTERVAL: 184 MS
EKG P-R INTERVAL: 194 MS
EKG Q-T INTERVAL: 438 MS
EKG Q-T INTERVAL: 450 MS
EKG QRS DURATION: 136 MS
EKG QRS DURATION: 138 MS
EKG QTC CALCULATION (BAZETT): 459 MS
EKG QTC CALCULATION (BAZETT): 478 MS
EKG R AXIS: -48 DEGREES
EKG R AXIS: -74 DEGREES
EKG T AXIS: -33 DEGREES
EKG T AXIS: -39 DEGREES
EKG VENTRICULAR RATE: 66 BPM
EKG VENTRICULAR RATE: 68 BPM
GFR AFRICAN AMERICAN: >60
GFR NON-AFRICAN AMERICAN: >60
GLUCOSE BLD-MCNC: 116 MG/DL (ref 70–99)
GLUCOSE BLD-MCNC: 120 MG/DL (ref 70–99)
GLUCOSE BLD-MCNC: 179 MG/DL (ref 70–99)
HCT VFR BLD CALC: 43.7 % (ref 40.5–52.5)
HEMOGLOBIN: 14.6 G/DL (ref 13.5–17.5)
LV EF: 60 %
LVEF MODALITY: NORMAL
MCH RBC QN AUTO: 32.1 PG (ref 26–34)
MCHC RBC AUTO-ENTMCNC: 33.4 G/DL (ref 31–36)
MCV RBC AUTO: 95.9 FL (ref 80–100)
PDW BLD-RTO: 14.7 % (ref 12.4–15.4)
PERFORMED ON: ABNORMAL
PERFORMED ON: ABNORMAL
PLATELET # BLD: 180 K/UL (ref 135–450)
PMV BLD AUTO: 9.9 FL (ref 5–10.5)
POTASSIUM REFLEX MAGNESIUM: 4.2 MMOL/L (ref 3.5–5.1)
RBC # BLD: 4.55 M/UL (ref 4.2–5.9)
SODIUM BLD-SCNC: 141 MMOL/L (ref 136–145)
WBC # BLD: 7.2 K/UL (ref 4–11)

## 2022-07-14 PROCEDURE — 99233 SBSQ HOSP IP/OBS HIGH 50: CPT | Performed by: NURSE PRACTITIONER

## 2022-07-14 PROCEDURE — 85027 COMPLETE CBC AUTOMATED: CPT

## 2022-07-14 PROCEDURE — 80048 BASIC METABOLIC PNL TOTAL CA: CPT

## 2022-07-14 PROCEDURE — 6370000000 HC RX 637 (ALT 250 FOR IP): Performed by: INTERNAL MEDICINE

## 2022-07-14 PROCEDURE — 93306 TTE W/DOPPLER COMPLETE: CPT

## 2022-07-14 PROCEDURE — 2580000003 HC RX 258: Performed by: INTERNAL MEDICINE

## 2022-07-14 PROCEDURE — 93010 ELECTROCARDIOGRAM REPORT: CPT | Performed by: INTERNAL MEDICINE

## 2022-07-14 PROCEDURE — 93005 ELECTROCARDIOGRAM TRACING: CPT | Performed by: INTERNAL MEDICINE

## 2022-07-14 RX ORDER — SODIUM CHLORIDE 9 MG/ML
INJECTION, SOLUTION INTRAVENOUS PRN
Status: DISCONTINUED | OUTPATIENT
Start: 2022-07-14 | End: 2022-07-14 | Stop reason: HOSPADM

## 2022-07-14 RX ORDER — SODIUM CHLORIDE 0.9 % (FLUSH) 0.9 %
5-40 SYRINGE (ML) INJECTION EVERY 12 HOURS SCHEDULED
Status: DISCONTINUED | OUTPATIENT
Start: 2022-07-14 | End: 2022-07-14 | Stop reason: HOSPADM

## 2022-07-14 RX ORDER — ATORVASTATIN CALCIUM 80 MG/1
80 TABLET, FILM COATED ORAL NIGHTLY
Qty: 30 TABLET | Refills: 3 | Status: SHIPPED | OUTPATIENT
Start: 2022-07-14 | End: 2022-08-19 | Stop reason: SDUPTHER

## 2022-07-14 RX ORDER — SODIUM CHLORIDE 0.9 % (FLUSH) 0.9 %
5-40 SYRINGE (ML) INJECTION PRN
Status: DISCONTINUED | OUTPATIENT
Start: 2022-07-14 | End: 2022-07-14 | Stop reason: HOSPADM

## 2022-07-14 RX ADMIN — HYDROCHLOROTHIAZIDE 25 MG: 25 TABLET ORAL at 08:30

## 2022-07-14 RX ADMIN — ASPIRIN 81 MG: 81 TABLET, CHEWABLE ORAL at 08:30

## 2022-07-14 RX ADMIN — METOPROLOL TARTRATE 25 MG: 25 TABLET, FILM COATED ORAL at 08:30

## 2022-07-14 RX ADMIN — CLOPIDOGREL BISULFATE 75 MG: 75 TABLET ORAL at 08:30

## 2022-07-14 RX ADMIN — LISINOPRIL 20 MG: 20 TABLET ORAL at 08:30

## 2022-07-14 RX ADMIN — Medication 10 ML: at 08:30

## 2022-07-14 RX ADMIN — INSULIN LISPRO 2 UNITS: 100 INJECTION, SOLUTION INTRAVENOUS; SUBCUTANEOUS at 11:40

## 2022-07-14 RX ADMIN — TAMSULOSIN HYDROCHLORIDE 0.4 MG: 0.4 CAPSULE ORAL at 08:30

## 2022-07-14 NOTE — PROGRESS NOTES
Arterial sheath removed, manual pressure held for 20 minutes no oozing, redness, or hematoma noticed on assessment. Pressure device left in place, and restrictions were explained to the patient.

## 2022-07-14 NOTE — PROGRESS NOTES
[facility]  DAILY PROGRESS NOTE    Admit Date: 7/12/2022       Chief Complaint: chest pain     Interval History:   Patient seen and examined and notes reviewed. Patient is being followed for CAD, chest pain, s/p LHC. Today he feels well, denies CP, palpitations or SOB, cath site clean and dry    In: 850 [P.O.:840;  I.V.:10]  Out: 1520    Wt Readings from Last 2 Encounters:   07/14/22 228 lb 2.8 oz (103.5 kg)   07/08/22 225 lb (102.1 kg)       Data:   Scheduled Meds:   Scheduled Meds:   sodium chloride flush  5-40 mL IntraVENous 2 times per day    aspirin  81 mg Oral Daily    clopidogrel  75 mg Oral Daily    hydroCHLOROthiazide  25 mg Oral Daily    lisinopril  20 mg Oral Daily    tamsulosin  0.4 mg Oral Daily    sodium chloride flush  5-40 mL IntraVENous 2 times per day    atorvastatin  80 mg Oral Nightly    insulin lispro  0-12 Units SubCUTAneous TID WC    insulin lispro  0-6 Units SubCUTAneous Nightly    metoprolol tartrate  25 mg Oral BID     Continuous Infusions:   sodium chloride      sodium chloride      dextrose       PRN Meds:.sodium chloride flush, sodium chloride, fluticasone, nitroGLYCERIN, sodium chloride flush, sodium chloride, ondansetron **OR** ondansetron, acetaminophen **OR** acetaminophen, polyethylene glycol, perflutren lipid microspheres, dextrose bolus **OR** dextrose bolus, glucagon (rDNA), dextrose  Continuous Infusions:   sodium chloride      sodium chloride      dextrose         Intake/Output Summary (Last 24 hours) at 7/14/2022 1041  Last data filed at 7/14/2022 0918  Gross per 24 hour   Intake 250 ml   Output 1520 ml   Net -1270 ml     Lab Data:  CBC:   Lab Results   Component Value Date/Time    WBC 7.2 07/14/2022 04:21 AM    HGB 14.6 07/14/2022 04:21 AM     07/14/2022 04:21 AM     BMP:  Lab Results   Component Value Date/Time     07/14/2022 04:21 AM    K 4.2 07/14/2022 04:21 AM     07/14/2022 04:21 AM    CO2 25 07/14/2022 04:21 AM    BUN 14 07/14/2022 04:21 AM    CREATININE 0.9 07/14/2022 04:21 AM    GLUCOSE 120 07/14/2022 04:21 AM     INR:   Lab Results   Component Value Date/Time    INR 0.96 11/23/2020 03:26 AM    INR 0.96 11/11/2020 02:56 PM    INR 0.90 09/18/2019 08:18 AM        CARDIAC LABS  ENZYMES:  Recent Labs     07/13/22  0213 07/13/22  0716 07/13/22  1002   TROPONINI 0.11* 0.24* 0.18*     FASTING LIPID PANEL:  Lab Results   Component Value Date/Time    HDL 45 02/22/2022 08:16 AM    HDL 36 11/21/2011 06:40 AM    LDLCALC 195 02/22/2022 08:16 AM    TRIG 94 02/22/2022 08:16 AM    TSH 0.79 01/24/2014 08:12 AM     LIVER PROFILE:  Lab Results   Component Value Date/Time    AST 21 07/12/2022 11:58 PM    AST 13 02/22/2022 08:16 AM    ALT 15 07/12/2022 11:58 PM    ALT 11 02/22/2022 08:16 AM     BNP:   Lab Results   Component Value Date/Time    PROBNP 1,087 07/12/2022 11:58 PM    PROBNP 573 11/22/2020 11:06 AM     Iron Studies:  No results found for: FERRITIN        1. WEIGHT: Admit Weight: 225 lb (102.1 kg)      Today  Weight: 228 lb 2.8 oz (103.5 kg)   2. I/O     Intake/Output Summary (Last 24 hours) at 7/14/2022 1041  Last data filed at 7/14/2022 0918  Gross per 24 hour   Intake 250 ml   Output 1520 ml   Net -1270 ml       Cardiac Testing:     ECHO 7/12/22:  Summary   Abnormal arrhythmia noted during study. Left ventricular cavity size is normal with mild concentric left ventricular   hypertrophy. Ejection fraction is visually estimated to be 60%. No obvious regional wall motion abnormalities are noted. Grade I diastolic dysfunction with normal LV filling pressures. E/e' = 14.8. The left atrium is mildly dilated. The aortic valve appears sclerotic but opens well. Mitral annular calcification is present. Trivial aortic regurgitation. Mild mitral regurgitation. Trivial tricuspid regurgitation. RVSP = 19 mmHG.       [facility] 7/13/22:    Findings:  Artery Findings/Result   LM Normal   LAD 70% prox, 100% mid   Cx 100% prox   RI N/A   RCA region     Acquired spondylolisthesis     Hyperlipidemia     Vestibular dizziness involving left inner ear     Sebaceous cyst     Syncope and collapse     Chronic renal disease, stage III (HCC) [505335]     Acute coronary syndrome (HCC)        Assessment/Plan:     1. Chest pain- resolved  2. CAD- s/p PCI, continue plavix, statin, ASA, BB, pt has f/u with cardio, refer to CR  3. HTN- improved with BB, continue lisinopril      The patient was seen for >25 minutes. I reviewed interval history, physical exam, review of data including labs, imaging, development and implementation of treatment plan and coordination of complex care.  More than 50% of the time was devoted to counseling the patient on their diagnoses/treatments, as well as coordination of care with the other care teams    Farhan Davila, APRN - CNP, ACNP, AGPCNP  Heart Failure  The [address] [place] [address]       Core Measures:   · Discharge instructions:   · LVEF documented:   · ACEI for LV dysfunction:   · Smoking Cessation:

## 2022-07-15 ENCOUNTER — CARE COORDINATION (OUTPATIENT)
Dept: CASE MANAGEMENT | Age: 82
End: 2022-07-15

## 2022-07-15 NOTE — CARE COORDINATION
[address] Transitions Initial Follow Up Call    Call within 2 business days of discharge: Yes    Patient: Fransisco Hayden Patient : 1940   MRN: [id number]  Reason for Admission: Chest pain  Discharge Date: 22 RARS: Readmission Risk Score: 8.6 ( )      Last Discharge [facility]       Date Complaint Diagnosis Description Type Department Provider    22 Chest Pain Acute coronary syndrome [facility]) ED to Hosp-Admission (Discharged) (ADMITTED) F CVU Rubi Weiss MD; Lisbeth Garland . .. Spoke with: 1 [facility]: [facility]    Non-face-to-face services provided:  Obtained and reviewed discharge summary and/or continuity of care documents  Transitions of Care Initial Call    Was this an external facility discharge? No Discharge Facility: [facility]    Challenges to be reviewed by the provider   Additional needs identified to be addressed with provider: No  none             Method of communication with provider : none    Advance Care Planning:   Does patient have an Advance Directive: reviewed and current. LPN Care Coordinator contacted the patient by telephone to perform post hospital discharge assessment. Verified name and  with patient as identifiers. Provided introduction to self, and explanation of the LPN CC role. LPN CC reviewed discharge instructions, medical action plan and red flags with patient who verbalized understanding. Patient given an opportunity to ask questions and does not have any further questions or concerns at this time. Were discharge instructions available to patient? Yes. Reviewed appropriate site of care based on symptoms and resources available to patient including: PCP  Specialist  Urgent care clinics  When to call 911. The patient agrees to contact the PCP office for questions related to their healthcare. Medication reconciliation was performed with patient, who verbalizes understanding of administration of home medications.  Advised obtaining a 90-day supply of all daily and as-needed medications. Was patient discharged with a pulse oximeter? no    LPN CC provided contact information. Plan for follow-up call in 5-7 days based on severity of symptoms and risk factors. Plan for next call: follow up appointment-How did it go with PCP? Chest pain? This [facility] spoke with pt and pt stated that se is doing well. Patient denied any worsening symptoms. Denied fever, chills, N/V and any difficulty breathing at this time. Denied chest pain and SOB. Denied difficulty with urination, BMs or appetite. Medication review performed and patient verbalized understanding and is taking all medications as prescribed. Pt has a f/u with PCP on 22. Denied any needs and concerns at this time. Advised pt to immediately report any worsening symptoms to the PCP. Patient verbalized understanding and agreed. Aileen Bacon LPN, [facility]  PH: [phone]          Care Transitions 24 Hour Call    Do you have a copy of your discharge instructions?: Yes  Do you have support at home?: Alone  Are you an active caregiver in your home?: No  Care Transitions Interventions  No Identified Needs         Follow Up  Future Appointments   Date Time Provider Mallory Knight   2022 10:45 AM Rachana Beck MD [facility]   2022  9:15 AM America Thurman MD [facility]       Aileen Bacon LPN                    Care Transitions Outreach Attempt    Call within 2 business days of discharge: Yes   Attempted to reach patient for 1st attempt at 24 hr transitions of care follow up. Unable to reach patient. Left HIPPA Compliant VM.   Aileen Bacon LPN, [facility]: [phone]      Patient: Aditya Yaneth Patient : 1940 MRN: [id number]    Last Discharge [facility]       Date Complaint Diagnosis Description Type Department Provider    22 Chest Pain Acute coronary syndrome [facility]) ED to Hosp-Admission (Discharged) (ADMITTED) RENEE Bee Junior Anahi Velázquez MD; Mitesh Zendejas . .. Was this an external facility discharge?  No Discharge Facility: [facility]    Noted following upcoming appointments from discharge chart review:   [facility] follow up appointment(s):   Future Appointments   Date Time Provider Mallory Knight   7/22/2022 10:45 AM Rafael Magdaleno MD [facility]   8/19/2022  9:15 AM Gisell Martinez MD [facility]     Non-[facility] follow up appointment(s):

## 2022-07-22 ENCOUNTER — OFFICE VISIT (OUTPATIENT)
Dept: FAMILY MEDICINE CLINIC | Age: 82
End: 2022-07-22
Payer: MEDICARE

## 2022-07-22 ENCOUNTER — CARE COORDINATION (OUTPATIENT)
Dept: CASE MANAGEMENT | Age: 82
End: 2022-07-22

## 2022-07-22 VITALS
DIASTOLIC BLOOD PRESSURE: 80 MMHG | SYSTOLIC BLOOD PRESSURE: 134 MMHG | BODY MASS INDEX: 29.87 KG/M2 | HEIGHT: 73 IN | HEART RATE: 73 BPM | WEIGHT: 225.38 LBS

## 2022-07-22 DIAGNOSIS — I25.810 CORONARY ARTERY DISEASE INVOLVING CORONARY BYPASS GRAFT OF NATIVE HEART WITHOUT ANGINA PECTORIS: ICD-10-CM

## 2022-07-22 DIAGNOSIS — I10 HYPERTENSION, ESSENTIAL: Primary | ICD-10-CM

## 2022-07-22 PROCEDURE — 99213 OFFICE O/P EST LOW 20 MIN: CPT | Performed by: FAMILY MEDICINE

## 2022-07-22 PROCEDURE — 1036F TOBACCO NON-USER: CPT | Performed by: FAMILY MEDICINE

## 2022-07-22 PROCEDURE — G8417 CALC BMI ABV UP PARAM F/U: HCPCS | Performed by: FAMILY MEDICINE

## 2022-07-22 PROCEDURE — 1123F ACP DISCUSS/DSCN MKR DOCD: CPT | Performed by: FAMILY MEDICINE

## 2022-07-22 PROCEDURE — G8427 DOCREV CUR MEDS BY ELIG CLIN: HCPCS | Performed by: FAMILY MEDICINE

## 2022-07-22 PROCEDURE — 1111F DSCHRG MED/CURRENT MED MERGE: CPT | Performed by: FAMILY MEDICINE

## 2022-07-22 ASSESSMENT — PATIENT HEALTH QUESTIONNAIRE - PHQ9
SUM OF ALL RESPONSES TO PHQ QUESTIONS 1-9: 0
2. FEELING DOWN, DEPRESSED OR HOPELESS: 0
SUM OF ALL RESPONSES TO PHQ QUESTIONS 1-9: 0
SUM OF ALL RESPONSES TO PHQ9 QUESTIONS 1 & 2: 0
1. LITTLE INTEREST OR PLEASURE IN DOING THINGS: 0

## 2022-07-22 NOTE — CARE COORDINATION
flu-like symptoms. States his appetite is improving and he's having normal elimination patterns. He denies other questions or concerns at  this time. He has an appt with his PCP today.

## 2022-07-22 NOTE — PROGRESS NOTES
[address] Note    Date: 7/22/2022                                               Gabby Crease:     Chief Complaint   Patient presents with    Blood Pressure Check       HPI  Patient is here for blood pressure check. Currently takes metoprolol and lisinopril and HCTZ. Denies chest pain or shortness of breath. Pt had 3 stents placed on 7/13/22. Went home 3 days later. Pt feels well with no groin soreness.           Patient Active Problem List    Diagnosis Date Noted    Essential hypertension, benign 07/12/2011    CAD (coronary artery disease) 07/12/2011    Acute coronary syndrome ([address] 07/13/2022    Chronic renal disease, stage III [facility]) [237075] 07/08/2022    Status post AAA (abdominal aortic aneurysm) repair 11/10/2014    Chronic systolic heart failure ([address]     Syncope and collapse 11/22/2020    Sebaceous cyst     Vestibular dizziness involving left inner ear 08/10/2017    Hyperlipidemia 01/27/2017    Arthritis of right knee 09/15/2015    Obesity (BMI 30.0-34.9) 02/27/2015    Cholelithiasis 11/10/2014    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 ([address] 08/08/2014    Thoracic and lumbosacral neuritis 01/29/2014    Spinal stenosis of lumbar region 01/29/2014    Acquired spondylolisthesis 01/29/2014    Old MI (myocardial infarction) 07/12/2011    Abdominal aortic aneurysm (AAA) without rupture ([address] 07/12/2011       Past Medical History:   Diagnosis Date    Acquired spondylolisthesis 1/29/2014    Arthritis     diffuse    CAD (coronary artery disease)     S/P PCI and CABG    Carotid disease, bilateral ([address] 2/3/2014    Cholelithiasis 11/10/2014    Chronic systolic heart failure ([address]     Diabetes mellitus type II     Essential hypertension     Hyperlipidemia     Obesity (BMI 30.0-34.9) 2/27/2015    Prolonged emergence from general anesthesia     slow to wake up    Spinal stenosis of lumbar region 1/29/2014    Status post AAA (abdominal aortic aneurysm) repair 11/10/2014       Current Outpatient Medications   Medication Sig Dispense Refill    metoprolol tartrate (LOPRESSOR) 25 MG tablet Take 1 tablet by mouth 2 times daily 60 tablet 3    atorvastatin (LIPITOR) 80 MG tablet Take 1 tablet by mouth nightly 30 tablet 3    tamsulosin (FLOMAX) 0.4 MG capsule TAKE 1 CAPSULE EVERY DAY 90 capsule 1    metFORMIN (GLUCOPHAGE) 1000 MG tablet Take 1 tablet by mouth nightly 90 tablet 1    fluticasone (FLONASE) 50 MCG/ACT nasal spray 2 sprays by Each Nostril route daily 16 g 5    lisinopril (PRINIVIL;ZESTRIL) 20 MG tablet TAKE 1 TABLET EVERY DAY 90 tablet 3    hydroCHLOROthiazide (HYDRODIURIL) 25 MG tablet Take 25 mg by mouth daily      clopidogrel (PLAVIX) 75 MG tablet TAKE 1 TABLET EVERY DAY (NEED MD APPOINTMENT FOR MORE REFILLS) 90 tablet 3    TRUE METRIX BLOOD GLUCOSE TEST strip TEST DAILY AS NEEDED. 100 strip 5    Blood Glucose Calibration (ACCU-CHEK THOMAS) SOLN Use as needed for blood sugar checks 1 each 1    TRUEplus Lancets 33G MISC CHECK BLOOD SUGAR 4 TIMES PER DAY AS NEEDED 400 each 5    Blood Glucose Monitoring Suppl (TRUE METRIX AIR GLUCOSE METER) NATASHA Check blood sugar as needed 1 Device 0    Alcohol Swabs (B-D SINGLE USE SWABS REGULAR) PADS Use as directed 100 each 0    nitroGLYCERIN (NITROSTAT) 0.4 MG SL tablet Place 1 tablet under the tongue every 5 minutes as needed for Chest pain 25 tablet 3    aspirin 81 MG tablet Take 81 mg by mouth daily      ibuprofen (ADVIL;MOTRIN) 200 MG CAPS Take 2 capsules by mouth 3 times daily       No current facility-administered medications for this visit. Allergies   Allergen Reactions    Latex Swelling and Rash     Blisters. Pt reacts to a variety of products, including balloons, rubber bands, gloves, foam pillows, adhesive tape, ACE bandages.     Iodine      Contrast,       Review of Systems  No fever, no cough    Vitals:  /80   Pulse 73   Ht 6' 1\" (1.854 m)   Wt 225 lb 6 oz (102.2 kg)   BMI 29.73 kg/m²     Wt Readings from Last 3 Encounters: 07/22/22 225 lb 6 oz (102.2 kg)   07/14/22 228 lb 2.8 oz (103.5 kg)   07/08/22 225 lb (102.1 kg)        Physical Exam  General:  Well-appearing, NAD, alert, non-toxic  HEENT:  Normocephalic, atraumatic. Pupils equal and round. CHEST/LUNGS: CTAB, no crackles, no wheeze, no rhonchi. Symmetric rise  CARDIOVASCULAR: RRR,  no murmur, no rub  EXTREMETIES: Normal movement of all extremities  SKIN:  No rash, no cellulitis, no bruising, no petechiae/purpura/vesicles/pustules/abscess  PSYCH:  A+O x 3; normal affect  NEURO:  GCS 15, CN2-12 grossly intact, no focal motor/sensory deficits, no cerebellar deficits, normal gait, normal speech      Assessment/Plan     78year-old male with hypertension. Blood pressure is at goal.  Continue present management. Pt recently had recurrent MI and had stents placed. Is doing very well and is asymptomatic. CPM. F/u with cardiology as scheduled. Follow-up in 6 months for diabetic checkup. Discharged in stable condition at 11:06    1. Hypertension, essential     No orders of the defined types were placed in this encounter. No follow-ups on file.     Yuniel Shay MD, MD    7/22/2022  10:56 AM

## 2022-07-29 ENCOUNTER — CARE COORDINATION (OUTPATIENT)
Dept: CASE MANAGEMENT | Age: 82
End: 2022-07-29

## 2022-07-29 NOTE — CARE COORDINATION
[address] Transitions Follow Up Call    2022    Patient: Nancy Hernández  Patient : 1940   MRN: [id number]  Reason for Admission: CP; s/p stents  Discharge Date: 22 RARS: Readmission Risk Score: 8.6 ( )         Spoke with: Triston Cuello ANNITA Transitions Subsequent and Final Call    Subsequent and Final Calls  Do you have any ongoing symptoms?: No  Have your medications changed?: No  Do you have any questions related to your medications?: No  Do you currently have any active services?: No  Do you have any needs or concerns that I can assist you with?: No  Identified Barriers: None  Care Transitions Interventions  Other Interventions: Follow Up: Patient reports that he is doing well, denies any chest pain, SOB, palpitations, weakness. He is feeling better and will follow up with Dr. Mabel Gabriel 22. He is inquiring about when to begin cardiac rehab, CTN referred him to call Dr. Jil Mack office today and ask him. CTN confirmed that he does have the phone number and he will call today. CTN will continue with outreach follow up calls.     Future Appointments   Date Time Provider Mallory Knight   2022  9:15 AM Taisha Barnett MD [facility] Cardio AMBER Montoya RN

## 2022-07-29 NOTE — CARE COORDINATION
Care Transitions Follow Up Call    Needs to be reviewed by the provider   Additional needs identified to be addressed with provider: No  none             Method of communication with provider : none      Care Transition Nurse contacted the patient by telephone to follow up after admission. Verified name and  with patient as identifiers. Addressed changes since last contact: none  Discussed follow-up appointments. If no appointment was previously scheduled, appointment scheduling offered: NA. Is follow up appointment scheduled within 7 days of discharge? Agrees to schedule. CTN reviewed discharge instructions, medical action plan and red flags with patient and discussed any barriers to care and/or understanding of plan of care after discharge. Discussed appropriate site of care based on symptoms and resources available to patient including: PCP  Specialist  When to call 911. The patient agrees to contact the PCP office for questions related to their healthcare. Patients top risk factors for readmission: medical condition-   Interventions to address risk factors: Assessment and support for treatment adherence and medication management-     CTN provided contact information for future needs. Plan for follow-up call in 7-10 days based on severity of symptoms and risk factors. Plan for next call: self management-     States he's doing well. Denies SOB, CP, palpitations, or other symptoms or concerns. His cath site is clean and dry. Denies questions at this time. Agrees to schedule f.u appt.

## 2022-08-05 ENCOUNTER — CARE COORDINATION (OUTPATIENT)
Dept: CASE MANAGEMENT | Age: 82
End: 2022-08-05

## 2022-08-05 NOTE — CARE COORDINATION
Care Transitions Outreach Attempt    Call within 2 business days of discharge: Yes   Attempted to reach patient for transitions of care follow up. Unable to reach patient. Left HIPPA Compliant VM. Maya Gaytan  NATASHA, [facility]: [phone]      Patient: Nissa Serrano Patient : 1940 MRN: [id number]    Last Discharge [facility]       Date Complaint Diagnosis Description Type Department Provider    22 Chest Pain Acute coronary syndrome [facility]) ED to Hosp-Admission (Discharged) (ADMITTED) F CVICU Génesis Blackmon MD; Sivan Molina . .. Was this an external facility discharge?  No Discharge Facility: [facility]    Noted following upcoming appointments from discharge chart review:   [facility] follow up appointment(s):   Future Appointments   Date Time Provider Mallory Knight   2022  9:15 AM Justino Hampton MD [facility]     Non-[facility] follow up appointment(s):

## 2022-08-10 ENCOUNTER — CARE COORDINATION (OUTPATIENT)
Dept: CASE MANAGEMENT | Age: 82
End: 2022-08-10

## 2022-08-10 NOTE — CARE COORDINATION
This [facility] spoke with pt for final call and pt stated that he is doing well. Patient denied any worsening symptoms. Denied fever, chills, N/V and any difficulty breathing at this time. Pt stated that he is a little sore as he dug a hole for a neighbor for a pole placement. Writer advised pt that it is important that he not over exert. Denied chest pain and SOB, worsening edema and wt gain. Pt has slight edema in his right foot but not worsening and not new. Wt today is 220 lbs. Pt does not recall his last BS reading as he is not testing QID as ordered. Writer advised pt to do this as this is very important and pt agreed. Denied difficulty with urination, BMs or appetite. Denied any needs and concerns at this time. Advised pt to immediately report any worsening symptoms to the PCP. Patient verbalized understanding and agreed. Brian Simpson LPN, [facility]  PH: [email or web address] Transitions Follow Up Call    8/10/2022    Patient: Cisco Agrawal  Patient : 1940   MRN: [id number]  Reason for Admission: Chest pain  Discharge Date: 22 RARS: Readmission Risk Score: 8.6 ( )         Spoke with: [id number] Wilson Transitions Follow Up Call    Needs to be reviewed by the provider   Additional needs identified to be addressed with provider: No  none             Method of communication with provider : none      LPN Care Coordinator contacted the patient by telephone to follow up after admission on 22. Verified name and  with patient as identifiers. Addressed changes since last contact: none  Discussed follow-up appointments. If no appointment was previously scheduled, appointment scheduling offered: Yes. Is follow up appointment scheduled within 7 days of discharge? Yes. Advance Care Planning:   Does patient have an Advance Directive: reviewed and current.      LPN CC reviewed discharge instructions, medical action plan and red flags with patient and discussed any barriers to care and/or understanding of plan of care after discharge. Discussed appropriate site of care based on symptoms and resources available to patient including: PCP  Specialist  Urgent care clinics  When to call 911. The patient agrees to contact the PCP office for questions related to their healthcare. Patients top risk factors for readmission: ineffective coping  Interventions to address risk factors: Obtained and reviewed discharge summary and/or continuity of care documents      Non-[facility] follow up appointment(s):      provided contact information for future needs. No further follow-up call indicated based on severity of symptoms and risk factors. Plan for next call:  N/A          Care Transitions Subsequent and Final Call    Schedule Follow Up Appointment with PCP: Completed  Subsequent and Final Calls  Do you have any ongoing symptoms?: No  Have your medications changed?: No  Do you have any questions related to your medications?: No  Do you currently have any active services?: No  Do you have any needs or concerns that I can assist you with?: No  Identified Barriers: None  Care Transitions Interventions  CHF Nurse: Completed      Other Interventions:              Follow Up  Future Appointments   Date Time Provider Mallory Knight   8/19/2022  9:15 AM León Doll MD [facility]       Trey Mcclain LPN

## 2022-08-11 NOTE — PROGRESS NOTES
[address]   Cardiac Follow Up     Referring Provider:  Bertrand Thomas MD     Chief Complaint   Patient presents with    6 Month Follow-Up    Coronary Artery Disease    Hypertension    Hyperlipidemia        History of Present Illness:  Elias Ervin is a 78 y.o. male with a history of CAD/ s/p CABG 02/ hypertension/ and hyperlipidemia AAA s/p repair 2009 and carotid stenosis s/p LCEA 2006. ED 11/22/2020 with complaint of a syncopal episode. He had carpal tunnel surgery to his right hand the week prior. Patient states he woke up in the morning he went downstairs to make coffee. States he  made coffee was going to sit down in his chair when apparently he had a syncopal episode. Patient states he woke up on the ground, the chair was broken and he had a contusion to the back of his head. He just felt dizzy and then next found himself on the floor. He did not have any sweating or hot flashes. He was discharged home on 11/24/2020 with a 14 day holter monitor. Pt was admitted to hospital July 2022 for ACS. [facility] with grafts per Dr Naoma Mortimer on July 13, 2022. Today, he is here for follow up. Kassy Avila is here with his daughter. Pt denies exertional chest pain, MAGANA/PND, palpitations, light-headedness, edema. He feels mildly tired. He sometimes he gets out of breath easily. He takes the dog for a walk 3-4 times per day. He is taking a train ride to [place] around [facility]. Past Medical History:   has a past medical history of Acquired spondylolisthesis, Arthritis, CAD (coronary artery disease), Carotid disease, bilateral ([address] Cholelithiasis, Chronic systolic heart failure ([address] Diabetes mellitus type II, Essential hypertension, Hyperlipidemia, Obesity (BMI 30.0-34.9), Prolonged emergence from general anesthesia, Spinal stenosis of lumbar region, and Status post AAA (abdominal aortic aneurysm) repair.     Surgical History:   has a past surgical history that includes Umbilical hernia repair (1987); Hip Arthroplasty (Left, 1999); Carotid endarterectomy (Left, 2006); AAA repair, endovascular (2009); Coronary artery bypass graft (2002); Vasectomy (1975); cyst removal (Right, 1997); Thyroid surgery (2012); Retinal detachment surgery (Left, 2004); Coronary angioplasty with stent (2009); Rotator cuff repair (Left, 2/24/14); Inguinal hernia repair (Left, 1985); Elbow surgery (Left, 1984); Total knee arthroplasty (Right, 09/15/2015); other surgical history (03/06/2018); Revision total hip arthroplasty (Left, 9/23/2019); Carpal tunnel release (Right, 11/20/2020); and Carpal tunnel release (Left, 12/18/2020). Social History:   reports that he quit smoking about 51 years ago. His smoking use included cigarettes. He has a 45.00 pack-year smoking history. He has never used smokeless tobacco. He reports that he does not drink alcohol and does not use drugs. Family History:  family history includes Breast Cancer in his sister; Cancer in his brother; Heart Disease in his brother, father, and sister. Home Medications:  Prior to Admission medications    Medication Sig Start Date End Date Taking?  Authorizing Provider   metoprolol tartrate (LOPRESSOR) 25 MG tablet Take 1 tablet by mouth 2 times daily  Patient taking differently: Take 25 mg by mouth daily 7/14/22  Yes Leonardo Rivas MD   atorvastatin (LIPITOR) 80 MG tablet Take 1 tablet by mouth nightly 7/14/22  Yes Leonardo Rivas MD   tamsulosin (FLOMAX) 0.4 MG capsule TAKE 1 CAPSULE EVERY DAY 7/11/22  Yes Spenser Valentino MD   metFORMIN (GLUCOPHAGE) 1000 MG tablet Take 1 tablet by mouth nightly 7/8/22  Yes Spenser Valentino MD   lisinopril (PRINIVIL;ZESTRIL) 20 MG tablet TAKE 1 TABLET EVERY DAY 3/28/22  Yes Carla Benavides MD   clopidogrel (PLAVIX) 75 MG tablet TAKE 1 TABLET EVERY DAY (NEED MD APPOINTMENT FOR MORE REFILLS) 12/22/21  Yes Spenser Valentino MD   TRUE METRIX BLOOD GLUCOSE TEST strip TEST DAILY AS NEEDED. 8/19/21  Yes Spenser Valentino MD   Blood Glucose Calibration (ACCU-CHEK THOMAS) SOLN Use as needed for blood sugar checks 3/23/21  Yes Aleah Wilson MD   TRUEplus Lancets 33G MISC CHECK BLOOD SUGAR 4 TIMES PER DAY AS NEEDED 3/22/21  Yes Aleah Wilson MD   Blood Glucose Monitoring Suppl (TRUE METRIX AIR GLUCOSE METER) NATASHA Check blood sugar as needed 3/22/21  Yes Aleah Wilson MD   Alcohol Swabs (B-D SINGLE USE SWABS REGULAR) PADS Use as directed 3/22/21  Yes Aleah Wilson MD   nitroGLYCERIN (NITROSTAT) 0.4 MG SL tablet Place 1 tablet under the tongue every 5 minutes as needed for Chest pain 3/9/20  Yes Bernarda Linn MD   aspirin 81 MG tablet Take 81 mg by mouth daily   Yes Historical Provider, MD   ibuprofen (ADVIL;MOTRIN) 200 MG CAPS Take 2 capsules by mouth 3 times daily   Yes Historical Provider, MD   hydroCHLOROthiazide (HYDRODIURIL) 25 MG tablet Take 25 mg by mouth daily  Patient not taking: Reported on 8/19/2022    Historical Provider, MD        Allergies:  Latex and Iodine     Review of Systems:   Constitutional: there has been no unanticipated weight loss. There's been no change in energy level, sleep pattern, or activity level. Eyes: No visual changes or diplopia. No scleral icterus. ENT: No Headaches, hearing loss or vertigo. No mouth sores or sore throat. Cardiovascular: Reviewed in HPI  Respiratory: No cough or wheezing, no sputum production. No hematemesis. Gastrointestinal: No abdominal pain, appetite loss, blood in stools. No change in bowel or bladder habits. Genitourinary: No dysuria, trouble voiding, or hematuria. Musculoskeletal:  No gait disturbance, weakness or joint complaints. Integumentary: No rash or pruritis. Neurological: No headache, diplopia, change in muscle strength, numbness or tingling. No change in gait, balance, coordination, mood, affect, memory, mentation, behavior. Psychiatric: No anxiety, no depression. Endocrine: No malaise, fatigue or temperature intolerance.  No excessive thirst, fluid intake, or urination. No tremor. Hematologic/Lymphatic: No abnormal bruising or bleeding, blood clots or swollen lymph nodes. Allergic/Immunologic: No nasal congestion or hives. Physical Examination:    Vitals:    08/19/22 0914   BP: (!) 140/90   Pulse:    SpO2:           Wt Readings from Last 1 Encounters:   08/19/22 232 lb 8 oz (105.5 kg)       Constitutional and General Appearance: NAD  Skin:good turgor,intact without lesions  HEENT: EOMI ,normal  Neck:no JVD    Respiratory:  Normal excursion and expansion without use of accessory muscles  Resp Auscultation: Normal breath sounds without dullness  Cardiovascular: The apical impulses not displaced  Heart tones are crisp and normal  Cervical veins are not engorged  The carotid upstroke is normal in amplitude and contour without delay or bruit  Peripheral pulses are symmetrical and full  There is no clubbing, cyanosis of the extremities. No edema  Femoral Arteries: 2+ and equal  Pedal Pulses: 2+ and equal   Abdomen:  No masses or tenderness  Liver/Spleen: No Abnormalities Noted  Neurological/Psychiatric:  Alert and oriented in all spheres  Moves all extremities well  Exhibits normal gait balance and coordination  No abnormalities of mood, affect, memory, mentation, or behavior are noted    Assessment:    1. Coronary artery disease  ~s/p CABG 2002  [facility] with grafts per Dr Juan Miguel Sumner on July 13, 2022  Stable. RCA stent x 3  Denies chest pain, will get short of breath at times     2. Hypertension  Stable  Continue current medication regimen      Blood pressure (!) 140/90, pulse 79, height 6' 1\" (1.854 m), weight 232 lb 8 oz (105.5 kg), SpO2 99 %. BP standing, checked by me 146/90   At home, pt's BP was 130/83 with last check     3. Hyperlipidemia  stable  continue on Lipitor 80 mg daily    2/22/22  TG 94 HDL 45      4. AAA  S/p aortic stent graft 2009  Thoracic CT 1/14/16> Mild aneurysmal dilation ascending aorta 4.8 cm similar to prior study.     Followed per PCP      5. Carotid disease  Lt CEA 2006  Stable  Dopplers 2011> ERI 27-78%, LICA 3-91%    6. Syncope, h/o (likely due to medications)  denies further episodes   14 day Holter Monitor 11/24/20 to 12/09/20> Predominant rhythm is Sinus Rhythm. PAC 1%, PVC 4%. 17 episodes of atrial tachycardia with the longest being 13 beats @140 BPM. The fastest was 166 bpm. Also showed prolonged bigeminal PAC's. AVG HR was 71 bpm, Min HR 44 bpm, MAX . No symptoms were reported. Plan:    Cardiac test and lab results personally reviewed by me during this office visit and discussed. Continue same medications   Recommend going to cardiac rehab  [facility] Minors to travel to [place] in November  cbc, cmp, lipids  Continue risk factor modifications. Call for any change in symptoms, call to report any changes in shortness of breath or development of chest pain with activity. Follow up in 6 mos    I appreciate the opportunity of cooperating in the care of this individual.    Frantz Grigsby. Floyd Bishop M.D., [address] [facility]    Patient's problem list, medications, allergies, past medical, surgical, social and family histories were reviewed and updated as appropriate. Scribe's attestation: This note was scribed in the presence of Dr Floyd Bishop by David Antunez RN. The scribe's documentation has been prepared under my direction and personally reviewed by me in its entirety. I confirm that the note above accurately reflects all work, treatment, procedures, and medical decision making performed by me.

## 2022-08-19 ENCOUNTER — HOSPITAL ENCOUNTER (OUTPATIENT)
Age: 82
Discharge: HOME | End: 2022-08-19
Payer: MEDICARE

## 2022-08-19 ENCOUNTER — OFFICE VISIT (OUTPATIENT)
Dept: CARDIOLOGY CLINIC | Age: 82
End: 2022-08-19
Payer: MEDICARE

## 2022-08-19 VITALS
DIASTOLIC BLOOD PRESSURE: 90 MMHG | HEIGHT: 73 IN | BODY MASS INDEX: 30.81 KG/M2 | SYSTOLIC BLOOD PRESSURE: 140 MMHG | HEART RATE: 79 BPM | OXYGEN SATURATION: 99 % | WEIGHT: 232.5 LBS

## 2022-08-19 DIAGNOSIS — I25.10 CORONARY ARTERY DISEASE INVOLVING NATIVE CORONARY ARTERY OF NATIVE HEART, UNSPECIFIED WHETHER ANGINA PRESENT: Primary | ICD-10-CM

## 2022-08-19 DIAGNOSIS — E78.2 MIXED HYPERLIPIDEMIA: ICD-10-CM

## 2022-08-19 DIAGNOSIS — I25.10 CORONARY ARTERY DISEASE INVOLVING NATIVE CORONARY ARTERY OF NATIVE HEART, UNSPECIFIED WHETHER ANGINA PRESENT: ICD-10-CM

## 2022-08-19 DIAGNOSIS — I10 ESSENTIAL HYPERTENSION, BENIGN: ICD-10-CM

## 2022-08-19 DIAGNOSIS — Z98.890 STATUS POST AAA (ABDOMINAL AORTIC ANEURYSM) REPAIR: ICD-10-CM

## 2022-08-19 DIAGNOSIS — Z86.79 STATUS POST AAA (ABDOMINAL AORTIC ANEURYSM) REPAIR: ICD-10-CM

## 2022-08-19 LAB
A/G RATIO: 1.3 (ref 1.1–2.2)
ALBUMIN SERPL-MCNC: 4.1 G/DL (ref 3.4–5)
ALP BLD-CCNC: 102 U/L (ref 40–129)
ALT SERPL-CCNC: 11 U/L (ref 10–40)
ANION GAP SERPL CALCULATED.3IONS-SCNC: 10 MMOL/L (ref 3–16)
AST SERPL-CCNC: 18 U/L (ref 15–37)
BASOPHILS ABSOLUTE: 0 K/UL (ref 0–0.2)
BASOPHILS RELATIVE PERCENT: 1 %
BILIRUB SERPL-MCNC: 0.6 MG/DL (ref 0–1)
BUN BLDV-MCNC: 13 MG/DL (ref 7–20)
CALCIUM SERPL-MCNC: 9.6 MG/DL (ref 8.3–10.6)
CHLORIDE BLD-SCNC: 107 MMOL/L (ref 99–110)
CHOLESTEROL, FASTING: 214 MG/DL (ref 0–199)
CO2: 26 MMOL/L (ref 21–32)
CREAT SERPL-MCNC: 1.2 MG/DL (ref 0.8–1.3)
EOSINOPHILS ABSOLUTE: 0.1 K/UL (ref 0–0.6)
EOSINOPHILS RELATIVE PERCENT: 2.7 %
GFR AFRICAN AMERICAN: >60
GFR NON-AFRICAN AMERICAN: 58
GLUCOSE BLD-MCNC: 97 MG/DL (ref 70–99)
HCT VFR BLD CALC: 41 % (ref 40.5–52.5)
HDLC SERPL-MCNC: 43 MG/DL (ref 40–60)
HEMOGLOBIN: 14.1 G/DL (ref 13.5–17.5)
LDL CHOLESTEROL CALCULATED: 152 MG/DL
LYMPHOCYTES ABSOLUTE: 1.1 K/UL (ref 1–5.1)
LYMPHOCYTES RELATIVE PERCENT: 25.3 %
MCH RBC QN AUTO: 32.8 PG (ref 26–34)
MCHC RBC AUTO-ENTMCNC: 34.4 G/DL (ref 31–36)
MCV RBC AUTO: 95.4 FL (ref 80–100)
MONOCYTES ABSOLUTE: 0.3 K/UL (ref 0–1.3)
MONOCYTES RELATIVE PERCENT: 7.7 %
NEUTROPHILS ABSOLUTE: 2.7 K/UL (ref 1.7–7.7)
NEUTROPHILS RELATIVE PERCENT: 63.3 %
PDW BLD-RTO: 14.9 % (ref 12.4–15.4)
PLATELET # BLD: 141 K/UL (ref 135–450)
PMV BLD AUTO: 9.1 FL (ref 5–10.5)
POTASSIUM SERPL-SCNC: 4.7 MMOL/L (ref 3.5–5.1)
RBC # BLD: 4.3 M/UL (ref 4.2–5.9)
SODIUM BLD-SCNC: 143 MMOL/L (ref 136–145)
TOTAL PROTEIN: 7.2 G/DL (ref 6.4–8.2)
TRIGLYCERIDE, FASTING: 97 MG/DL (ref 0–150)
VLDLC SERPL CALC-MCNC: 19 MG/DL
WBC # BLD: 4.2 K/UL (ref 4–11)

## 2022-08-19 PROCEDURE — G8417 CALC BMI ABV UP PARAM F/U: HCPCS | Performed by: INTERNAL MEDICINE

## 2022-08-19 PROCEDURE — 80053 COMPREHEN METABOLIC PANEL: CPT

## 2022-08-19 PROCEDURE — 80061 LIPID PANEL: CPT

## 2022-08-19 PROCEDURE — 36415 COLL VENOUS BLD VENIPUNCTURE: CPT

## 2022-08-19 PROCEDURE — 99214 OFFICE O/P EST MOD 30 MIN: CPT | Performed by: INTERNAL MEDICINE

## 2022-08-19 PROCEDURE — G8427 DOCREV CUR MEDS BY ELIG CLIN: HCPCS | Performed by: INTERNAL MEDICINE

## 2022-08-19 PROCEDURE — 1036F TOBACCO NON-USER: CPT | Performed by: INTERNAL MEDICINE

## 2022-08-19 PROCEDURE — 1123F ACP DISCUSS/DSCN MKR DOCD: CPT | Performed by: INTERNAL MEDICINE

## 2022-08-19 PROCEDURE — 85025 COMPLETE CBC W/AUTO DIFF WBC: CPT

## 2022-08-19 RX ORDER — ATORVASTATIN CALCIUM 80 MG/1
80 TABLET, FILM COATED ORAL NIGHTLY
Qty: 30 TABLET | Refills: 3 | Status: SHIPPED | OUTPATIENT
Start: 2022-08-19 | End: 2022-10-24

## 2022-08-19 NOTE — PATIENT INSTRUCTIONS
Continue same medications   Recommend going to cardiac rehab  [id number] to travel to [place] in November  cbc, cmp, lipids (labs)  Continue risk factor modifications. Call for any change in symptoms, call to report any changes in shortness of breath or development of chest pain with activity.     Follow up in 6 mos

## 2022-08-22 RX ORDER — EZETIMIBE 10 MG/1
10 TABLET ORAL DAILY
Qty: 90 TABLET | Refills: 1 | Status: SHIPPED | OUTPATIENT
Start: 2022-08-22

## 2022-08-23 ENCOUNTER — TELEPHONE (OUTPATIENT)
Dept: CARDIOLOGY CLINIC | Age: 82
End: 2022-08-23

## 2022-08-23 ENCOUNTER — HOSPITAL ENCOUNTER (OUTPATIENT)
Dept: CARDIAC REHAB | Age: 82
Setting detail: THERAPIES SERIES
Discharge: HOME | End: 2022-08-23
Payer: MEDICARE

## 2022-08-23 VITALS
HEIGHT: 72 IN | HEART RATE: 94 BPM | WEIGHT: 232 LBS | DIASTOLIC BLOOD PRESSURE: 96 MMHG | BODY MASS INDEX: 31.42 KG/M2 | RESPIRATION RATE: 15 BRPM | SYSTOLIC BLOOD PRESSURE: 150 MMHG

## 2022-08-23 PROCEDURE — 93798 PHYS/QHP OP CAR RHAB W/ECG: CPT

## 2022-08-23 ASSESSMENT — PATIENT HEALTH QUESTIONNAIRE - PHQ9
1. LITTLE INTEREST OR PLEASURE IN DOING THINGS: 0
9. THOUGHTS THAT YOU WOULD BE BETTER OFF DEAD, OR OF HURTING YOURSELF: 0
10. IF YOU CHECKED OFF ANY PROBLEMS, HOW DIFFICULT HAVE THESE PROBLEMS MADE IT FOR YOU TO DO YOUR WORK, TAKE CARE OF THINGS AT HOME, OR GET ALONG WITH OTHER PEOPLE: 0
SUM OF ALL RESPONSES TO PHQ QUESTIONS 1-9: 2
SUM OF ALL RESPONSES TO PHQ QUESTIONS 1-9: 2
SUM OF ALL RESPONSES TO PHQ9 QUESTIONS 1 & 2: 0
5. POOR APPETITE OR OVEREATING: 1
SUM OF ALL RESPONSES TO PHQ QUESTIONS 1-9: 2
6. FEELING BAD ABOUT YOURSELF - OR THAT YOU ARE A FAILURE OR HAVE LET YOURSELF OR YOUR FAMILY DOWN: 0
3. TROUBLE FALLING OR STAYING ASLEEP: 0
SUM OF ALL RESPONSES TO PHQ QUESTIONS 1-9: 2
4. FEELING TIRED OR HAVING LITTLE ENERGY: 0
2. FEELING DOWN, DEPRESSED OR HOPELESS: 0
7. TROUBLE CONCENTRATING ON THINGS, SUCH AS READING THE NEWSPAPER OR WATCHING TELEVISION: 1
8. MOVING OR SPEAKING SO SLOWLY THAT OTHER PEOPLE COULD HAVE NOTICED. OR THE OPPOSITE, BEING SO FIGETY OR RESTLESS THAT YOU HAVE BEEN MOVING AROUND A LOT MORE THAN USUAL: 0

## 2022-08-23 NOTE — TELEPHONE ENCOUNTER
In order for script to go through [facility], [facility] must call the [employer] company at [phone] to cancel the script that was ordered yesterday. Gwen Keene from [facility] given above phone number and plans to call the mail order company to cancel it, so the script can be picked up today at [facility] instead.

## 2022-08-23 NOTE — TELEPHONE ENCOUNTER
Spoke to [employer] at [facility]. [employer] states Yuri Campbell already picked up script. He got a 7 day supply.

## 2022-08-23 NOTE — CARDIO/PULMONARY
[facility] Cardiac Rehabilitation Initial Evaluation    Whitfield Hashimoto       1940     [id number]    Share medical information:  Nitish Chase (daughter) [phone]    Cardiac History    CABG  PTCA Stent    Physical Assessment     General Appearance   Height:  6' (182.9 cm)  Weight:  232 lb (105.2 kg)   BMI:  31.5  Skin color:         Cardiovascular Assessment  BP Sitting:  (!) 150/96  Sitting:  160/108 (right upper arm)  Standin/94 (left upper arm)  Heart rate:   94 Telemetry   Heart sounds:          Respiratory Assessment  Resp rate: 15                  SpO2:     Quality/Effort:    unlabored  Sleep Apnea:  No  CPAP  No  Oxygen  No     Sleeping Habits:  8 hours sleep interrupted does not have too much trouble falling back to sleep    Edema:  Yes  2 + right ankle      Orthopedic/Exercise Limitations:  Yes shoulders, left hip, right knee, back    Pain:   Do you have pain?:  No       Fall Risk Assessment     History of falling with or without injury: No  Use of ambulatory aid: No  Difficulty walking/impaired gait: No  Numbness in feet: Yes (right foot only when lying in bed)  Vision changes: No  Dizziness: No  Shortness of breath: Yes (exertional)  Current medications include but not limited to: Anticoagulant, ACE, ARB, Beta  Blocker  Other fall risk : No  Outpatient fall risk intervention strategies: None of these strategies apply    Abuse / Neglect  Physical/behavioral signs of abuse/neglect   No    Do you feel safe at home   Yes    Advanced Directives  Patient has Advanced Directives:  Yes  Patient given Advanced Directive pack:  No    Vaccinations  Influenza (annual):  Yes  Pneumonia:  Yes  Covid, shingles  Pt here for first session of Cardiac Rehab. Reviewed and discussed insurance benefits, pt v/u. Reviewed Cardiac Rehab Routine and RPE scale, pt v/u. Developed indiviudual treatment plan and goals set with patient; pt in agreement with plan and no further questions at this time.  Six

## 2022-08-26 ENCOUNTER — HOSPITAL ENCOUNTER (OUTPATIENT)
Dept: CARDIAC REHAB | Age: 82
Setting detail: THERAPIES SERIES
Discharge: HOME | End: 2022-08-26
Payer: MEDICARE

## 2022-08-26 LAB
GLUCOSE BLD-MCNC: 128 MG/DL (ref 70–99)
GLUCOSE BLD-MCNC: 97 MG/DL (ref 70–99)
PERFORMED ON: ABNORMAL
PERFORMED ON: NORMAL

## 2022-08-26 PROCEDURE — 93798 PHYS/QHP OP CAR RHAB W/ECG: CPT

## 2022-08-29 ENCOUNTER — HOSPITAL ENCOUNTER (OUTPATIENT)
Dept: CARDIAC REHAB | Age: 82
Setting detail: THERAPIES SERIES
Discharge: HOME | End: 2022-08-29
Payer: MEDICARE

## 2022-08-29 LAB
GLUCOSE BLD-MCNC: 113 MG/DL (ref 70–99)
GLUCOSE BLD-MCNC: 117 MG/DL (ref 70–99)
PERFORMED ON: ABNORMAL
PERFORMED ON: ABNORMAL

## 2022-08-29 PROCEDURE — 93798 PHYS/QHP OP CAR RHAB W/ECG: CPT

## 2022-08-31 ENCOUNTER — HOSPITAL ENCOUNTER (OUTPATIENT)
Dept: CARDIAC REHAB | Age: 82
Setting detail: THERAPIES SERIES
Discharge: HOME | End: 2022-08-31
Payer: MEDICARE

## 2022-08-31 LAB
GLUCOSE BLD-MCNC: 112 MG/DL (ref 70–99)
GLUCOSE BLD-MCNC: 75 MG/DL (ref 70–99)
PERFORMED ON: ABNORMAL
PERFORMED ON: NORMAL

## 2022-08-31 PROCEDURE — 93798 PHYS/QHP OP CAR RHAB W/ECG: CPT

## 2022-09-02 ENCOUNTER — HOSPITAL ENCOUNTER (OUTPATIENT)
Dept: CARDIAC REHAB | Age: 82
Setting detail: THERAPIES SERIES
Discharge: HOME | End: 2022-09-02
Payer: MEDICARE

## 2022-09-02 ENCOUNTER — TELEPHONE (OUTPATIENT)
Dept: CARDIOLOGY CLINIC | Age: 82
End: 2022-09-02

## 2022-09-02 LAB
GLUCOSE BLD-MCNC: 112 MG/DL (ref 70–99)
GLUCOSE BLD-MCNC: 142 MG/DL (ref 70–99)
PERFORMED ON: ABNORMAL
PERFORMED ON: ABNORMAL

## 2022-09-02 PROCEDURE — 93798 PHYS/QHP OP CAR RHAB W/ECG: CPT

## 2022-09-02 NOTE — TELEPHONE ENCOUNTER
I had discussed with Dr. Emily Camacho earlier, then called April Tabor. She relayed to Mr. Saldanareymundolili Heath and sent him home. He knows to call the office if any symptoms develop.

## 2022-09-07 ENCOUNTER — HOSPITAL ENCOUNTER (OUTPATIENT)
Dept: CARDIAC REHAB | Age: 82
Setting detail: THERAPIES SERIES
End: 2022-09-07
Payer: MEDICARE

## 2022-09-09 ENCOUNTER — HOSPITAL ENCOUNTER (OUTPATIENT)
Dept: CARDIAC REHAB | Age: 82
Setting detail: THERAPIES SERIES
Discharge: HOME | End: 2022-09-09
Payer: MEDICARE

## 2022-09-09 ENCOUNTER — TELEPHONE (OUTPATIENT)
Dept: CARDIAC REHAB | Age: 82
End: 2022-09-09

## 2022-09-09 NOTE — TELEPHONE ENCOUNTER
Pt. Called and advised he would not be able to attend Cardiac rehab session today. Pt. States that his blood pressure is elevated.

## 2022-09-12 ENCOUNTER — HOSPITAL ENCOUNTER (OUTPATIENT)
Dept: CARDIAC REHAB | Age: 82
Setting detail: THERAPIES SERIES
Discharge: HOME | End: 2022-09-12
Payer: MEDICARE

## 2022-09-12 PROCEDURE — 93798 PHYS/QHP OP CAR RHAB W/ECG: CPT

## 2022-09-14 ENCOUNTER — HOSPITAL ENCOUNTER (OUTPATIENT)
Dept: CARDIAC REHAB | Age: 82
Setting detail: THERAPIES SERIES
Discharge: HOME | End: 2022-09-14
Payer: MEDICARE

## 2022-09-14 PROCEDURE — 93798 PHYS/QHP OP CAR RHAB W/ECG: CPT

## 2022-09-16 ENCOUNTER — HOSPITAL ENCOUNTER (OUTPATIENT)
Dept: CARDIAC REHAB | Age: 82
Setting detail: THERAPIES SERIES
Discharge: HOME | End: 2022-09-16
Payer: MEDICARE

## 2022-09-16 PROCEDURE — 93798 PHYS/QHP OP CAR RHAB W/ECG: CPT

## 2022-09-19 ENCOUNTER — HOSPITAL ENCOUNTER (OUTPATIENT)
Dept: CARDIAC REHAB | Age: 82
Setting detail: THERAPIES SERIES
Discharge: HOME | End: 2022-09-19
Payer: MEDICARE

## 2022-09-19 PROCEDURE — 93798 PHYS/QHP OP CAR RHAB W/ECG: CPT

## 2022-09-21 ENCOUNTER — HOSPITAL ENCOUNTER (OUTPATIENT)
Dept: CARDIAC REHAB | Age: 82
Setting detail: THERAPIES SERIES
Discharge: HOME | End: 2022-09-21
Payer: MEDICARE

## 2022-09-21 PROCEDURE — 93798 PHYS/QHP OP CAR RHAB W/ECG: CPT

## 2022-09-23 ENCOUNTER — HOSPITAL ENCOUNTER (OUTPATIENT)
Dept: CARDIAC REHAB | Age: 82
Setting detail: THERAPIES SERIES
Discharge: HOME | End: 2022-09-23
Payer: MEDICARE

## 2022-09-23 PROCEDURE — 93798 PHYS/QHP OP CAR RHAB W/ECG: CPT

## 2022-09-26 ENCOUNTER — HOSPITAL ENCOUNTER (OUTPATIENT)
Dept: CARDIAC REHAB | Age: 82
Setting detail: THERAPIES SERIES
Discharge: HOME | End: 2022-09-26
Payer: MEDICARE

## 2022-09-26 PROCEDURE — 93798 PHYS/QHP OP CAR RHAB W/ECG: CPT

## 2022-09-28 ENCOUNTER — HOSPITAL ENCOUNTER (OUTPATIENT)
Dept: CARDIAC REHAB | Age: 82
Setting detail: THERAPIES SERIES
Discharge: HOME | End: 2022-09-28
Payer: MEDICARE

## 2022-09-28 PROCEDURE — 93798 PHYS/QHP OP CAR RHAB W/ECG: CPT

## 2022-09-30 ENCOUNTER — HOSPITAL ENCOUNTER (OUTPATIENT)
Dept: CARDIAC REHAB | Age: 82
Setting detail: THERAPIES SERIES
Discharge: HOME | End: 2022-09-30
Payer: MEDICARE

## 2022-09-30 PROCEDURE — 93798 PHYS/QHP OP CAR RHAB W/ECG: CPT

## 2022-10-03 ENCOUNTER — HOSPITAL ENCOUNTER (OUTPATIENT)
Dept: CARDIAC REHAB | Age: 82
Setting detail: THERAPIES SERIES
Discharge: HOME | End: 2022-10-03
Payer: MEDICARE

## 2022-10-03 PROCEDURE — 93798 PHYS/QHP OP CAR RHAB W/ECG: CPT

## 2022-10-05 ENCOUNTER — HOSPITAL ENCOUNTER (OUTPATIENT)
Dept: CARDIAC REHAB | Age: 82
Setting detail: THERAPIES SERIES
Discharge: HOME | End: 2022-10-05
Payer: MEDICARE

## 2022-10-05 ENCOUNTER — TELEPHONE (OUTPATIENT)
Dept: CARDIAC REHAB | Age: 82
End: 2022-10-05

## 2022-10-07 ENCOUNTER — TELEPHONE (OUTPATIENT)
Dept: CARDIOLOGY CLINIC | Age: 82
End: 2022-10-07

## 2022-10-07 ENCOUNTER — HOSPITAL ENCOUNTER (OUTPATIENT)
Dept: CARDIAC REHAB | Age: 82
Setting detail: THERAPIES SERIES
Discharge: HOME | End: 2022-10-07
Payer: MEDICARE

## 2022-10-07 PROCEDURE — 93798 PHYS/QHP OP CAR RHAB W/ECG: CPT

## 2022-10-07 RX ORDER — ISOSORBIDE MONONITRATE 30 MG/1
30 TABLET, EXTENDED RELEASE ORAL DAILY
Qty: 30 TABLET | Refills: 3 | Status: SHIPPED | OUTPATIENT
Start: 2022-10-07 | End: 2022-10-10

## 2022-10-07 NOTE — TELEPHONE ENCOUNTER
Per LES, pt is to start Imdur 30 mg daily. Called and informed Jelena Goddard from cardiac rehab who is currently with [facility] .

## 2022-10-07 NOTE — TELEPHONE ENCOUNTER
Jelena Goddard called to speak with LES are the nurse. The Pt was having chest pain while exercising, /102.   Please advise

## 2022-10-10 ENCOUNTER — TELEPHONE (OUTPATIENT)
Dept: CARDIAC REHAB | Age: 82
End: 2022-10-10

## 2022-10-10 ENCOUNTER — HOSPITAL ENCOUNTER (OUTPATIENT)
Dept: CARDIAC REHAB | Age: 82
Setting detail: THERAPIES SERIES
Discharge: HOME | End: 2022-10-10
Payer: MEDICARE

## 2022-10-10 RX ORDER — ISOSORBIDE MONONITRATE 30 MG/1
30 TABLET, EXTENDED RELEASE ORAL DAILY
Qty: 90 TABLET | Refills: 3 | Status: SHIPPED | OUTPATIENT
Start: 2022-10-10

## 2022-10-12 ENCOUNTER — HOSPITAL ENCOUNTER (OUTPATIENT)
Dept: CARDIAC REHAB | Age: 82
Setting detail: THERAPIES SERIES
Discharge: HOME | End: 2022-10-12
Payer: MEDICARE

## 2022-10-14 ENCOUNTER — APPOINTMENT (OUTPATIENT)
Dept: CARDIAC REHAB | Age: 82
End: 2022-10-14
Payer: MEDICARE

## 2022-10-17 ENCOUNTER — HOSPITAL ENCOUNTER (OUTPATIENT)
Dept: CARDIAC REHAB | Age: 82
Setting detail: THERAPIES SERIES
Discharge: HOME | End: 2022-10-17
Payer: MEDICARE

## 2022-10-17 DIAGNOSIS — E11.59 TYPE 2 DIABETES MELLITUS WITH VASCULAR DISEASE (HCC): ICD-10-CM

## 2022-10-17 DIAGNOSIS — I25.810 CORONARY ARTERY DISEASE INVOLVING CORONARY BYPASS GRAFT OF NATIVE HEART: ICD-10-CM

## 2022-10-17 PROCEDURE — 93798 PHYS/QHP OP CAR RHAB W/ECG: CPT

## 2022-10-17 RX ORDER — CALCIUM CITRATE/VITAMIN D3 200MG-6.25
TABLET ORAL
Qty: 100 STRIP | Refills: 5 | Status: SHIPPED | OUTPATIENT
Start: 2022-10-17

## 2022-10-17 RX ORDER — CLOPIDOGREL BISULFATE 75 MG/1
TABLET ORAL
Qty: 90 TABLET | Refills: 3 | Status: SHIPPED | OUTPATIENT
Start: 2022-10-17

## 2022-10-17 NOTE — TELEPHONE ENCOUNTER
Medication:   Requested Prescriptions     Pending Prescriptions Disp Refills    clopidogrel (PLAVIX) 75 MG tablet [Pharmacy Med Name: CLOPIDOGREL 75 MG Tablet] 90 tablet 3     Sig: TAKE 1 TABLET EVERY DAY (NEED MD APPOINTMENT FOR MORE REFILLS)    TRUE METRIX BLOOD GLUCOSE TEST strip [Pharmacy Med Name: TRUE METRIX SELF MONITORING BLOOD GLUCOSE STRIPS   Strip] 100 strip 5     Sig: TEST DAILY AS NEEDED. Last Filled: 12/22/2021, 90, 3                      8/19/2021, 100, 5     Patient Phone Number: [phone] (home)     Last appt: 7/22/2022   Next appt: Visit date not found    Last OARRS: No flowsheet data found.

## 2022-10-17 NOTE — TELEPHONE ENCOUNTER
Medication:   Requested Prescriptions     Pending Prescriptions Disp Refills    metFORMIN (GLUCOPHAGE) 1000 MG tablet 90 tablet 1     Sig: Take 1 tablet by mouth nightly        Last Filled:  7/8/2022, 90, 1    Patient Phone Number: [phone] (home)     Last appt: 7/22/2022   Next appt: 10/17/2022    Last OARRS: No flowsheet data found.

## 2022-10-17 NOTE — TELEPHONE ENCOUNTER
Medication and Quantity requested:   metFORMIN (GLUCOPHAGE) 1000 MG tablet   QTY: 180     Last Visit: 07/22/2022       Pharmacy and phone number updated in [facility]:  [facility]       The fax says prescriber name was Jefry Chin

## 2022-10-19 ENCOUNTER — HOSPITAL ENCOUNTER (OUTPATIENT)
Dept: CARDIAC REHAB | Age: 82
Setting detail: THERAPIES SERIES
Discharge: HOME | End: 2022-10-19
Payer: MEDICARE

## 2022-10-19 PROCEDURE — 93798 PHYS/QHP OP CAR RHAB W/ECG: CPT

## 2022-10-21 ENCOUNTER — HOSPITAL ENCOUNTER (OUTPATIENT)
Dept: CARDIAC REHAB | Age: 82
Setting detail: THERAPIES SERIES
Discharge: HOME | End: 2022-10-21
Payer: MEDICARE

## 2022-10-21 PROCEDURE — 93798 PHYS/QHP OP CAR RHAB W/ECG: CPT

## 2022-10-24 ENCOUNTER — HOSPITAL ENCOUNTER (OUTPATIENT)
Dept: CARDIAC REHAB | Age: 82
Setting detail: THERAPIES SERIES
Discharge: HOME | End: 2022-10-24
Payer: MEDICARE

## 2022-10-24 PROCEDURE — 93798 PHYS/QHP OP CAR RHAB W/ECG: CPT

## 2022-10-24 RX ORDER — ATORVASTATIN CALCIUM 80 MG/1
TABLET, FILM COATED ORAL
Qty: 90 TABLET | Refills: 3 | Status: SHIPPED | OUTPATIENT
Start: 2022-10-24

## 2022-10-24 NOTE — TELEPHONE ENCOUNTER
Received refill request for Atorvastatin from [employer].     Last ov: 08/19/2022 LES    Last labs: 08/19/2022     Last Refill: 08/19/2022 #30 w/ 3 refills    Next appointment: 02/15/2023 LES (RECALL)

## 2022-10-26 ENCOUNTER — HOSPITAL ENCOUNTER (OUTPATIENT)
Dept: CARDIAC REHAB | Age: 82
Setting detail: THERAPIES SERIES
Discharge: HOME | End: 2022-10-26
Payer: MEDICARE

## 2022-10-26 PROCEDURE — 93798 PHYS/QHP OP CAR RHAB W/ECG: CPT

## 2022-10-28 ENCOUNTER — HOSPITAL ENCOUNTER (OUTPATIENT)
Dept: CARDIAC REHAB | Age: 82
Setting detail: THERAPIES SERIES
Discharge: HOME | End: 2022-10-28
Payer: MEDICARE

## 2022-10-28 PROCEDURE — 93798 PHYS/QHP OP CAR RHAB W/ECG: CPT

## 2022-10-31 ENCOUNTER — HOSPITAL ENCOUNTER (OUTPATIENT)
Dept: CARDIAC REHAB | Age: 82
Setting detail: THERAPIES SERIES
Discharge: HOME | End: 2022-10-31
Payer: MEDICARE

## 2022-10-31 ENCOUNTER — IMMUNIZATION (OUTPATIENT)
Dept: FAMILY MEDICINE CLINIC | Age: 82
End: 2022-10-31
Payer: MEDICARE

## 2022-10-31 DIAGNOSIS — Z23 NEEDS FLU SHOT: Primary | ICD-10-CM

## 2022-10-31 PROCEDURE — G0008 ADMIN INFLUENZA VIRUS VAC: HCPCS | Performed by: FAMILY MEDICINE

## 2022-10-31 PROCEDURE — 93798 PHYS/QHP OP CAR RHAB W/ECG: CPT

## 2022-10-31 PROCEDURE — 90694 VACC AIIV4 NO PRSRV 0.5ML IM: CPT | Performed by: FAMILY MEDICINE

## 2022-11-02 ENCOUNTER — HOSPITAL ENCOUNTER (OUTPATIENT)
Dept: CARDIAC REHAB | Age: 82
Setting detail: THERAPIES SERIES
Discharge: HOME | End: 2022-11-02
Payer: MEDICARE

## 2022-11-02 PROCEDURE — 93798 PHYS/QHP OP CAR RHAB W/ECG: CPT

## 2022-11-04 ENCOUNTER — HOSPITAL ENCOUNTER (OUTPATIENT)
Dept: CARDIAC REHAB | Age: 82
Setting detail: THERAPIES SERIES
Discharge: HOME | End: 2022-11-04
Payer: MEDICARE

## 2022-11-04 PROCEDURE — 93798 PHYS/QHP OP CAR RHAB W/ECG: CPT

## 2022-11-07 ENCOUNTER — HOSPITAL ENCOUNTER (OUTPATIENT)
Dept: CARDIAC REHAB | Age: 82
Setting detail: THERAPIES SERIES
Discharge: HOME | End: 2022-11-07
Payer: MEDICARE

## 2022-11-07 PROCEDURE — 93798 PHYS/QHP OP CAR RHAB W/ECG: CPT

## 2022-11-09 ENCOUNTER — HOSPITAL ENCOUNTER (OUTPATIENT)
Dept: CARDIAC REHAB | Age: 82
Setting detail: THERAPIES SERIES
Discharge: HOME | End: 2022-11-09
Payer: MEDICARE

## 2022-11-09 PROCEDURE — 93798 PHYS/QHP OP CAR RHAB W/ECG: CPT

## 2022-11-11 ENCOUNTER — HOSPITAL ENCOUNTER (OUTPATIENT)
Dept: CARDIAC REHAB | Age: 82
Setting detail: THERAPIES SERIES
Discharge: HOME | End: 2022-11-11
Payer: MEDICARE

## 2022-11-11 PROCEDURE — 93798 PHYS/QHP OP CAR RHAB W/ECG: CPT

## 2022-11-14 ENCOUNTER — HOSPITAL ENCOUNTER (OUTPATIENT)
Dept: CARDIAC REHAB | Age: 82
Setting detail: THERAPIES SERIES
Discharge: HOME | End: 2022-11-14
Payer: MEDICARE

## 2022-11-14 PROCEDURE — 93798 PHYS/QHP OP CAR RHAB W/ECG: CPT

## 2022-12-12 ENCOUNTER — HOSPITAL ENCOUNTER (OUTPATIENT)
Dept: CARDIAC REHAB | Age: 82
Setting detail: THERAPIES SERIES
Discharge: HOME | End: 2022-12-12
Payer: MEDICARE

## 2022-12-12 PROCEDURE — 93798 PHYS/QHP OP CAR RHAB W/ECG: CPT

## 2022-12-14 ENCOUNTER — HOSPITAL ENCOUNTER (OUTPATIENT)
Dept: CARDIAC REHAB | Age: 82
Setting detail: THERAPIES SERIES
Discharge: HOME | End: 2022-12-14
Payer: MEDICARE

## 2022-12-14 PROCEDURE — 93798 PHYS/QHP OP CAR RHAB W/ECG: CPT

## 2022-12-16 ENCOUNTER — HOSPITAL ENCOUNTER (OUTPATIENT)
Dept: CARDIAC REHAB | Age: 82
Setting detail: THERAPIES SERIES
Discharge: HOME | End: 2022-12-16
Payer: MEDICARE

## 2022-12-16 PROCEDURE — 93798 PHYS/QHP OP CAR RHAB W/ECG: CPT

## 2022-12-19 ENCOUNTER — HOSPITAL ENCOUNTER (OUTPATIENT)
Dept: CARDIAC REHAB | Age: 82
Setting detail: THERAPIES SERIES
Discharge: HOME | End: 2022-12-19
Payer: MEDICARE

## 2022-12-19 PROCEDURE — 93798 PHYS/QHP OP CAR RHAB W/ECG: CPT

## 2022-12-21 ENCOUNTER — HOSPITAL ENCOUNTER (OUTPATIENT)
Dept: CARDIAC REHAB | Age: 82
Setting detail: THERAPIES SERIES
Discharge: HOME | End: 2022-12-21
Payer: MEDICARE

## 2022-12-21 PROCEDURE — 93798 PHYS/QHP OP CAR RHAB W/ECG: CPT

## 2022-12-22 ENCOUNTER — HOSPITAL ENCOUNTER (OUTPATIENT)
Dept: CARDIAC REHAB | Age: 82
Setting detail: THERAPIES SERIES
Discharge: HOME | End: 2022-12-22
Payer: MEDICARE

## 2022-12-22 PROCEDURE — 93798 PHYS/QHP OP CAR RHAB W/ECG: CPT

## 2022-12-23 ENCOUNTER — HOSPITAL ENCOUNTER (OUTPATIENT)
Dept: CARDIAC REHAB | Age: 82
Setting detail: THERAPIES SERIES
Discharge: HOME | End: 2022-12-23
Payer: MEDICARE

## 2023-02-01 DIAGNOSIS — E78.2 MIXED HYPERLIPIDEMIA: Primary | ICD-10-CM

## 2023-02-02 RX ORDER — EZETIMIBE 10 MG/1
TABLET ORAL
Qty: 90 TABLET | Refills: 0 | Status: SHIPPED | OUTPATIENT
Start: 2023-02-02

## 2023-02-02 NOTE — TELEPHONE ENCOUNTER
Spoke w/ pt and let him know he is due for lipids and CMP. Orders placed, he will come to [facility] to have drawn.  He is due for OV in February

## 2023-02-02 NOTE — TELEPHONE ENCOUNTER
Last OV: 8/19/22  Last labs: 2/22/22  Appt scheduled :  none , on RC for Dg nothing available till Jonn

## 2023-02-06 ENCOUNTER — HOSPITAL ENCOUNTER (OUTPATIENT)
Age: 83
Discharge: HOME | End: 2023-02-06
Payer: MEDICARE

## 2023-02-06 DIAGNOSIS — E78.2 MIXED HYPERLIPIDEMIA: ICD-10-CM

## 2023-02-06 LAB
A/G RATIO: 1.4 (ref 1.1–2.2)
ALBUMIN SERPL-MCNC: 4 G/DL (ref 3.4–5)
ALP BLD-CCNC: 123 U/L (ref 40–129)
ALT SERPL-CCNC: 15 U/L (ref 10–40)
ANION GAP SERPL CALCULATED.3IONS-SCNC: 9 MMOL/L (ref 3–16)
AST SERPL-CCNC: 17 U/L (ref 15–37)
BILIRUB SERPL-MCNC: 0.6 MG/DL (ref 0–1)
BUN BLDV-MCNC: 13 MG/DL (ref 7–20)
CALCIUM SERPL-MCNC: 9.6 MG/DL (ref 8.3–10.6)
CHLORIDE BLD-SCNC: 105 MMOL/L (ref 99–110)
CHOLESTEROL, TOTAL: 119 MG/DL (ref 0–199)
CO2: 29 MMOL/L (ref 21–32)
CREAT SERPL-MCNC: 1.2 MG/DL (ref 0.8–1.3)
GFR SERPL CREATININE-BSD FRML MDRD: 59 ML/MIN/{1.73_M2}
GLUCOSE BLD-MCNC: 122 MG/DL (ref 70–99)
HDLC SERPL-MCNC: 39 MG/DL (ref 40–60)
LDL CHOLESTEROL CALCULATED: 61 MG/DL
POTASSIUM SERPL-SCNC: 4.4 MMOL/L (ref 3.5–5.1)
SODIUM BLD-SCNC: 143 MMOL/L (ref 136–145)
TOTAL PROTEIN: 6.9 G/DL (ref 6.4–8.2)
TRIGL SERPL-MCNC: 94 MG/DL (ref 0–150)
VLDLC SERPL CALC-MCNC: 19 MG/DL

## 2023-02-06 PROCEDURE — 80061 LIPID PANEL: CPT

## 2023-02-06 PROCEDURE — 80053 COMPREHEN METABOLIC PANEL: CPT

## 2023-02-06 PROCEDURE — 36415 COLL VENOUS BLD VENIPUNCTURE: CPT

## 2023-02-07 ENCOUNTER — TELEPHONE (OUTPATIENT)
Dept: CARDIOLOGY CLINIC | Age: 83
End: 2023-02-07

## 2023-02-07 RX ORDER — LISINOPRIL 20 MG/1
TABLET ORAL
Qty: 90 TABLET | Refills: 3 | Status: SHIPPED | OUTPATIENT
Start: 2023-02-07

## 2023-02-07 NOTE — TELEPHONE ENCOUNTER
Received refill request for Lisinopril from [facility].     Last ov: 08/19/2022 LES    Last labs: 02/07/2023 CMP    Last Refill: 03/28/2022 #90 w/ 3 refill    Next appointment: 02/15/2023 LES (recall)

## 2023-02-28 RX ORDER — TAMSULOSIN HYDROCHLORIDE 0.4 MG/1
CAPSULE ORAL
Qty: 90 CAPSULE | Refills: 1 | Status: SHIPPED | OUTPATIENT
Start: 2023-02-28
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
## 2023-03-03 NOTE — TELEPHONE ENCOUNTER
----- Message from Todd Jennings MD sent at 11/16/2020  7:50 AM EST -----  Cbc is good [General Appearance - Alert] : alert [General Appearance - In No Acute Distress] : in no acute distress [General Appearance - Well Nourished] : well nourished [Sclera] : the sclera and conjunctiva were normal [PERRL With Normal Accommodation] : pupils were equal in size, round, and reactive to light [Extraocular Movements] : extraocular movements were intact [Outer Ear] : the ears and nose were normal in appearance [Oropharynx] : the oropharynx was normal [Neck Appearance] : the appearance of the neck was normal [] : no respiratory distress [Auscultation Breath Sounds / Voice Sounds] : lungs were clear to auscultation bilaterally [Heart Rate And Rhythm] : heart rate was normal and rhythm regular [Heart Sounds] : normal S1 and S2 [Murmurs] : no murmurs [Edema] : there was no peripheral edema [No Focal Deficits] : no focal deficits [Oriented To Time, Place, And Person] : oriented to person, place, and time [Impaired Insight] : insight and judgment were intact [Affect] : the affect was normal [Neck Cervical Mass (___cm)] : no neck mass was observed [No CVA Tenderness] : no ~M costovertebral angle tenderness [No Spinal Tenderness] : no spinal tenderness [Abnormal Walk] : normal gait [Nail Clubbing] : no clubbing  or cyanosis of the fingernails [Musculoskeletal - Swelling] : no joint swelling seen [Motor Tone] : muscle strength and tone were normal [FreeTextEntry1] : Nonspecific, scaly rash over B/L elbows

## 2023-03-06 ENCOUNTER — OFFICE VISIT (OUTPATIENT)
Dept: CARDIOLOGY CLINIC | Age: 83
End: 2023-03-06
Payer: MEDICARE

## 2023-03-06 VITALS
HEART RATE: 68 BPM | BODY MASS INDEX: 33.18 KG/M2 | DIASTOLIC BLOOD PRESSURE: 92 MMHG | OXYGEN SATURATION: 99 % | HEIGHT: 72 IN | SYSTOLIC BLOOD PRESSURE: 146 MMHG | WEIGHT: 245 LBS

## 2023-03-06 DIAGNOSIS — R55 SYNCOPE AND COLLAPSE: ICD-10-CM

## 2023-03-06 DIAGNOSIS — Z98.890 STATUS POST AAA (ABDOMINAL AORTIC ANEURYSM) REPAIR: ICD-10-CM

## 2023-03-06 DIAGNOSIS — R06.02 SOB (SHORTNESS OF BREATH): ICD-10-CM

## 2023-03-06 DIAGNOSIS — I25.10 CORONARY ARTERY DISEASE INVOLVING NATIVE CORONARY ARTERY OF NATIVE HEART, UNSPECIFIED WHETHER ANGINA PRESENT: ICD-10-CM

## 2023-03-06 DIAGNOSIS — Z86.79 STATUS POST AAA (ABDOMINAL AORTIC ANEURYSM) REPAIR: ICD-10-CM

## 2023-03-06 DIAGNOSIS — I65.23 BILATERAL CAROTID ARTERY STENOSIS: ICD-10-CM

## 2023-03-06 DIAGNOSIS — I10 ESSENTIAL HYPERTENSION, BENIGN: Primary | ICD-10-CM

## 2023-03-06 DIAGNOSIS — E78.2 MIXED HYPERLIPIDEMIA: ICD-10-CM

## 2023-03-06 PROCEDURE — 1036F TOBACCO NON-USER: CPT | Performed by: INTERNAL MEDICINE

## 2023-03-06 PROCEDURE — 99214 OFFICE O/P EST MOD 30 MIN: CPT | Performed by: INTERNAL MEDICINE

## 2023-03-06 PROCEDURE — G8427 DOCREV CUR MEDS BY ELIG CLIN: HCPCS | Performed by: INTERNAL MEDICINE

## 2023-03-06 PROCEDURE — 1123F ACP DISCUSS/DSCN MKR DOCD: CPT | Performed by: INTERNAL MEDICINE

## 2023-03-06 PROCEDURE — 3080F DIAST BP >= 90 MM HG: CPT | Performed by: INTERNAL MEDICINE

## 2023-03-06 PROCEDURE — G8417 CALC BMI ABV UP PARAM F/U: HCPCS | Performed by: INTERNAL MEDICINE

## 2023-03-06 PROCEDURE — 3077F SYST BP >= 140 MM HG: CPT | Performed by: INTERNAL MEDICINE

## 2023-03-06 PROCEDURE — G8484 FLU IMMUNIZE NO ADMIN: HCPCS | Performed by: INTERNAL MEDICINE

## 2023-03-06 RX ORDER — LISINOPRIL 20 MG/1
20 TABLET ORAL 2 TIMES DAILY
Qty: 180 TABLET | Refills: 3 | Status: SHIPPED | OUTPATIENT
Start: 2023-03-06

## 2023-03-06 NOTE — PATIENT INSTRUCTIONS
Echocardiogram  Lexiscan stress test  20 mg lisinopril twice per day  Continue risk factor modifications. Call for any change in symptoms, call to report any changes in shortness of breath or development of chest pain with activity.     Follow up in 6mos

## 2023-03-20 DIAGNOSIS — E11.59 TYPE 2 DIABETES MELLITUS WITH VASCULAR DISEASE (HCC): ICD-10-CM

## 2023-05-05 ENCOUNTER — HOSPITAL ENCOUNTER (OUTPATIENT)
Dept: NON INVASIVE DIAGNOSTICS | Age: 83
Discharge: HOME | End: 2023-05-05
Payer: MEDICARE

## 2023-05-05 DIAGNOSIS — R06.02 SOB (SHORTNESS OF BREATH): ICD-10-CM

## 2023-05-05 DIAGNOSIS — E78.2 MIXED HYPERLIPIDEMIA: ICD-10-CM

## 2023-05-05 DIAGNOSIS — I25.10 CORONARY ARTERY DISEASE INVOLVING NATIVE CORONARY ARTERY OF NATIVE HEART, UNSPECIFIED WHETHER ANGINA PRESENT: ICD-10-CM

## 2023-05-05 DIAGNOSIS — I10 ESSENTIAL HYPERTENSION, BENIGN: ICD-10-CM

## 2023-05-05 LAB
LV EF: 43 %
LV EF: 43 %
LVEF MODALITY: NORMAL
LVEF MODALITY: NORMAL

## 2023-05-05 PROCEDURE — 78452 HT MUSCLE IMAGE SPECT MULT: CPT | Performed by: INTERNAL MEDICINE

## 2023-05-05 PROCEDURE — 3430000000 HC RX DIAGNOSTIC RADIOPHARMACEUTICAL: Performed by: INTERNAL MEDICINE

## 2023-05-05 PROCEDURE — A9502 TC99M TETROFOSMIN: HCPCS | Performed by: INTERNAL MEDICINE

## 2023-05-05 PROCEDURE — 6360000002 HC RX W HCPCS: Performed by: INTERNAL MEDICINE

## 2023-05-05 PROCEDURE — C8929 TTE W OR WO FOL WCON,DOPPLER: HCPCS

## 2023-05-05 PROCEDURE — 93017 CV STRESS TEST TRACING ONLY: CPT | Performed by: INTERNAL MEDICINE

## 2023-05-05 PROCEDURE — 6360000004 HC RX CONTRAST MEDICATION: Performed by: INTERNAL MEDICINE

## 2023-05-05 RX ADMIN — REGADENOSON 0.4 MG: 0.08 INJECTION, SOLUTION INTRAVENOUS at 09:41

## 2023-05-05 RX ADMIN — TETROFOSMIN 30 MILLICURIE: 1.38 INJECTION, POWDER, LYOPHILIZED, FOR SOLUTION INTRAVENOUS at 09:44

## 2023-05-05 RX ADMIN — TETROFOSMIN 10 MILLICURIE: 1.38 INJECTION, POWDER, LYOPHILIZED, FOR SOLUTION INTRAVENOUS at 07:57

## 2023-05-05 RX ADMIN — PERFLUTREN 1.5 ML: 6.52 INJECTION, SUSPENSION INTRAVENOUS at 08:00

## 2023-05-05 NOTE — PROGRESS NOTES
Instructed on Lexiscan Stress Test Procedure including possible side effects/ adverse reactions. Patient verbalizes  understanding and denies having any questions . See [address] [place] [address] Cardiology

## 2023-05-08 ENCOUNTER — TELEPHONE (OUTPATIENT)
Dept: CARDIOLOGY CLINIC | Age: 83
End: 2023-05-08

## 2023-05-08 NOTE — TELEPHONE ENCOUNTER
----- Message from Alton Thrasher MD sent at 5/8/2023  1:10 PM EDT -----  Let him know stress testing and echo looks good so no changes or further testing needed

## 2023-07-06 RX ORDER — EZETIMIBE 10 MG/1
TABLET ORAL
Qty: 90 TABLET | Refills: 0 | Status: SHIPPED | OUTPATIENT
Start: 2023-07-06

## 2023-07-06 NOTE — TELEPHONE ENCOUNTER
Received refill request for zetia from [address] [place] [address].     Last ov: 03/06/2023 LES    Last Refill: 02/02/2023 #90 w/ 0 refills    Next appointment: 09/05/2023 LES

## 2023-08-02 ENCOUNTER — TELEPHONE (OUTPATIENT)
Dept: FAMILY MEDICINE CLINIC | Age: 83
End: 2023-08-02

## 2023-08-02 NOTE — TELEPHONE ENCOUNTER
----- Message from Sharita Alfaro sent at 8/2/2023 11:56 AM EDT -----  Subject: Message to Provider    QUESTIONS  Information for Provider? Pt is wanting someone to call him and let him   know if he is in need of any vaccines at this time  ---------------------------------------------------------------------------  --------------  [address]  [phone]; OK to leave message on voicemail  ---------------------------------------------------------------------------  --------------  SCRIPT ANSWERS  Relationship to Patient?  Self

## 2023-08-03 ENCOUNTER — NURSE ONLY (OUTPATIENT)
Dept: FAMILY MEDICINE CLINIC | Age: 83
End: 2023-08-03

## 2023-08-03 DIAGNOSIS — E11.59 TYPE 2 DIABETES MELLITUS WITH VASCULAR DISEASE (HCC): Primary | ICD-10-CM

## 2023-08-15 NOTE — PROGRESS NOTES
Aleksandr Moss M.D., [facility] [place]      Patient's problem list, medications, allergies, past medical, surgical, social and family histories were reviewed and updated as appropriate. Scribe's attestation: This note was scribed in the presence of Dr Ela Andres by May Oppenheim, RN. The scribe's documentation has been prepared under my direction and personally reviewed by me in its entirety. I confirm that the note above accurately reflects all work, treatment, procedures, and medical decision making performed by me.

## 2023-08-23 DIAGNOSIS — E11.59 TYPE 2 DIABETES MELLITUS WITH VASCULAR DISEASE (HCC): ICD-10-CM

## 2023-09-05 ENCOUNTER — OFFICE VISIT (OUTPATIENT)
Dept: CARDIOLOGY CLINIC | Age: 83
End: 2023-09-05
Payer: MEDICARE

## 2023-09-05 VITALS
HEIGHT: 72 IN | DIASTOLIC BLOOD PRESSURE: 84 MMHG | SYSTOLIC BLOOD PRESSURE: 166 MMHG | OXYGEN SATURATION: 98 % | BODY MASS INDEX: 32.51 KG/M2 | WEIGHT: 240 LBS | HEART RATE: 60 BPM

## 2023-09-05 DIAGNOSIS — R55 SYNCOPE AND COLLAPSE: ICD-10-CM

## 2023-09-05 DIAGNOSIS — E78.2 MIXED HYPERLIPIDEMIA: ICD-10-CM

## 2023-09-05 DIAGNOSIS — I65.23 BILATERAL CAROTID ARTERY STENOSIS: ICD-10-CM

## 2023-09-05 DIAGNOSIS — I25.10 CORONARY ARTERY DISEASE INVOLVING NATIVE CORONARY ARTERY OF NATIVE HEART, UNSPECIFIED WHETHER ANGINA PRESENT: Primary | ICD-10-CM

## 2023-09-05 DIAGNOSIS — Z86.79 STATUS POST AAA (ABDOMINAL AORTIC ANEURYSM) REPAIR: ICD-10-CM

## 2023-09-05 DIAGNOSIS — I10 ESSENTIAL HYPERTENSION, BENIGN: ICD-10-CM

## 2023-09-05 DIAGNOSIS — Z98.890 STATUS POST AAA (ABDOMINAL AORTIC ANEURYSM) REPAIR: ICD-10-CM

## 2023-09-05 PROCEDURE — 3077F SYST BP >= 140 MM HG: CPT | Performed by: INTERNAL MEDICINE

## 2023-09-05 PROCEDURE — 1123F ACP DISCUSS/DSCN MKR DOCD: CPT | Performed by: INTERNAL MEDICINE

## 2023-09-05 PROCEDURE — 99214 OFFICE O/P EST MOD 30 MIN: CPT | Performed by: INTERNAL MEDICINE

## 2023-09-05 PROCEDURE — 3079F DIAST BP 80-89 MM HG: CPT | Performed by: INTERNAL MEDICINE

## 2023-09-05 PROCEDURE — 1036F TOBACCO NON-USER: CPT | Performed by: INTERNAL MEDICINE

## 2023-09-05 PROCEDURE — G8417 CALC BMI ABV UP PARAM F/U: HCPCS | Performed by: INTERNAL MEDICINE

## 2023-09-05 PROCEDURE — G8427 DOCREV CUR MEDS BY ELIG CLIN: HCPCS | Performed by: INTERNAL MEDICINE

## 2023-09-05 RX ORDER — ISOSORBIDE MONONITRATE 60 MG/1
60 TABLET, EXTENDED RELEASE ORAL DAILY
Qty: 90 TABLET | Refills: 3 | Status: SHIPPED | OUTPATIENT
Start: 2023-09-05

## 2023-09-05 NOTE — PATIENT INSTRUCTIONS
Increase Imdur to 60 mg daily  Continue risk factor modifications. Call for any change in symptoms, call to report any changes in shortness of breath or development of chest pain with activity.     Follow up in 6 mos

## 2023-09-15 ENCOUNTER — TELEPHONE (OUTPATIENT)
Dept: FAMILY MEDICINE CLINIC | Age: 83
End: 2023-09-15

## 2023-09-15 DIAGNOSIS — E11.59 TYPE 2 DIABETES MELLITUS WITH VASCULAR DISEASE (HCC): ICD-10-CM

## 2023-09-29 ENCOUNTER — TELEPHONE (OUTPATIENT)
Dept: FAMILY MEDICINE CLINIC | Age: 83
End: 2023-09-29

## 2023-09-29 NOTE — TELEPHONE ENCOUNTER
Patient called scheduled flu shot for Delicia Roger wanted to know if he was due for any other immunizations.    Please call patient with udpar

## 2023-09-29 NOTE — TELEPHONE ENCOUNTER
----- Message from Bhupendrahaiwhitley CohenSyd sent at 9/28/2023  4:44 PM EDT -----  Subject: Referral Request    Reason for referral request? Patient is wanting to get a flu shot, please   call patient back to discuss. Provider patient wants to be referred to(if known):     Provider Phone Number(if known):     Additional Information for Provider?   ---------------------------------------------------------------------------  --------------  Leonid Gable Steve    [phone]; OK to leave message on voicemail  ---------------------------------------------------------------------------  --------------

## 2023-10-02 ENCOUNTER — NURSE ONLY (OUTPATIENT)
Dept: FAMILY MEDICINE CLINIC | Age: 83
End: 2023-10-02

## 2023-10-02 DIAGNOSIS — Z23 NEED FOR VACCINATION: Primary | ICD-10-CM

## 2023-11-15 RX ORDER — ATORVASTATIN CALCIUM 80 MG/1
TABLET, FILM COATED ORAL
Qty: 90 TABLET | Refills: 10 | Status: SHIPPED | OUTPATIENT
Start: 2023-11-15

## 2023-11-15 NOTE — TELEPHONE ENCOUNTER
Received refill request for atorvastatin from [address] [place] [address].     Last ov: 09/05/2023 LES    Last Refill: 10/24/2022 #90 w/ 3 refills    Next appointment: 03/11/2024 LES

## 2023-12-06 RX ORDER — TAMSULOSIN HYDROCHLORIDE 0.4 MG/1
CAPSULE ORAL
Qty: 60 CAPSULE | Refills: 3 | Status: SHIPPED | OUTPATIENT
Start: 2023-12-06

## 2023-12-06 NOTE — TELEPHONE ENCOUNTER
Medication:   Requested Prescriptions     Pending Prescriptions Disp Refills    tamsulosin (FLOMAX) 0.4 MG capsule [Pharmacy Med Name: TAMSULOSIN HYDROCHLORIDE 0.4 MG Capsule] 60 capsule 3     Sig: TAKE 1 CAPSULE EVERY DAY        Last Filled:    10/16/2023  Patient Phone Number: [phone] (home)     Last appt: 7/22/2022   Next appt: Visit date not found    Last OARRS:        No data to display

## 2024-01-10 DIAGNOSIS — E11.59 TYPE 2 DIABETES MELLITUS WITH VASCULAR DISEASE (HCC): ICD-10-CM

## 2024-01-10 RX ORDER — CALCIUM CITRATE/VITAMIN D3 200MG-6.25
TABLET ORAL
Qty: 100 STRIP | Refills: 3 | Status: ON HOLD | OUTPATIENT
Start: 2024-01-10

## 2024-01-10 NOTE — TELEPHONE ENCOUNTER
Medication:   Requested Prescriptions     Pending Prescriptions Disp Refills    blood glucose test strips (TRUE METRIX BLOOD GLUCOSE TEST) strip [Pharmacy Med Name: TRUE METRIX SELF MONITORING BLOOD GLUCOSE STRIPS   Strip] 100 strip 3     Sig: USE TO TEST BLOOD SUGAR DAILY AS NEEDED       Last Filled:  10/17/2022    Patient Phone Number: [phone] (home)     Last appt: 7/22/2022   Next appt: Visit date not found    Last Labs DM:   Lab Results   Component Value Date/Time    LABA1C 5.9 07/13/2022 07:15 AM

## 2024-01-13 ENCOUNTER — APPOINTMENT (OUTPATIENT)
Dept: CT IMAGING | Age: 84
DRG: 287 | End: 2024-01-13
Payer: MEDICARE

## 2024-01-13 ENCOUNTER — APPOINTMENT (OUTPATIENT)
Dept: GENERAL RADIOLOGY | Age: 84
DRG: 287 | End: 2024-01-13
Payer: MEDICARE

## 2024-01-13 ENCOUNTER — HOSPITAL ENCOUNTER (INPATIENT)
Age: 84
LOS: 3 days | Discharge: HOME | DRG: 287 | End: 2024-01-16
Attending: EMERGENCY MEDICINE | Admitting: INTERNAL MEDICINE
Payer: MEDICARE

## 2024-01-13 DIAGNOSIS — R07.9 ACUTE CHEST PAIN: Primary | ICD-10-CM

## 2024-01-13 DIAGNOSIS — R06.02 SHORTNESS OF BREATH: ICD-10-CM

## 2024-01-13 LAB
ALBUMIN SERPL-MCNC: 4.1 G/DL (ref 3.4–5)
ALBUMIN/GLOB SERPL: 1.3 {RATIO} (ref 1.1–2.2)
ALP SERPL-CCNC: 137 U/L (ref 40–129)
ALT SERPL-CCNC: 15 U/L (ref 10–40)
ANION GAP SERPL CALCULATED.3IONS-SCNC: 9 MMOL/L (ref 3–16)
AST SERPL-CCNC: 19 U/L (ref 15–37)
BACTERIA URNS QL MICRO: ABNORMAL /HPF
BASOPHILS # BLD: 0.1 K/UL (ref 0–0.2)
BASOPHILS NFR BLD: 1 %
BILIRUB SERPL-MCNC: 0.5 MG/DL (ref 0–1)
BILIRUB UR QL STRIP.AUTO: NEGATIVE
BUN SERPL-MCNC: 23 MG/DL (ref 7–20)
CALCIUM SERPL-MCNC: 9.7 MG/DL (ref 8.3–10.6)
CHLORIDE SERPL-SCNC: 104 MMOL/L (ref 99–110)
CLARITY UR: CLEAR
CO2 SERPL-SCNC: 25 MMOL/L (ref 21–32)
COLOR UR: ABNORMAL
CREAT SERPL-MCNC: 1.2 MG/DL (ref 0.8–1.3)
DEPRECATED RDW RBC AUTO: 14.2 % (ref 12.4–15.4)
EOSINOPHIL # BLD: 0.2 K/UL (ref 0–0.6)
EOSINOPHIL NFR BLD: 3.7 %
EPI CELLS #/AREA URNS AUTO: 3 /HPF (ref 0–5)
GFR SERPLBLD CREATININE-BSD FMLA CKD-EPI: 59 ML/MIN/{1.73_M2}
GLUCOSE BLD-MCNC: 163 MG/DL (ref 70–99)
GLUCOSE SERPL-MCNC: 148 MG/DL (ref 70–99)
GLUCOSE UR STRIP.AUTO-MCNC: NEGATIVE MG/DL
GRANULAR CASTS: PRESENT
HCT VFR BLD AUTO: 41.3 % (ref 40.5–52.5)
HGB BLD-MCNC: 14.2 G/DL (ref 13.5–17.5)
HGB UR QL STRIP.AUTO: NEGATIVE
HYALINE CASTS: PRESENT
KETONES UR STRIP.AUTO-MCNC: ABNORMAL MG/DL
LEUKOCYTE ESTERASE UR QL STRIP.AUTO: ABNORMAL
LYMPHOCYTES # BLD: 1.5 K/UL (ref 1–5.1)
LYMPHOCYTES NFR BLD: 26 %
MCH RBC QN AUTO: 32.5 PG (ref 26–34)
MCHC RBC AUTO-ENTMCNC: 34.5 G/DL (ref 31–36)
MCV RBC AUTO: 94.3 FL (ref 80–100)
MONOCYTES # BLD: 0.4 K/UL (ref 0–1.3)
MONOCYTES NFR BLD: 6.9 %
NEUTROPHILS # BLD: 3.6 K/UL (ref 1.7–7.7)
NEUTROPHILS NFR BLD: 62.4 %
NITRITE UR QL STRIP.AUTO: NEGATIVE
NT-PROBNP SERPL-MCNC: 580 PG/ML (ref 0–449)
PERFORMED ON: ABNORMAL
PH UR STRIP.AUTO: 5 [PH] (ref 5–8)
PLATELET # BLD AUTO: 136 K/UL (ref 135–450)
PMV BLD AUTO: 9.6 FL (ref 5–10.5)
POTASSIUM SERPL-SCNC: 4.8 MMOL/L (ref 3.5–5.1)
PROT SERPL-MCNC: 7.2 G/DL (ref 6.4–8.2)
PROT UR STRIP.AUTO-MCNC: 30 MG/DL
RBC # BLD AUTO: 4.38 M/UL (ref 4.2–5.9)
RBC CLUMPS #/AREA URNS AUTO: 1 /HPF (ref 0–4)
SODIUM SERPL-SCNC: 138 MMOL/L (ref 136–145)
SP GR UR STRIP.AUTO: 1.02 (ref 1–1.03)
TROPONIN, HIGH SENSITIVITY: 17 NG/L (ref 0–22)
TROPONIN, HIGH SENSITIVITY: 17 NG/L (ref 0–22)
TROPONIN, HIGH SENSITIVITY: 20 NG/L (ref 0–22)
TROPONIN, HIGH SENSITIVITY: 23 NG/L (ref 0–22)
UA COMPLETE W REFLEX CULTURE PNL UR: ABNORMAL
UA DIPSTICK W REFLEX MICRO PNL UR: YES
URN SPEC COLLECT METH UR: ABNORMAL
UROBILINOGEN UR STRIP-ACNC: 1 E.U./DL
WBC # BLD AUTO: 5.7 K/UL (ref 4–11)
WBC #/AREA URNS AUTO: 2 /HPF (ref 0–5)

## 2024-01-13 PROCEDURE — 70450 CT HEAD/BRAIN W/O DYE: CPT

## 2024-01-13 PROCEDURE — 6360000004 HC RX CONTRAST MEDICATION: Performed by: EMERGENCY MEDICINE

## 2024-01-13 PROCEDURE — 81001 URINALYSIS AUTO W/SCOPE: CPT

## 2024-01-13 PROCEDURE — 36415 COLL VENOUS BLD VENIPUNCTURE: CPT

## 2024-01-13 PROCEDURE — 83880 ASSAY OF NATRIURETIC PEPTIDE: CPT

## 2024-01-13 PROCEDURE — 1200000000 HC SEMI PRIVATE

## 2024-01-13 PROCEDURE — 99285 EMERGENCY DEPT VISIT HI MDM: CPT

## 2024-01-13 PROCEDURE — 71046 X-RAY EXAM CHEST 2 VIEWS: CPT

## 2024-01-13 PROCEDURE — 6370000000 HC RX 637 (ALT 250 FOR IP): Performed by: EMERGENCY MEDICINE

## 2024-01-13 PROCEDURE — 85025 COMPLETE CBC W/AUTO DIFF WBC: CPT

## 2024-01-13 PROCEDURE — 2580000003 HC RX 258: Performed by: INTERNAL MEDICINE

## 2024-01-13 PROCEDURE — 93005 ELECTROCARDIOGRAM TRACING: CPT | Performed by: EMERGENCY MEDICINE

## 2024-01-13 PROCEDURE — 84484 ASSAY OF TROPONIN QUANT: CPT

## 2024-01-13 PROCEDURE — 71260 CT THORAX DX C+: CPT

## 2024-01-13 PROCEDURE — 6370000000 HC RX 637 (ALT 250 FOR IP): Performed by: INTERNAL MEDICINE

## 2024-01-13 PROCEDURE — 80053 COMPREHEN METABOLIC PANEL: CPT

## 2024-01-13 RX ORDER — POLYETHYLENE GLYCOL 3350 17 G/17G
17 POWDER, FOR SOLUTION ORAL DAILY PRN
Status: DISCONTINUED | OUTPATIENT
Start: 2024-01-13 | End: 2024-01-16 | Stop reason: HOSPADM

## 2024-01-13 RX ORDER — ASPIRIN 81 MG/1
81 TABLET, CHEWABLE ORAL DAILY
Status: DISCONTINUED | OUTPATIENT
Start: 2024-01-14 | End: 2024-01-16 | Stop reason: HOSPADM

## 2024-01-13 RX ORDER — MAGNESIUM SULFATE IN WATER 40 MG/ML
2000 INJECTION, SOLUTION INTRAVENOUS PRN
Status: DISCONTINUED | OUTPATIENT
Start: 2024-01-13 | End: 2024-01-16 | Stop reason: HOSPADM

## 2024-01-13 RX ORDER — SODIUM CHLORIDE 0.9 % (FLUSH) 0.9 %
5-40 SYRINGE (ML) INJECTION PRN
Status: DISCONTINUED | OUTPATIENT
Start: 2024-01-13 | End: 2024-01-16 | Stop reason: HOSPADM

## 2024-01-13 RX ORDER — POTASSIUM CHLORIDE 7.45 MG/ML
10 INJECTION INTRAVENOUS PRN
Status: DISCONTINUED | OUTPATIENT
Start: 2024-01-13 | End: 2024-01-16 | Stop reason: HOSPADM

## 2024-01-13 RX ORDER — ISOSORBIDE MONONITRATE 60 MG/1
60 TABLET, EXTENDED RELEASE ORAL DAILY
Status: DISCONTINUED | OUTPATIENT
Start: 2024-01-14 | End: 2024-01-16 | Stop reason: HOSPADM

## 2024-01-13 RX ORDER — ACETAMINOPHEN 650 MG/1
650 SUPPOSITORY RECTAL EVERY 6 HOURS PRN
Status: DISCONTINUED | OUTPATIENT
Start: 2024-01-13 | End: 2024-01-16 | Stop reason: HOSPADM

## 2024-01-13 RX ORDER — SODIUM CHLORIDE 0.9 % (FLUSH) 0.9 %
5-40 SYRINGE (ML) INJECTION EVERY 12 HOURS SCHEDULED
Status: DISCONTINUED | OUTPATIENT
Start: 2024-01-13 | End: 2024-01-16 | Stop reason: HOSPADM

## 2024-01-13 RX ORDER — ASPIRIN 81 MG/1
324 TABLET, CHEWABLE ORAL ONCE
Status: COMPLETED | OUTPATIENT
Start: 2024-01-13 | End: 2024-01-13

## 2024-01-13 RX ORDER — LISINOPRIL 20 MG/1
20 TABLET ORAL 2 TIMES DAILY
Status: DISCONTINUED | OUTPATIENT
Start: 2024-01-13 | End: 2024-01-16 | Stop reason: HOSPADM

## 2024-01-13 RX ORDER — SODIUM CHLORIDE 9 MG/ML
INJECTION, SOLUTION INTRAVENOUS PRN
Status: DISCONTINUED | OUTPATIENT
Start: 2024-01-13 | End: 2024-01-16 | Stop reason: HOSPADM

## 2024-01-13 RX ORDER — DIPHENHYDRAMINE HCL 25 MG
50 TABLET ORAL ONCE
Status: COMPLETED | OUTPATIENT
Start: 2024-01-13 | End: 2024-01-13

## 2024-01-13 RX ORDER — DEXTROSE MONOHYDRATE 100 MG/ML
INJECTION, SOLUTION INTRAVENOUS CONTINUOUS PRN
Status: DISCONTINUED | OUTPATIENT
Start: 2024-01-13 | End: 2024-01-16 | Stop reason: HOSPADM

## 2024-01-13 RX ORDER — ACETAMINOPHEN 325 MG/1
650 TABLET ORAL EVERY 6 HOURS PRN
Status: DISCONTINUED | OUTPATIENT
Start: 2024-01-13 | End: 2024-01-15

## 2024-01-13 RX ORDER — ONDANSETRON 4 MG/1
4 TABLET, ORALLY DISINTEGRATING ORAL EVERY 8 HOURS PRN
Status: DISCONTINUED | OUTPATIENT
Start: 2024-01-13 | End: 2024-01-16 | Stop reason: HOSPADM

## 2024-01-13 RX ORDER — ATORVASTATIN CALCIUM 80 MG/1
80 TABLET, FILM COATED ORAL NIGHTLY
Status: DISCONTINUED | OUTPATIENT
Start: 2024-01-13 | End: 2024-01-16 | Stop reason: HOSPADM

## 2024-01-13 RX ORDER — IBUPROFEN 400 MG/1
400 TABLET ORAL 3 TIMES DAILY
Status: DISCONTINUED | OUTPATIENT
Start: 2024-01-13 | End: 2024-01-16 | Stop reason: HOSPADM

## 2024-01-13 RX ORDER — TAMSULOSIN HYDROCHLORIDE 0.4 MG/1
0.4 CAPSULE ORAL DAILY
Status: DISCONTINUED | OUTPATIENT
Start: 2024-01-14 | End: 2024-01-16 | Stop reason: HOSPADM

## 2024-01-13 RX ORDER — GLUCAGON 1 MG/ML
1 KIT INJECTION PRN
Status: DISCONTINUED | OUTPATIENT
Start: 2024-01-13 | End: 2024-01-16 | Stop reason: HOSPADM

## 2024-01-13 RX ORDER — INSULIN LISPRO 100 [IU]/ML
0-4 INJECTION, SOLUTION INTRAVENOUS; SUBCUTANEOUS NIGHTLY
Status: DISCONTINUED | OUTPATIENT
Start: 2024-01-13 | End: 2024-01-16 | Stop reason: HOSPADM

## 2024-01-13 RX ORDER — ENOXAPARIN SODIUM 100 MG/ML
30 INJECTION SUBCUTANEOUS 2 TIMES DAILY
Status: DISCONTINUED | OUTPATIENT
Start: 2024-01-14 | End: 2024-01-16 | Stop reason: HOSPADM

## 2024-01-13 RX ORDER — INSULIN LISPRO 100 [IU]/ML
0-8 INJECTION, SOLUTION INTRAVENOUS; SUBCUTANEOUS
Status: DISCONTINUED | OUTPATIENT
Start: 2024-01-14 | End: 2024-01-16 | Stop reason: HOSPADM

## 2024-01-13 RX ORDER — POTASSIUM CHLORIDE 20 MEQ/1
40 TABLET, EXTENDED RELEASE ORAL PRN
Status: DISCONTINUED | OUTPATIENT
Start: 2024-01-13 | End: 2024-01-16 | Stop reason: HOSPADM

## 2024-01-13 RX ORDER — ONDANSETRON 2 MG/ML
4 INJECTION INTRAMUSCULAR; INTRAVENOUS EVERY 6 HOURS PRN
Status: DISCONTINUED | OUTPATIENT
Start: 2024-01-13 | End: 2024-01-16 | Stop reason: HOSPADM

## 2024-01-13 RX ORDER — CLOPIDOGREL BISULFATE 75 MG/1
75 TABLET ORAL DAILY
Status: DISCONTINUED | OUTPATIENT
Start: 2024-01-14 | End: 2024-01-16 | Stop reason: HOSPADM

## 2024-01-13 RX ADMIN — ASPIRIN 81 MG 324 MG: 81 TABLET ORAL at 16:06

## 2024-01-13 RX ADMIN — METOPROLOL TARTRATE 25 MG: 25 TABLET, FILM COATED ORAL at 22:08

## 2024-01-13 RX ADMIN — LISINOPRIL 20 MG: 20 TABLET ORAL at 22:08

## 2024-01-13 RX ADMIN — DIPHENHYDRAMINE HYDROCHLORIDE 50 MG: 25 TABLET ORAL at 18:10

## 2024-01-13 RX ADMIN — Medication 10 ML: at 22:08

## 2024-01-13 RX ADMIN — IBUPROFEN 400 MG: 400 TABLET, FILM COATED ORAL at 22:07

## 2024-01-13 RX ADMIN — ATORVASTATIN CALCIUM 80 MG: 80 TABLET, FILM COATED ORAL at 22:08

## 2024-01-13 RX ADMIN — PREDNISONE 50 MG: 20 TABLET ORAL at 18:10

## 2024-01-13 RX ADMIN — IOPAMIDOL 75 ML: 755 INJECTION, SOLUTION INTRAVENOUS at 18:23

## 2024-01-13 ASSESSMENT — HEART SCORE: ECG: 1

## 2024-01-13 ASSESSMENT — PAIN SCALES - GENERAL
PAINLEVEL_OUTOF10: 0
PAINLEVEL_OUTOF10: 0

## 2024-01-13 ASSESSMENT — LIFESTYLE VARIABLES
HOW OFTEN DO YOU HAVE A DRINK CONTAINING ALCOHOL: NEVER
HOW MANY STANDARD DRINKS CONTAINING ALCOHOL DO YOU HAVE ON A TYPICAL DAY: PATIENT DOES NOT DRINK

## 2024-01-13 NOTE — ED PROVIDER NOTES
EMERGENCY MEDICINE PROVIDER NOTE    Patient Identification  Pt Name: Mitchel Mckinney  MRN: [id number]  Birthdate 1940  Date of evaluation: 1/13/2024  Provider: Aly Templeton MD  PCP: Joe Gerber MD    Chief Complaint  Shortness of Breath (Patient states he was at the grocery store. Patient started having SOB, neck pain, dizziness, and confusion. Patient has chest tightness)      HPI  (History provided by patient)  This is a 83 y.o. male who was brought in by self after having ***.     I have reviewed the following nursing documentation:  Allergies: Latex and Iodine    Past medical history:   Past Medical History:   Diagnosis Date    Acquired spondylolisthesis 1/29/2014    Arthritis     diffuse    CAD (coronary artery disease)     S/P PCI and CABG    Carotid disease, bilateral (HCC) 2/3/2014    Cholelithiasis 11/10/2014    Chronic systolic heart failure (HCC)     Diabetes mellitus type II     Essential hypertension     Hyperlipidemia     Obesity (BMI 30.0-34.9) 2/27/2015    Prolonged emergence from general anesthesia     slow to wake up    Spinal stenosis of lumbar region 1/29/2014    Status post AAA (abdominal aortic aneurysm) repair 11/10/2014     Past surgical history:   Past Surgical History:   Procedure Laterality Date    ABDOMINAL AORTIC ANEURYSM REPAIR, ENDOVASCULAR  2009 Zavipint; stent    CAROTID ENDARTERECTOMY Left 2006 Zaatiyayat    CARPAL TUNNEL RELEASE Right 11/20/2020    CARPAL TUNNEL RELEASE Left 12/18/2020    CORONARY ANGIOPLASTY WITH STENT PLACEMENT  2009    CORONARY ARTERY BYPASS GRAFT  2002    [facility], x4    CYST REMOVAL Right 1997    wrist    ELBOW SURGERY Left 1984    laceration to tendons    HIP ARTHROPLASTY Left 1999    [facility]    INGUINAL HERNIA REPAIR Left 1985    OTHER SURGICAL HISTORY  03/06/2018    excision of sebacious cyst on back    RETINAL DETACHMENT SURGERY Left 2004    [facility]    REVISION TOTAL HIP ARTHROPLASTY Left 9/23/2019    LEFT TOTAL HIP ARTHROPLASTY REVISION POSTERIOR

## 2024-01-14 LAB
ALBUMIN SERPL-MCNC: 3.8 G/DL (ref 3.4–5)
ALBUMIN/GLOB SERPL: 1.5 {RATIO} (ref 1.1–2.2)
ALP SERPL-CCNC: 124 U/L (ref 40–129)
ALT SERPL-CCNC: 12 U/L (ref 10–40)
ANION GAP SERPL CALCULATED.3IONS-SCNC: 10 MMOL/L (ref 3–16)
AST SERPL-CCNC: 14 U/L (ref 15–37)
BILIRUB SERPL-MCNC: 0.6 MG/DL (ref 0–1)
BUN SERPL-MCNC: 25 MG/DL (ref 7–20)
CALCIUM SERPL-MCNC: 9 MG/DL (ref 8.3–10.6)
CHLORIDE SERPL-SCNC: 110 MMOL/L (ref 99–110)
CHOLEST SERPL-MCNC: 131 MG/DL (ref 0–199)
CO2 SERPL-SCNC: 22 MMOL/L (ref 21–32)
CREAT SERPL-MCNC: 1.1 MG/DL (ref 0.8–1.3)
DEPRECATED RDW RBC AUTO: 14.1 % (ref 12.4–15.4)
GFR SERPLBLD CREATININE-BSD FMLA CKD-EPI: >60 ML/MIN/{1.73_M2}
GLUCOSE BLD-MCNC: 130 MG/DL (ref 70–99)
GLUCOSE BLD-MCNC: 169 MG/DL (ref 70–99)
GLUCOSE BLD-MCNC: 197 MG/DL (ref 70–99)
GLUCOSE BLD-MCNC: 239 MG/DL (ref 70–99)
GLUCOSE SERPL-MCNC: 204 MG/DL (ref 70–99)
HCT VFR BLD AUTO: 41.3 % (ref 40.5–52.5)
HDLC SERPL-MCNC: 35 MG/DL (ref 40–60)
HGB BLD-MCNC: 13.8 G/DL (ref 13.5–17.5)
LDLC SERPL CALC-MCNC: 81 MG/DL
MCH RBC QN AUTO: 31.6 PG (ref 26–34)
MCHC RBC AUTO-ENTMCNC: 33.4 G/DL (ref 31–36)
MCV RBC AUTO: 94.5 FL (ref 80–100)
PERFORMED ON: ABNORMAL
PLATELET # BLD AUTO: 137 K/UL (ref 135–450)
PMV BLD AUTO: 10.1 FL (ref 5–10.5)
POTASSIUM SERPL-SCNC: 4.5 MMOL/L (ref 3.5–5.1)
PROT SERPL-MCNC: 6.4 G/DL (ref 6.4–8.2)
RBC # BLD AUTO: 4.37 M/UL (ref 4.2–5.9)
SODIUM SERPL-SCNC: 142 MMOL/L (ref 136–145)
TRIGL SERPL-MCNC: 74 MG/DL (ref 0–150)
VLDLC SERPL CALC-MCNC: 15 MG/DL
WBC # BLD AUTO: 6 K/UL (ref 4–11)

## 2024-01-14 PROCEDURE — 85027 COMPLETE CBC AUTOMATED: CPT

## 2024-01-14 PROCEDURE — 6360000002 HC RX W HCPCS: Performed by: INTERNAL MEDICINE

## 2024-01-14 PROCEDURE — 2060000000 HC ICU INTERMEDIATE R&B

## 2024-01-14 PROCEDURE — 80053 COMPREHEN METABOLIC PANEL: CPT

## 2024-01-14 PROCEDURE — 2580000003 HC RX 258: Performed by: INTERNAL MEDICINE

## 2024-01-14 PROCEDURE — 1200000000 HC SEMI PRIVATE

## 2024-01-14 PROCEDURE — 6370000000 HC RX 637 (ALT 250 FOR IP): Performed by: STUDENT IN AN ORGANIZED HEALTH CARE EDUCATION/TRAINING PROGRAM

## 2024-01-14 PROCEDURE — 6370000000 HC RX 637 (ALT 250 FOR IP): Performed by: INTERNAL MEDICINE

## 2024-01-14 PROCEDURE — 36415 COLL VENOUS BLD VENIPUNCTURE: CPT

## 2024-01-14 PROCEDURE — 80061 LIPID PANEL: CPT

## 2024-01-14 RX ORDER — CARVEDILOL 6.25 MG/1
6.25 TABLET ORAL 2 TIMES DAILY WITH MEALS
Status: DISCONTINUED | OUTPATIENT
Start: 2024-01-14 | End: 2024-01-16 | Stop reason: HOSPADM

## 2024-01-14 RX ADMIN — TAMSULOSIN HYDROCHLORIDE 0.4 MG: 0.4 CAPSULE ORAL at 08:07

## 2024-01-14 RX ADMIN — Medication 10 ML: at 08:07

## 2024-01-14 RX ADMIN — CARVEDILOL 6.25 MG: 6.25 TABLET, FILM COATED ORAL at 16:10

## 2024-01-14 RX ADMIN — IBUPROFEN 400 MG: 400 TABLET, FILM COATED ORAL at 08:07

## 2024-01-14 RX ADMIN — Medication 10 ML: at 20:14

## 2024-01-14 RX ADMIN — ISOSORBIDE MONONITRATE 60 MG: 60 TABLET, EXTENDED RELEASE ORAL at 08:07

## 2024-01-14 RX ADMIN — LISINOPRIL 20 MG: 20 TABLET ORAL at 20:14

## 2024-01-14 RX ADMIN — ATORVASTATIN CALCIUM 80 MG: 80 TABLET, FILM COATED ORAL at 20:14

## 2024-01-14 RX ADMIN — ENOXAPARIN SODIUM 30 MG: 100 INJECTION SUBCUTANEOUS at 08:07

## 2024-01-14 RX ADMIN — LISINOPRIL 20 MG: 20 TABLET ORAL at 08:07

## 2024-01-14 RX ADMIN — CLOPIDOGREL 75 MG: 75 TABLET, FILM COATED ORAL at 08:07

## 2024-01-14 RX ADMIN — METOPROLOL TARTRATE 25 MG: 25 TABLET, FILM COATED ORAL at 08:07

## 2024-01-14 RX ADMIN — ASPIRIN 81 MG 81 MG: 81 TABLET ORAL at 08:07

## 2024-01-14 RX ADMIN — IBUPROFEN 400 MG: 400 TABLET, FILM COATED ORAL at 20:14

## 2024-01-14 RX ADMIN — ENOXAPARIN SODIUM 30 MG: 100 INJECTION SUBCUTANEOUS at 20:15

## 2024-01-14 ASSESSMENT — PAIN SCALES - GENERAL
PAINLEVEL_OUTOF10: 0
PAINLEVEL_OUTOF10: 0

## 2024-01-14 NOTE — CONSULTS
normal judgement and insight        Assessment/Plan  Chest pain consistent with unstable angina. Mild trop elevation on admit. Chronic RBBB. SP PCI 7/22 with similar symptoms now as then. SP CABG with LIMA-LAD, diseased SVG-PDA with PCI of RCA and occluded SVG -OM.   I do not think a stress test will be diagnostic. Renal function is normal.   Sp CEA - no localized neuro deficit       Plan: [facility] in the am.               Thank you for allowing to me to participate in the care of Mitchel Mckinney.

## 2024-01-14 NOTE — H&P
83 y.o. male with a PMH of coronary disease status post CABG, status post carotid endarterectomy, chronic systolic heart failure EF of 43% on last echo, hypertension hyperlipidemia, type 2 diabetes who presents to the ED with concerns of chest tightness.       # Chest pain  Patient complains of chest pain.  Patient has an extensive cardiac history  Troponins 23/20.  proBNP 580.  EKG showed no acute changes  Plan-cardiology consult and follow-up    # History of CAD status post CABG  Resume home medications    # History of carotid endarterectomy  Complains of mild confusion  CT head showed no acute intracranial abnormality  Check CTA of the neck    #Hypertension/Hyperlipidemia  On medications.  Resume  Monitor hemodynamics    #DM2  On oral hypoglycemics -metformin  Monitor blood sugar before every meal nightly  Blood sugar goals 120-140    DVT prophylaxis: Lovenox    Diet: CARDIAC  Code StatusFULL    Consults:  IP CONSULT TO CARDIOLOGY    Disposition: Admit to Inpatient   ELOS: Less than two midnights    Please note that portions of this note were completed with a voice recognition program.  Efforts were made to edit the dictations but occasionally words are mis-transcribed.)     Riley Avalos MD

## 2024-01-14 NOTE — PLAN OF CARE
Problem: Discharge Planning  Goal: Discharge to home or other facility with appropriate resources  Outcome: Progressing     Problem: Safety - Adult  Goal: Free from fall injury  1/14/2024 0917 by Philip Cobb RN  Outcome: Progressing     Problem: ABCDS Injury Assessment  Goal: Absence of physical injury  Outcome: Progressing     Problem: Pain  Goal: Verbalizes/displays adequate comfort level or baseline comfort level  1/14/2024 0917 by Philip Cobb RN  Outcome: Progressing

## 2024-01-15 LAB
ANION GAP SERPL CALCULATED.3IONS-SCNC: 9 MMOL/L (ref 3–16)
BASOPHILS # BLD: 0.1 K/UL (ref 0–0.2)
BASOPHILS NFR BLD: 0.9 %
BUN SERPL-MCNC: 26 MG/DL (ref 7–20)
CALCIUM SERPL-MCNC: 9 MG/DL (ref 8.3–10.6)
CHLORIDE SERPL-SCNC: 108 MMOL/L (ref 99–110)
CO2 SERPL-SCNC: 24 MMOL/L (ref 21–32)
CREAT SERPL-MCNC: 1 MG/DL (ref 0.8–1.3)
DEPRECATED RDW RBC AUTO: 14.1 % (ref 12.4–15.4)
EKG ATRIAL RATE: 67 BPM
EKG DIAGNOSIS: NORMAL
EKG P AXIS: 46 DEGREES
EKG P-R INTERVAL: 188 MS
EKG Q-T INTERVAL: 442 MS
EKG QRS DURATION: 138 MS
EKG QTC CALCULATION (BAZETT): 467 MS
EKG R AXIS: -45 DEGREES
EKG T AXIS: 9 DEGREES
EKG VENTRICULAR RATE: 67 BPM
EOSINOPHIL # BLD: 0.2 K/UL (ref 0–0.6)
EOSINOPHIL NFR BLD: 3.4 %
GFR SERPLBLD CREATININE-BSD FMLA CKD-EPI: >60 ML/MIN/{1.73_M2}
GLUCOSE BLD-MCNC: 127 MG/DL (ref 70–99)
GLUCOSE BLD-MCNC: 135 MG/DL (ref 70–99)
GLUCOSE BLD-MCNC: 146 MG/DL (ref 70–99)
GLUCOSE BLD-MCNC: 265 MG/DL (ref 70–99)
GLUCOSE SERPL-MCNC: 125 MG/DL (ref 70–99)
HCT VFR BLD AUTO: 42.7 % (ref 40.5–52.5)
HGB BLD-MCNC: 14.2 G/DL (ref 13.5–17.5)
LEFT VENTRICULAR EJECTION FRACTION MODE: NORMAL
LV EF: 60 %
LYMPHOCYTES # BLD: 1.7 K/UL (ref 1–5.1)
LYMPHOCYTES NFR BLD: 31.4 %
MCH RBC QN AUTO: 31.5 PG (ref 26–34)
MCHC RBC AUTO-ENTMCNC: 33.2 G/DL (ref 31–36)
MCV RBC AUTO: 94.8 FL (ref 80–100)
MONOCYTES # BLD: 0.4 K/UL (ref 0–1.3)
MONOCYTES NFR BLD: 7 %
NEUTROPHILS # BLD: 3.1 K/UL (ref 1.7–7.7)
NEUTROPHILS NFR BLD: 57.3 %
PERFORMED ON: ABNORMAL
PLATELET # BLD AUTO: 130 K/UL (ref 135–450)
PMV BLD AUTO: 10.1 FL (ref 5–10.5)
POTASSIUM SERPL-SCNC: 3.9 MMOL/L (ref 3.5–5.1)
RBC # BLD AUTO: 4.5 M/UL (ref 4.2–5.9)
SODIUM SERPL-SCNC: 141 MMOL/L (ref 136–145)
WBC # BLD AUTO: 5.4 K/UL (ref 4–11)

## 2024-01-15 PROCEDURE — 93459 L HRT ART/GRFT ANGIO: CPT | Performed by: INTERNAL MEDICINE

## 2024-01-15 PROCEDURE — 80048 BASIC METABOLIC PNL TOTAL CA: CPT

## 2024-01-15 PROCEDURE — C1894 INTRO/SHEATH, NON-LASER: HCPCS

## 2024-01-15 PROCEDURE — 93459 L HRT ART/GRFT ANGIO: CPT

## 2024-01-15 PROCEDURE — 6370000000 HC RX 637 (ALT 250 FOR IP): Performed by: INTERNAL MEDICINE

## 2024-01-15 PROCEDURE — B2111ZZ FLUOROSCOPY OF MULTIPLE CORONARY ARTERIES USING LOW OSMOLAR CONTRAST: ICD-10-PCS | Performed by: INTERNAL MEDICINE

## 2024-01-15 PROCEDURE — 6370000000 HC RX 637 (ALT 250 FOR IP): Performed by: STUDENT IN AN ORGANIZED HEALTH CARE EDUCATION/TRAINING PROGRAM

## 2024-01-15 PROCEDURE — 99153 MOD SED SAME PHYS/QHP EA: CPT

## 2024-01-15 PROCEDURE — 2060000000 HC ICU INTERMEDIATE R&B

## 2024-01-15 PROCEDURE — 2709999900 HC NON-CHARGEABLE SUPPLY

## 2024-01-15 PROCEDURE — 6370000000 HC RX 637 (ALT 250 FOR IP): Performed by: NURSE PRACTITIONER

## 2024-01-15 PROCEDURE — 2580000003 HC RX 258: Performed by: INTERNAL MEDICINE

## 2024-01-15 PROCEDURE — 99152 MOD SED SAME PHYS/QHP 5/>YRS: CPT

## 2024-01-15 PROCEDURE — 85025 COMPLETE CBC W/AUTO DIFF WBC: CPT

## 2024-01-15 PROCEDURE — B2151ZZ FLUOROSCOPY OF LEFT HEART USING LOW OSMOLAR CONTRAST: ICD-10-PCS | Performed by: INTERNAL MEDICINE

## 2024-01-15 PROCEDURE — 93010 ELECTROCARDIOGRAM REPORT: CPT | Performed by: INTERNAL MEDICINE

## 2024-01-15 PROCEDURE — 2500000003 HC RX 250 WO HCPCS

## 2024-01-15 PROCEDURE — 6360000004 HC RX CONTRAST MEDICATION: Performed by: INTERNAL MEDICINE

## 2024-01-15 PROCEDURE — C1769 GUIDE WIRE: HCPCS

## 2024-01-15 PROCEDURE — 6360000002 HC RX W HCPCS: Performed by: INTERNAL MEDICINE

## 2024-01-15 PROCEDURE — 2580000003 HC RX 258

## 2024-01-15 PROCEDURE — 36415 COLL VENOUS BLD VENIPUNCTURE: CPT

## 2024-01-15 PROCEDURE — 6360000002 HC RX W HCPCS

## 2024-01-15 PROCEDURE — 4A023N7 MEASUREMENT OF CARDIAC SAMPLING AND PRESSURE, LEFT HEART, PERCUTANEOUS APPROACH: ICD-10-PCS | Performed by: INTERNAL MEDICINE

## 2024-01-15 RX ORDER — SODIUM CHLORIDE 9 MG/ML
INJECTION, SOLUTION INTRAVENOUS PRN
Status: DISCONTINUED | OUTPATIENT
Start: 2024-01-15 | End: 2024-01-16 | Stop reason: HOSPADM

## 2024-01-15 RX ORDER — ONDANSETRON 2 MG/ML
4 INJECTION INTRAMUSCULAR; INTRAVENOUS EVERY 6 HOURS PRN
OUTPATIENT
Start: 2024-01-15

## 2024-01-15 RX ORDER — SODIUM CHLORIDE 0.9 % (FLUSH) 0.9 %
5-40 SYRINGE (ML) INJECTION EVERY 12 HOURS SCHEDULED
Status: DISCONTINUED | OUTPATIENT
Start: 2024-01-15 | End: 2024-01-16 | Stop reason: HOSPADM

## 2024-01-15 RX ORDER — ACETAMINOPHEN 325 MG/1
650 TABLET ORAL EVERY 4 HOURS PRN
Status: DISCONTINUED | OUTPATIENT
Start: 2024-01-15 | End: 2024-01-16 | Stop reason: HOSPADM

## 2024-01-15 RX ORDER — SODIUM CHLORIDE 0.9 % (FLUSH) 0.9 %
5-40 SYRINGE (ML) INJECTION PRN
Status: DISCONTINUED | OUTPATIENT
Start: 2024-01-15 | End: 2024-01-16 | Stop reason: HOSPADM

## 2024-01-15 RX ORDER — LANOLIN ALCOHOL/MO/W.PET/CERES
3 CREAM (GRAM) TOPICAL NIGHTLY PRN
Status: DISCONTINUED | OUTPATIENT
Start: 2024-01-15 | End: 2024-01-16 | Stop reason: HOSPADM

## 2024-01-15 RX ADMIN — IBUPROFEN 400 MG: 400 TABLET, FILM COATED ORAL at 20:41

## 2024-01-15 RX ADMIN — LISINOPRIL 20 MG: 20 TABLET ORAL at 08:44

## 2024-01-15 RX ADMIN — TAMSULOSIN HYDROCHLORIDE 0.4 MG: 0.4 CAPSULE ORAL at 08:44

## 2024-01-15 RX ADMIN — ENOXAPARIN SODIUM 30 MG: 100 INJECTION SUBCUTANEOUS at 20:42

## 2024-01-15 RX ADMIN — CARVEDILOL 6.25 MG: 6.25 TABLET, FILM COATED ORAL at 18:15

## 2024-01-15 RX ADMIN — Medication 10 ML: at 20:42

## 2024-01-15 RX ADMIN — ATORVASTATIN CALCIUM 80 MG: 80 TABLET, FILM COATED ORAL at 20:42

## 2024-01-15 RX ADMIN — ASPIRIN 81 MG 81 MG: 81 TABLET ORAL at 08:42

## 2024-01-15 RX ADMIN — ISOSORBIDE MONONITRATE 60 MG: 60 TABLET, EXTENDED RELEASE ORAL at 08:44

## 2024-01-15 RX ADMIN — CARVEDILOL 6.25 MG: 6.25 TABLET, FILM COATED ORAL at 08:42

## 2024-01-15 RX ADMIN — MELATONIN TAB 3 MG 3 MG: 3 TAB at 23:55

## 2024-01-15 RX ADMIN — Medication 10 ML: at 08:44

## 2024-01-15 RX ADMIN — CLOPIDOGREL 75 MG: 75 TABLET, FILM COATED ORAL at 08:43

## 2024-01-15 RX ADMIN — IOPAMIDOL 149 ML: 755 INJECTION, SOLUTION INTRAVENOUS at 15:03

## 2024-01-15 RX ADMIN — LISINOPRIL 20 MG: 20 TABLET ORAL at 20:42

## 2024-01-15 NOTE — PLAN OF CARE
Problem: ABCDS Injury Assessment  Goal: Absence of physical injury  1/14/2024 2238 by Maggy Hughes, RN  Outcome: Progressing

## 2024-01-15 NOTE — PLAN OF CARE
Problem: Discharge Planning  Goal: Discharge to home or other facility with appropriate resources  Outcome: Progressing     Problem: Safety - Adult  Goal: Free from fall injury  Outcome: Progressing     Problem: ABCDS Injury Assessment  Goal: Absence of physical injury  1/15/2024 0754 by Lalitha Salas, RN  Outcome: Progressing  1/14/2024 2238 by Maggy Hughes, RN  Outcome: Progressing     Problem: Pain  Goal: Verbalizes/displays adequate comfort level or baseline comfort level  Outcome: Progressing

## 2024-01-15 NOTE — PROCEDURES
[facility]   Procedure Note    CLINICAL HISTORY AND INDICATIONS:       Mitchel Mckinney is a 83 y.o. male with a history of coronary artery disease.  [id number]}.        INFORMED CONSENT:      Informed consent was obtained from the patient and the family members.  I explained to them risks and benefits of the procedure.  I explained to them we will do this from either the common femoral artery access or radial access. I explained to the patient that we will use lidocaine for local anaesthesia and moderate sedation.  I explained to them any risk of perforation of the vessel, stroke, heart attack, bleeding, death and myocardial infarction during the procedure.  The patient understood the risks and benefits and gave informed consent and would like to go ahead with the procedure.    Patient agreed to use dual antiplatelet therapy.      PROCEDURES PERFORMED:     [facility]    PROCEDURE TECHNIQUE:        Femoral Access: Using modified Seldinger technique we approached common femoral artery using 6 Fr sheath access  Diagnostic coronary angiogram performed using a JL4 engaging left coronary and JR4 catheter engaging right coronary artery and performing angiogram. An angled pig tail catheter for LV gram     COMPLICATIONS:  None.      At the end of the procedure a radial band device was used for hemostasis.      EBL: 30 cc    Moderate Sedation:    ASA 2  Mallampati 2      An independent trained observer pushed medications at my direction. We monitored the patient's level of consciousness and vital signs/physiologic status throughout the procedure duration (see start and start times above).       HEMODYNAMICS:    LVEDP 15.  There was no gradient between the left ventricle and aorta.        ANGIOGRAM/CORONARY ARTERIOGRAM:         Right dominant system    1.  The left main coronary artery arising normal fashion from the left coronary cusp giving rise to the left anterior descending artery and the left circumflex artery.  There

## 2024-01-15 NOTE — CARE COORDINATION
Discharge Planning Note:  Chart reviewed for discharge needs and it appears that patient has minimal needs for discharge at this time.     Primary Care Physician is Joe Mcnally MD    Primary insurance is [employer]    Please notify case management if any discharge needs are identified.      Case management will continue to follow progress and update discharge plan as needed.

## 2024-01-16 VITALS
DIASTOLIC BLOOD PRESSURE: 75 MMHG | RESPIRATION RATE: 16 BRPM | BODY MASS INDEX: 31.11 KG/M2 | HEART RATE: 70 BPM | TEMPERATURE: 97.6 F | WEIGHT: 229.72 LBS | SYSTOLIC BLOOD PRESSURE: 127 MMHG | OXYGEN SATURATION: 97 % | HEIGHT: 72 IN

## 2024-01-16 LAB
GLUCOSE BLD-MCNC: 143 MG/DL (ref 70–99)
GLUCOSE BLD-MCNC: 199 MG/DL (ref 70–99)
PERFORMED ON: ABNORMAL
PERFORMED ON: ABNORMAL

## 2024-01-16 PROCEDURE — 6370000000 HC RX 637 (ALT 250 FOR IP): Performed by: STUDENT IN AN ORGANIZED HEALTH CARE EDUCATION/TRAINING PROGRAM

## 2024-01-16 PROCEDURE — 6370000000 HC RX 637 (ALT 250 FOR IP): Performed by: INTERNAL MEDICINE

## 2024-01-16 PROCEDURE — 2580000003 HC RX 258: Performed by: INTERNAL MEDICINE

## 2024-01-16 PROCEDURE — 6360000002 HC RX W HCPCS: Performed by: INTERNAL MEDICINE

## 2024-01-16 RX ORDER — CARVEDILOL 6.25 MG/1
6.25 TABLET ORAL 2 TIMES DAILY WITH MEALS
Qty: 60 TABLET | Refills: 3 | Status: SHIPPED | OUTPATIENT
Start: 2024-01-16

## 2024-01-16 RX ADMIN — CLOPIDOGREL 75 MG: 75 TABLET, FILM COATED ORAL at 09:08

## 2024-01-16 RX ADMIN — CARVEDILOL 6.25 MG: 6.25 TABLET, FILM COATED ORAL at 09:08

## 2024-01-16 RX ADMIN — ISOSORBIDE MONONITRATE 60 MG: 60 TABLET, EXTENDED RELEASE ORAL at 09:08

## 2024-01-16 RX ADMIN — IBUPROFEN 400 MG: 400 TABLET, FILM COATED ORAL at 09:08

## 2024-01-16 RX ADMIN — IBUPROFEN 400 MG: 400 TABLET, FILM COATED ORAL at 14:35

## 2024-01-16 RX ADMIN — LISINOPRIL 20 MG: 20 TABLET ORAL at 09:08

## 2024-01-16 RX ADMIN — Medication 10 ML: at 09:27

## 2024-01-16 RX ADMIN — TAMSULOSIN HYDROCHLORIDE 0.4 MG: 0.4 CAPSULE ORAL at 09:08

## 2024-01-16 RX ADMIN — ASPIRIN 81 MG 81 MG: 81 TABLET ORAL at 09:08

## 2024-01-16 RX ADMIN — ENOXAPARIN SODIUM 30 MG: 100 INJECTION SUBCUTANEOUS at 09:08

## 2024-01-16 RX ADMIN — Medication 10 ML: at 09:26

## 2024-01-16 NOTE — DISCHARGE INSTRUCTIONS
Follow up with your PCP within 5-7 days of discharge.  Follow up with cardiology as instructed   Take all your medications as prescribed.        Coronary Angiogram: About This Test  What is a coronary angiogram?     A coronary angiogram is a test to look at the large blood vessels of your heart (coronary arteries). These blood vessels feed blood, oxygen, and nutrients to your heart.  Why is this test done?  This test is done to check blood flow in your coronary arteries. It can show the size and location of narrowed or blocked sections of an artery.  It's done for people who have coronary artery disease, also known as heart disease. The test can show how serious the disease is and how best to treat it. Or it can be done for people who have symptoms of heart disease to find out if there is a problem with the artery.  The Doctor can look at the shape of your heart, the motion of the heart, and valves in the heart.   What happens during the test?  You will get medicine to help you relax.  A thin tube called a catheter is put into a blood vessel in your groin or arm.  You will get a shot to numb the skin where the catheter goes in. You may feel pressure when the doctor moves the catheter through your blood vessel into your heart.  Dye is put into your coronary arteries through the catheter. Your doctor can see the dye as it moves through the arteries. This lets your doctor look for areas that are narrowed or blocked.  You may feel hot or flushed for several seconds when the dye is put in.  How long does it take?  The test will take about 30 minutes to an hour. But you need time to get ready for it and time to recover. If a problem is found and the doctor treats it, it can take a few hours longer.     Care of your puncture site:  Remove bandage 24 hours after the procedure.  May shower in 24 hours but do not sit in a bathtub/pool of water for 5 days or until the wound is healed.  Inspect the site daily and gently clean

## 2024-01-16 NOTE — PROGRESS NOTES
Date of Admission: 1/13/2024. Hospital Day: 2     a 83 y.o. male with a PMH of coronary disease status post CABG, status post carotid endarterectomy, chronic systolic heart failure EF of 43% on last echo, hypertension hyperlipidemia, type 2 diabetes who presents to the ED with concerns of chest tightness.  Assessment/Plan:    Active Hospital Problems    Diagnosis     Chest pain of uncertain etiology [R07.9]      83 y.o. male with a PMH of coronary disease status post CABG, status post carotid endarterectomy, chronic systolic heart failure EF of 43% on last echo, hypertension hyperlipidemia, type 2 diabetes who presents to the ED with concerns of chest tightness.         # Chest pain  Patient complains of chest pain.  Patient has an extensive cardiac history  Troponins 23/20.  proBNP 580.  EKG showed no acute changes  Plan : stress test vs [facility] tomorrow based on cardio rec       # History of CAD status post CABG  Resume home medications     # History of carotid endarterectomy  Complained of mild confusion, now resolved  CT head showed no acute intracranial abnormality       #Hypertension/Hyperlipidemia  On medications.  Resume  Monitor hemodynamics     #DM2  On oral hypoglycemics -metformin  Monitor blood sugar before every meal nightly  Blood sugar goals 120-140         DVT Prophylaxis:    Diet: ADULT DIET; Regular; 4 carb choices (60 gm/meal); No Caffeine  Code Status: Full Code      Dispo - home    All  follow up labs and imaging personally reviewed      Chief Complaint:   Chief Complaint   Patient presents with    Shortness of Breath     Patient states he was at the grocery store. Patient started having SOB, neck pain, dizziness, and confusion. Patient has chest tightness         Interval  History:   No CP today but does report some heaviness while taking a deep breath      Medications:  Reviewed    Infusion Medications    sodium chloride      dextrose       Scheduled Medications    aspirin  81 mg Oral Daily 
    Date of Admission: 1/13/2024. Hospital Day: 3     a 83 y.o. male with a PMH of coronary disease status post CABG, status post carotid endarterectomy, chronic systolic heart failure EF of 43% on last echo, hypertension hyperlipidemia, type 2 diabetes who presents to the ED with concerns of chest tightness.  LHC today, expected dc 1/16  Assessment/Plan:    Active Hospital Problems    Diagnosis     Acute chest pain [R07.9]      83 y.o. male with a PMH of coronary disease status post CABG, status post carotid endarterectomy, chronic systolic heart failure EF of 43% on last echo, hypertension hyperlipidemia, type 2 diabetes who presents to the ED with concerns of chest tightness.         # Chest pain  Patient complains of chest pain.  Patient has an extensive cardiac history  Troponins 23/20.  proBNP 580.  EKG showed no acute changes  Undergoing LHC today        # History of CAD status post CABG  Resume home medications     # History of carotid endarterectomy  Complained of mild confusion, now resolved  CT head showed no acute intracranial abnormality       #Hypertension/Hyperlipidemia  On medications.  Resume  Monitor hemodynamics     #DM2  On oral hypoglycemics -metformin  Monitor blood sugar before every meal nightly  Blood sugar goals 120-140         DVT Prophylaxis:    Diet: ADULT DIET; Regular; Low Fat/Low Chol/High Fiber/2 gm Na  Code Status: Full Code      Dispo - home    All  follow up labs and imaging personally reviewed      Chief Complaint:   Chief Complaint   Patient presents with    Shortness of Breath     Patient states he was at the grocery store. Patient started having SOB, neck pain, dizziness, and confusion. Patient has chest tightness         Interval  History:   No CP today but does report some heaviness while taking a deep breath      Medications:  Reviewed    Infusion Medications    sodium chloride      sodium chloride      dextrose       Scheduled Medications    sodium chloride flush  5-40 mL 
  [facility] Daily Progress Note      Admit Date:  1/13/2024    Chief Complaint:  shortness of breath     Subjective:  Mr. Mckinney 84 yo male with hx of CABG 2002 and PCI 7/22 came to the ER yesterday after having a spell in the grocery with chest tightness and bilateral arm tingling similar to what he had in the past before his PCIs. His daughter was with him and stated that he was not acting himself, not interested in shopping but just wanted to get home. After he was home he called her and told her he needed to come to the hospital to be checked. He has had some angina after PCI which led to a stress test 5/23 which showed a stable fixed defect. EF 43% He lives alone and is fairly sedentary except for short walks with the dog. His initial trop was mildly elevated at 23 but subsequent ones were normal. BNP was near normal at 580 with no evidence of CHF. He has a hx of Lt CEA and head CT was unremarkable. He does not appear to have any neurologic problem when speaking to me today. .      Patient is new to me this admission.    Objective:   BP (!) 179/94   Pulse 64   Temp 97.8 °F (36.6 °C) (Temporal)   Resp 16   Ht 1.829 m (6')   Wt 105.2 kg (231 lb 14.4 oz)   SpO2 96%   BMI 31.45 kg/m²     Intake/Output Summary (Last 24 hours) at 1/15/2024 0810  Last data filed at 1/14/2024 1300  Gross per 24 hour   Intake 200 ml   Output 400 ml   Net -200 ml       Physical Exam:  General:  Awake, alert, oriented x {0-3:[id number]}, NAD  Skin:  Warm and dry  Neck:  JVD ***  Chest:  {Exam; chest auscultation:02920}  Cardiovascular:  RRR S1S2, no S3, {MURMUR GRADE AND LOCATION:51890}  Abdomen:  Soft, ND, NT, No HSM  Extremities:  {NUMBERS 1+-4- WITH NO:58076} edema    Medications:    carvedilol  6.25 mg Oral BID WC    aspirin  81 mg Oral Daily    atorvastatin  80 mg Oral Nightly    clopidogrel  75 mg Oral Daily    isosorbide mononitrate  60 mg Oral Daily    ibuprofen  400 mg Oral TID    lisinopril  20 mg Oral BID    
Bps elevated at this time. See flowsheet. Denies HA, no flushing, denies SOB. Md made aware. NNO at this time.   
[facility]  Cardiology Consult    Mitchel Mckinney  1940    January 15, 2024      Reason for Referral: CAD    CC: CP      Subjective:     History of Present Illness:    Mitchel Mckinney is a 83 y.o. patient with a PMH significant for CAD, CP, PAD presented with complaints of Chest pain.         Past Medical History:   has a past medical history of Acquired spondylolisthesis, Arthritis, CAD (coronary artery disease), Carotid disease, bilateral (HCC), Cholelithiasis, Chronic systolic heart failure (HCC), Diabetes mellitus type II, Essential hypertension, Hyperlipidemia, Obesity (BMI 30.0-34.9), Prolonged emergence from general anesthesia, Spinal stenosis of lumbar region, and Status post AAA (abdominal aortic aneurysm) repair.    Surgical History:   has a past surgical history that includes Umbilical hernia repair (1987); Hip Arthroplasty (Left, 1999); Carotid endarterectomy (Left, 2006); AAA repair, endovascular (2009); Coronary artery bypass graft (2002); Vasectomy (1975); cyst removal (Right, 1997); Thyroid surgery (2012); Retinal detachment surgery (Left, 2004); Coronary angioplasty with stent (2009); Rotator cuff repair (Left, 2/24/14); Inguinal hernia repair (Left, 1985); Elbow surgery (Left, 1984); Total knee arthroplasty (Right, 09/15/2015); other surgical history (03/06/2018); Revision total hip arthroplasty (Left, 9/23/2019); Carpal tunnel release (Right, 11/20/2020); and Carpal tunnel release (Left, 12/18/2020).     Social History:   reports that he quit smoking about 52 years ago. His smoking use included cigarettes. He started smoking about 67 years ago. He has a 45.0 pack-year smoking history. He has never used smokeless tobacco. He reports that he does not drink alcohol and does not use drugs.     Family History:  family history includes Breast Cancer in his sister; Cancer in his brother; Heart Disease in his brother, father, and sister.    Home Medications:  Were reviewed and are listed in 
PATIENT HISTORY    ECHO: DATE: 5/05/2023       EF:  40-45%  STRESS TEST PREFORMED:  Yes FINDINGS:  Stress Nuclear: Date:  5/05/2023  Result:  Positive: Low     EF: 43%  EKG: Yes    ECG     Result: Normal  Pre CATH Rhythm: Normal Sinus Rhythm  HYPERTENSION: Yes  DYSLIPIDEMIA: Yes  FAMILY HX OF CAD: Yes  PRIOR MI: No  PRIOR PCI: Yes.  Most recent date: 6/08/2010 7/13/2022  PRIOR CABG: Yes.  Most recent date: 01/01/2002  CEREBROVASCULAR DX: No  PERIPHERAL ARTERIAL DISEASE: Yes  CHRONIC LUNG DISEASE: No  TOBACCO: Never  DIABETIC: Metformin Last Dose: 1/13/2024  CARDIAC ARREST: {No  DIALYSIS: No  HEART FAILURE: No  FRAILTY SCORE: 5 MILDLY FRAIL (more evident slowing, need help with IADLs finances, heavy housework, transport, meds)  CARDIAC CTA PREFORMED:  No  [place] CORONARY CALCIUM SCORE:   Assessed: No  Prior Diagnostic Coronary Angioplasty Procedure:  Yes    Most Recent Date: 7/13/2022    Results: Obstructive    
Pharmacy Home Medication Reconciliation Note    A medication reconciliation has been completed for Mitchel Mckinney 1940    Pharmacy: [address]  Information provided by: patient    The patient's home medication list is as follows:  No current facility-administered medications on file prior to encounter.     Current Outpatient Medications on File Prior to Encounter   Medication Sig Dispense Refill    blood glucose test strips (TRUE METRIX BLOOD GLUCOSE TEST) strip USE TO TEST BLOOD SUGAR DAILY AS NEEDED 100 strip 3    tamsulosin (FLOMAX) 0.4 MG capsule TAKE 1 CAPSULE EVERY DAY (Patient taking differently: Take 1 capsule by mouth daily TAKE 1 CAPSULE EVERY DAY) 60 capsule 3    atorvastatin (LIPITOR) 80 MG tablet TAKE 1 TABLET EVERY NIGHT (Patient taking differently: Take 1 tablet by mouth nightly) 90 tablet 10    clopidogrel (PLAVIX) 75 MG tablet TAKE 1 TABLET EVERY DAY (NEED MD APPOINTMENT) (Patient taking differently: Take 1 tablet by mouth daily TAKE 1 TABLET EVERY DAY (NEED MD APPOINTMENT)) 90 tablet 1    metFORMIN (GLUCOPHAGE) 1000 MG tablet TAKE 1 TABLET EVERY NIGHT (Patient taking differently: Take 1 tablet by mouth nightly) 90 tablet 1    isosorbide mononitrate (IMDUR) 60 MG extended release tablet Take 1 tablet by mouth daily 90 tablet 3    ezetimibe (ZETIA) 10 MG tablet TAKE 1 TABLET EVERY DAY (Patient not taking: Reported on 1/13/2024) 90 tablet 0    metoprolol tartrate (LOPRESSOR) 25 MG tablet TAKE 1 TABLET TWICE DAILY (Patient taking differently: Take 1 tablet by mouth 2 times daily) 180 tablet 3    lisinopril (PRINIVIL;ZESTRIL) 20 MG tablet Take 1 tablet by mouth 2 times daily 180 tablet 3    hydroCHLOROthiazide (HYDRODIURIL) 25 MG tablet Take 1 tablet by mouth daily (Patient not taking: Reported on 1/13/2024)      Blood Glucose Calibration (ACCU-CHEK THOMAS) SOLN Use as needed for blood sugar checks 1 each 1    TRUEplus Lancets 33G MISC CHECK BLOOD SUGAR 4 TIMES 
Pt transported to cath lab.   
injection 1 mg, PRN  dextrose 10 % infusion, Continuous PRN  insulin lispro (HUMALOG) injection vial 0-8 Units, TID WC  insulin lispro (HUMALOG) injection vial 0-4 Units, Nightly        Allergies:  Latex and Iodine           Review of Systems: SEE HPI   Constitutional: no unanticipated weight loss. There's been no change in energy level, sleep pattern, or activity level. No fevers, chills.   Eyes: No visual changes or diplopia. No scleral icterus.  ENT: No Headaches, hearing loss or vertigo. No mouth sores or sore throat.  Cardiovascular: No Chest pain, tightness or discomfort.   No Shortness of breath.   No Dyspnea on exertion, Orthopnea, Paroxysmal nocturnal dyspnea or breathlessness at rest.  No Palpitations.  No Syncope ('blackouts', 'faints', 'collapse') or dizziness.   Respiratory: No cough or wheezing, no sputum production. No hematemesis.    Gastrointestinal: No abdominal pain, appetite loss, blood in stools. No change in bowel or bladder habits.  Genitourinary: No dysuria, trouble voiding, or hematuria.  Musculoskeletal:  No gait disturbance, no joint complaints.  Integumentary: No rash or pruritis.  Neurological: No headache, diplopia, change in muscle strength, numbness or tingling.   Psychiatric: No anxiety or depression.  Endocrine: No temperature intolerance. No excessive thirst, fluid intake, or urination. No tremor.  Hematologic/Lymphatic: No abnormal bruising or bleeding, blood clots or swollen lymph nodes.  Allergic/Immunologic: No nasal congestion or hives.      Objective:     PHYSICAL EXAM:      Vitals:    01/16/24 1200   BP: 127/75   Pulse: 70   Resp: 16   Temp: 97.6 °F (36.4 °C)   SpO2: 97%    Weight - Scale: 104.2 kg (229 lb 11.5 oz)       General Appearance:  Alert, cooperative, no distress, appears stated age.   Head:  Normocephalic, without obvious abnormality, atraumatic.   Eyes:  Pupils equal and round. No scleral icterus.   Mouth: Moist mucosa, no pharyngeal erythema.   Nose: Nares

## 2024-01-16 NOTE — PLAN OF CARE
Problem: Discharge Planning  Goal: Discharge to home or other facility with appropriate resources  1/16/2024 1543 by Lalitha Salas RN  Outcome: Completed  1/16/2024 0912 by Lalitha Salas RN  Outcome: Progressing     Problem: Safety - Adult  Goal: Free from fall injury  1/16/2024 1543 by Lalitha Salas RN  Outcome: Completed  1/16/2024 0912 by Lalitha Salas RN  Outcome: Progressing     Problem: ABCDS Injury Assessment  Goal: Absence of physical injury  1/16/2024 1543 by Lalitha Salas RN  Outcome: Completed  1/16/2024 0912 by Lalitha Salas RN  Outcome: Progressing     Problem: Pain  Goal: Verbalizes/displays adequate comfort level or baseline comfort level  1/16/2024 1543 by Lalitha Salas RN  Outcome: Completed  1/16/2024 0912 by Lalitha Salas RN  Outcome: Progressing     Problem: Chronic Conditions and Co-morbidities  Goal: Patient's chronic conditions and co-morbidity symptoms are monitored and maintained or improved  1/16/2024 1543 by Lalitha Salas RN  Outcome: Completed  1/16/2024 0912 by Lalitha Salas RN  Outcome: Progressing

## 2024-01-17 ENCOUNTER — CARE COORDINATION (OUTPATIENT)
Dept: CASE MANAGEMENT | Age: 84
End: 2024-01-17

## 2024-01-17 DIAGNOSIS — R07.9 ACUTE CHEST PAIN: Primary | ICD-10-CM

## 2024-01-17 PROCEDURE — 1111F DSCHRG MED/CURRENT MED MERGE: CPT | Performed by: FAMILY MEDICINE

## 2024-01-17 NOTE — CARE COORDINATION
Care Transitions Initial Follow Up Call    Call within 2 business days of discharge: Yes      First attempt at 24 hour discharge call, no answer, CTN left VM with contact information and request for return call.  Patient was attempting to reach CTN while CTN was leaving VM for patient.    CTN made an additional attempt, no answer, did not leave a second VM.    Follow Up  Future Appointments   Date Time Provider Department Center   1/25/2024  1:00 PM Deborah Noland APRN - CNP  [facility]   3/11/2024  7:45 AM Aquiles Foley MD  [facility]       FIOR BACH, ROB     
MD BARR[facility]       Care Transition Nurse provided contact information.  Plan for follow-up call in 5-7 days based on severity of symptoms and risk factors.  Plan for next call: symptom management-.  self management-.  follow-up appointment-.    FIOR BACH RN

## 2024-01-22 NOTE — PROGRESS NOTES
[facility]     Outpatient Follow Up Note    CHIEF COMPLAINT / HPI: Hospital Follow Up secondary to status post coronary artery stenting    Hospital record has been reviewed  Hospital Course progressed as follows per discharge summary:     Hospital Course: This 83-year-old male with PMHx of CAD s/p CABG, hypertension, hyperlipidemia and diabetes mellitus presented with chest pain     Chest Pain:resolved   Cardiology consulted; underwent LHC which did not reveal any significant ischemic culprits     Hx of CAD; s/p CABG PCI of RCA, severe disease of SVG to PDA  Continue DAPT statin and beta-blockers     Hx of carotid stenosis; carotid endarterectomy.  Continue medical management      Hypertension: Continue current regimen monitor BP closely     Hx diabetes mellitus; continue current regimen and monitor BG closely     Hyperlipidemia; continue statins     Mitchel Mckinney is 83 y.o. male who presents today for a routine follow up after a recent hospitalization related to the above mentioned issues.  Subjective:   Mitchel Mckinney has a significant past medical history of CAD/ s/p CABG 02/ hypertension/ and hyperlipidemia AAA s/p repair 2009 and carotid stenosis s/p LCEA 2006. Hospitalized July 2022 for ACS.  July 13, 2022, PCI of RCA.    Prior to admit pt was feeling bilateral arm discomfort, mid tightness in chest, pain in neck, fatigued, and dizziness.      Today, he denies significant chest pain. No further chest tightness and arm pain resolved.  Still with SOB/MAGANA. Felt sob while sitting in his chair today while waiting to be picked up for appt. Was also very breathless walking into office today. He never usually walks that far.  Walks his dog but doesn't go very far.  Does stairs in his house but is hard for him. His breathing has been like this for maybe about a year, stable not worsening.   Apparently needs bilateral shoulder surgery and had been attributing arm pain to that.  Has persistent mid neck

## 2024-01-24 ENCOUNTER — CARE COORDINATION (OUTPATIENT)
Dept: CASE MANAGEMENT | Age: 84
End: 2024-01-24

## 2024-01-24 RX ORDER — LISINOPRIL 20 MG/1
20 TABLET ORAL 2 TIMES DAILY
Qty: 180 TABLET | Refills: 3 | Status: SHIPPED | OUTPATIENT
Start: 2024-01-24

## 2024-01-24 NOTE — CARE COORDINATION
Care Transitions Follow Up Call    Patient Current Location:  Home: [address] [facility] [address]    Care Transition Nurse contacted the patient by telephone.  Verified name and  with patient as identifiers.    Patient: Mitchel Mckinney  Patient : 1940   MRN: [id number]  Reason for Admission: Chest pain, [facility]  Discharge Date: 24 RARS: Readmission Risk Score: 9.9      Needs to be reviewed by the provider   Additional needs identified to be addressed with provider: No  none             Method of communication with provider: none.    Patient reports that he is doing very well.  He denies any chest pain, SOB, cough, fever.  He will follow up with cardiology tomorrow and denies any questions or concerns at this time.  CTN will continue with outreach follow up calls.      Follow Up  Future Appointments   Date Time Provider Department Center   2024  1:00 PM Deborah Noland APRN - CNP  [facility]   3/11/2024  7:45 AM Aquiles Foley MD  Card[facility]     External follow up appointment(s):     Care Transition Nurse reviewed red flags with patient and discussed any barriers to care and/or understanding of plan of care after discharge. Discussed appropriate site of care based on symptoms and resources available to patient including: PCP  Urgent care clinics  When to call 911. The patient agrees to contact the PCP office for questions related to their healthcare.       Offered patient enrollment in the Remote Patient Monitoring (RPM) program for in-home monitoring:  Will offer on f/u call HTN, HF, DM . .     Care Transitions Subsequent and Final Call    Subsequent and Final Calls  Do you have any ongoing symptoms?: No  Have your medications changed?: No  Do you have any questions related to your medications?: No  Do you currently have any active services?: No  Do you have any needs or concerns that I can assist you with?: No  Identified Barriers: None  Care Transitions Interventions  CHF Nurse:

## 2024-01-24 NOTE — TELEPHONE ENCOUNTER
Last OV: 09/052023  Alaina  Last Labs:Bmp-01/15/2024    Next OV: 03/11/2024  Alaina  Last Refill: Lisinopril-03/06/2023  Alaina

## 2024-01-25 ENCOUNTER — TELEPHONE (OUTPATIENT)
Dept: FAMILY MEDICINE CLINIC | Age: 84
End: 2024-01-25

## 2024-01-25 ENCOUNTER — OFFICE VISIT (OUTPATIENT)
Dept: CARDIOLOGY CLINIC | Age: 84
End: 2024-01-25
Payer: MEDICARE

## 2024-01-25 ENCOUNTER — TELEPHONE (OUTPATIENT)
Dept: CARDIOLOGY CLINIC | Age: 84
End: 2024-01-25

## 2024-01-25 VITALS
WEIGHT: 227 LBS | HEART RATE: 75 BPM | HEIGHT: 72 IN | OXYGEN SATURATION: 95 % | BODY MASS INDEX: 30.75 KG/M2 | SYSTOLIC BLOOD PRESSURE: 146 MMHG | DIASTOLIC BLOOD PRESSURE: 80 MMHG

## 2024-01-25 DIAGNOSIS — Z09 HOSPITAL DISCHARGE FOLLOW-UP: ICD-10-CM

## 2024-01-25 DIAGNOSIS — I65.23 BILATERAL CAROTID ARTERY STENOSIS: ICD-10-CM

## 2024-01-25 DIAGNOSIS — R42 LIGHTHEADED: ICD-10-CM

## 2024-01-25 DIAGNOSIS — E78.2 MIXED HYPERLIPIDEMIA: ICD-10-CM

## 2024-01-25 DIAGNOSIS — I10 ESSENTIAL HYPERTENSION, BENIGN: ICD-10-CM

## 2024-01-25 DIAGNOSIS — R42 DIZZINESS: ICD-10-CM

## 2024-01-25 DIAGNOSIS — I25.10 CORONARY ARTERY DISEASE INVOLVING NATIVE CORONARY ARTERY OF NATIVE HEART, UNSPECIFIED WHETHER ANGINA PRESENT: Primary | ICD-10-CM

## 2024-01-25 DIAGNOSIS — R06.83 SNORES: ICD-10-CM

## 2024-01-25 DIAGNOSIS — R53.83 FATIGUE, UNSPECIFIED TYPE: ICD-10-CM

## 2024-01-25 PROCEDURE — 1036F TOBACCO NON-USER: CPT | Performed by: NURSE PRACTITIONER

## 2024-01-25 PROCEDURE — G8427 DOCREV CUR MEDS BY ELIG CLIN: HCPCS | Performed by: NURSE PRACTITIONER

## 2024-01-25 PROCEDURE — 3078F DIAST BP <80 MM HG: CPT | Performed by: NURSE PRACTITIONER

## 2024-01-25 PROCEDURE — 99214 OFFICE O/P EST MOD 30 MIN: CPT | Performed by: NURSE PRACTITIONER

## 2024-01-25 PROCEDURE — 1111F DSCHRG MED/CURRENT MED MERGE: CPT | Performed by: NURSE PRACTITIONER

## 2024-01-25 PROCEDURE — G8484 FLU IMMUNIZE NO ADMIN: HCPCS | Performed by: NURSE PRACTITIONER

## 2024-01-25 PROCEDURE — G8417 CALC BMI ABV UP PARAM F/U: HCPCS | Performed by: NURSE PRACTITIONER

## 2024-01-25 PROCEDURE — 1123F ACP DISCUSS/DSCN MKR DOCD: CPT | Performed by: NURSE PRACTITIONER

## 2024-01-25 PROCEDURE — 3074F SYST BP LT 130 MM HG: CPT | Performed by: NURSE PRACTITIONER

## 2024-01-25 RX ORDER — EZETIMIBE 10 MG/1
10 TABLET ORAL DAILY
Qty: 90 TABLET | Refills: 0 | Status: CANCELLED | OUTPATIENT
Start: 2024-01-25

## 2024-01-25 NOTE — TELEPHONE ENCOUNTER
Medication Refill    Medication needing refilled:  ezetimibe (ZETIA)   Dosage of the medication:  10 MG   How are you taking this medication (QD, BID, TID, QID, PRN):    30 or 90 day supply called in:  90  When will you run out of your medication:    Which Pharmacy are we sending the medication to?:  [id number] - [address] -  [phone] - F [phone]  [address] [place] [id number]  Phone: [phone]  Fax: [phone]

## 2024-01-25 NOTE — TELEPHONE ENCOUNTER
Care Transitions Initial Follow Up Call    Outreach made within 2 business days of discharge: Yes    Patient: Mitchel Mckinney Patient : 1940   MRN: [id number]  Reason for Admission: There are no discharge diagnoses documented for the most recent discharge.  Discharge Date: 24       Spoke with: MARYBETH Jay MA

## 2024-01-25 NOTE — PATIENT INSTRUCTIONS
No medication changes. Verify if you are taking zetia or not. Would recommend taking if you indeed are not.     Update carotid duplex

## 2024-01-25 NOTE — TELEPHONE ENCOUNTER
Pt returned MA's call.  Pt is following up today with his Cardiologist.  Pt didn't feel he needed another appt with us - advise.

## 2024-01-25 NOTE — TELEPHONE ENCOUNTER
Received refill request for Zetia from [facility].    Last ov: 01/25/2024 LES    Last Refill: 07/06/2023 #90 w/ 0 refills    Next appointment: 03/11/2024 LES

## 2024-01-26 RX ORDER — EZETIMIBE 10 MG/1
10 TABLET ORAL DAILY
Qty: 90 TABLET | Refills: 0 | Status: SHIPPED | OUTPATIENT
Start: 2024-01-26

## 2024-01-29 ENCOUNTER — HOSPITAL ENCOUNTER (OUTPATIENT)
Dept: VASCULAR LAB | Age: 84
Discharge: HOME | End: 2024-01-29
Payer: MEDICARE

## 2024-01-29 DIAGNOSIS — R42 LIGHTHEADED: ICD-10-CM

## 2024-01-29 DIAGNOSIS — I65.23 BILATERAL CAROTID ARTERY STENOSIS: ICD-10-CM

## 2024-01-29 DIAGNOSIS — R42 DIZZINESS: ICD-10-CM

## 2024-01-29 PROCEDURE — 93880 EXTRACRANIAL BILAT STUDY: CPT

## 2024-01-31 ENCOUNTER — CARE COORDINATION (OUTPATIENT)
Dept: CASE MANAGEMENT | Age: 84
End: 2024-01-31

## 2024-01-31 NOTE — CARE COORDINATION
Care Transitions Follow Up Call    Patient Current Location:  Home: [address] [facility] [address]    Care Transition Nurse contacted the patient by telephone.  Verified name and  with patient as identifiers.    Patient: Mitchel Mckinney  Patient : 1940   MRN: [id number]  Reason for Admission: Chest pain  Discharge Date: 24 RARS: Readmission Risk Score: 9.9      Needs to be reviewed by the provider   Additional needs identified to be addressed with provider: No  none             Method of communication with provider: none.    Patient reports that he is doing well, does report some lightheadedness.  Patient does confirm that he is taking Zetia as directed, cardiology had question about this on 24 visit. He did complete carotid duplex study 24 and findings were: There is <50% stenosis of the bilateral internal carotid arteries. Patient denies any chest pain, SOB, fever, cough.  He denies any questions or concerns at this time and will follow up with Dr. Foley 3/11/24.  CTN will continue with outreach follow up calls.      Follow Up  Future Appointments   Date Time Provider Department Center   3/11/2024  7:45 AM Aquiles Foley MD FF Cardio [facility]     External follow up appointment(s):     Care Transition Nurse reviewed red flags with patient and discussed any barriers to care and/or understanding of plan of care after discharge. Discussed appropriate site of care based on symptoms and resources available to patient including: PCP  Urgent care clinics  When to call 911. The patient agrees to contact the PCP office for questions related to their healthcare.       Offered patient enrollment in the Remote Patient Monitoring (RPM) program for in-home monitoring: WIll offer on f/u call HTN, HF, DM.       Care Transitions Subsequent and Final Call    Subsequent and Final Calls  Do you have any ongoing symptoms?: No  Have your medications changed?: No  Do you have any questions related to your

## 2024-02-01 ENCOUNTER — TELEPHONE (OUTPATIENT)
Dept: CARDIOLOGY CLINIC | Age: 84
End: 2024-02-01

## 2024-02-01 NOTE — TELEPHONE ENCOUNTER
Looks good. No change from prior imaging. No further recommendations at this time. F/u as planned.

## 2024-02-01 NOTE — TELEPHONE ENCOUNTER
Per note under the results in imaging on 1-, There is <50% stenosis of the bilateral internal carotid arteries. Spoke to pt, he wants to know what he needs to do from here. Please advise.

## 2024-02-07 ENCOUNTER — CARE COORDINATION (OUTPATIENT)
Dept: CASE MANAGEMENT | Age: 84
End: 2024-02-07

## 2024-02-13 ENCOUNTER — CARE COORDINATION (OUTPATIENT)
Dept: CASE MANAGEMENT | Age: 84
End: 2024-02-13

## 2024-02-13 NOTE — CARE COORDINATION
dizziness  Interventions to address risk factors: Obtained and reviewed discharge summary and/or continuity of care documents and fall education, no driving and carrying phone with him at all times due to lives alone.     Offered patient enrollment in the Remote Patient Monitoring (RPM) program for in-home monitoring: Patient declined.     Care Transitions Subsequent and Final Call    Schedule Follow Up Appointment with PCP: Completed  Subsequent and Final Calls  Do you have any ongoing symptoms?: Yes  Patient-reported symptoms: Other  Interventions for patient-reported symptoms: Notified PCP/Physician  Have your medications changed?: No  Do you have any questions related to your medications?: No  Do you currently have any active services?: No  Do you have any needs or concerns that I can assist you with?: No  Identified Barriers: None  Care Transitions Interventions  No Identified Needs  CHF Nurse: Completed      Other Interventions:             Care Transition Nurse provided contact information for future needs. Plan for follow-up call in 3-5 days based on severity of symptoms and risk factors.  Plan for next call: symptom management-related to dizziness  Did cardio contact pt?    Genevieve Perez RN

## 2024-02-16 ENCOUNTER — CARE COORDINATION (OUTPATIENT)
Dept: CASE MANAGEMENT | Age: 84
End: 2024-02-16

## 2024-02-16 NOTE — CARE COORDINATION
Care Transitions Follow Up Call    Patient Current Location:  Home: [address] [facility] [address]    Care Transition Nurse contacted the patient by telephone to follow up after admission.  Verified name and  with patient as identifiers.    Patient: Mitchel Mckinney  Patient : 1940   MRN: [id number]  Reason for Admission:  Chest pain  Discharge Date: 24 RARS: Readmission Risk Score: 9.9      Needs to be reviewed by the provider   Additional needs identified to be addressed with provider: No  none             Method of communication with provider: none.      Pt doing well, minimal dizziness as noted last calls. Pt states he woke up yesterday with a sore/painful foot on the outside and top. Does not recall any injury. Encouraged to call PCP if  does not see improvement or escalates. Denies any numbness or tingling in these areas. Denies SOB. Would ayaan one more call to check in.     Addressed changes since last contact:  none  Discussed follow-up appointments. If no appointment was previously scheduled, appointment scheduling offered: No.   Is follow up appointment scheduled within 7 days of discharge? Yes.    Follow Up  Future Appointments   Date Time Provider Department Center   3/11/2024  7:45 AM Aquiles Foley MD FF Cardio [facility]       Care Transition Nurse reviewed discharge instructions with patient and discussed any barriers to care and/or understanding of plan of care after discharge. Discussed appropriate site of care based on symptoms and resources available to patient including: PCP. The patient agrees to contact the PCP office for questions related to their healthcare.     Patients top risk factors for readmission: falls and medical condition-CP  Interventions to address risk factors: Obtained and reviewed discharge summary and/or continuity of care documents    Offered patient enrollment in the Remote Patient Monitoring (RPM) program for in-home monitoring: Patient declined.     Care

## 2024-02-19 NOTE — CARE COORDINATION
Addended by: KALLI STEWART on: 2/19/2024 09:48 AM     Modules accepted: Orders     Follow up outreach call attempt, no answer.  CTN left VM with contact information and request for return call.  CTN will continue with outreach call attempts.

## 2024-02-21 NOTE — PROGRESS NOTES
visit and discussed.     Stop zetia and decrease lipitor to 40 mg daily and monitor for continued dizziness,poosibly overmedication with lipid lowering  Cardiac event monitor x 7 days due to c/o dizziness  Continue risk factor modifications.   Call for any change in symptoms, call to report any changes in shortness of breath or development of chest pain with activity.    Follow up in 3 mos          I appreciate the opportunity of cooperating in the care of this individual.    Aquiles Foley M.D., [facility]      Patient's problem list, medications, allergies, past medical, surgical, social and family histories were reviewed and updated as appropriate.    Scribe's attestation: This note was scribed in the presence of Dr Foley by Jeff Alex RN.    The tomasibe's documentation has been prepared under my direction and personally reviewed by me in its entirety. I confirm that the note above accurately reflects all work, treatment, procedures, and medical decision making performed by me.

## 2024-02-22 ENCOUNTER — CARE COORDINATION (OUTPATIENT)
Dept: CARE COORDINATION | Age: 84
End: 2024-02-22

## 2024-02-22 NOTE — CARE COORDINATION
Care Transitions Outreach Attempt    Attempted to reach patient for transitions of care follow up. Unable to reach patient.    Will attempt x1 more to check on foot injury. If stable or unable to reach, end episode.    Barbara Clarke LPN CC  Care Transitions  [phone]    Patient: Mitchel Mckinney Patient : 1940 MRN: [id number]    Last Discharge Facility       Date Complaint Diagnosis Description Type Department Provider    24 Shortness of Breath Acute chest pain ... ED to Hosp-Admission (Discharged) (ADMITTED) [facility] Halle Meza MD; Tomas Templeton...          Noted following upcoming appointments from discharge chart review:   [facility] follow up appointment(s):   Future Appointments   Date Time Provider Department Center   3/11/2024  7:45 AM Aquiles Foley MD FF Cardio [facility]     Non-[facility]  follow up appointment(s): NA

## 2024-02-23 ENCOUNTER — CARE COORDINATION (OUTPATIENT)
Dept: CARE COORDINATION | Age: 84
End: 2024-02-23

## 2024-02-23 NOTE — CARE COORDINATION
Care Transitions Follow Up Call    Patient Current Location:  Home: [address] [facility] [address]    LPN Care Coordinator contacted the patient by telephone to follow up after admission on -.  Verified name and  with patient as identifiers.    Patient: Mitchel Mckinney  Patient : 1940   MRN: [id number]  Reason for Admission: CP, s/p [facility]  Discharge Date: 24 RARS: Readmission Risk Score: 9.9      Needs to be reviewed by the provider   Additional needs identified to be addressed with provider: No  none             Method of communication with provider: none.    LPN CC spoke with patient. States he is pretty good. Denies SOB, HA, CP, palpitations, N/V/D, fever/chills. Has some dizziness with occ vision changes during dizzy spells. Denies falls. Reports he monitors BP occ and it is \"normal.\" No values given. LPN CC encouraged patient to monitor BP daily & take log to cardio f/u. Patient verbalized understanding. LPN CC also encouraged use of compression stockings & adequate hydration. LLE pain is resolved. Appetite good. Denies problems with bowels or bladder. Denies medication changes. Cardio f/u 3/11. Denies needs.   Barbara Clarke, JONATHAN CC  Care Transitions  [phone]    Addressed changes since last contact:  none  Discussed follow-up appointments. If no appointment was previously scheduled, appointment scheduling offered: Yes.   Is follow up appointment scheduled within 7 days of discharge? Yes.    Follow Up  Future Appointments   Date Time Provider Department Center   3/11/2024  7:45 AM Aquiles Foley MD FF Cardio [facility]     External follow up appointment(s): KEN    LPN Care Coordinator reviewed medical action plan and red flags with patient and discussed any barriers to care and/or understanding of plan of care after discharge. Discussed appropriate site of care based on symptoms and resources available to patient including: PCP  Specialist  Urgent care clinics  When to call

## 2024-03-11 ENCOUNTER — OFFICE VISIT (OUTPATIENT)
Dept: CARDIOLOGY CLINIC | Age: 84
End: 2024-03-11
Payer: MEDICARE

## 2024-03-11 VITALS
HEART RATE: 78 BPM | HEIGHT: 72 IN | DIASTOLIC BLOOD PRESSURE: 80 MMHG | BODY MASS INDEX: 31.02 KG/M2 | OXYGEN SATURATION: 99 % | WEIGHT: 229 LBS | SYSTOLIC BLOOD PRESSURE: 140 MMHG

## 2024-03-11 DIAGNOSIS — E78.2 MIXED HYPERLIPIDEMIA: ICD-10-CM

## 2024-03-11 DIAGNOSIS — I10 ESSENTIAL HYPERTENSION, BENIGN: ICD-10-CM

## 2024-03-11 DIAGNOSIS — R55 SYNCOPE AND COLLAPSE: Primary | ICD-10-CM

## 2024-03-11 DIAGNOSIS — I25.10 CORONARY ARTERY DISEASE INVOLVING NATIVE CORONARY ARTERY OF NATIVE HEART, UNSPECIFIED WHETHER ANGINA PRESENT: ICD-10-CM

## 2024-03-11 DIAGNOSIS — I65.23 BILATERAL CAROTID ARTERY STENOSIS: ICD-10-CM

## 2024-03-11 DIAGNOSIS — Z98.890 STATUS POST AAA (ABDOMINAL AORTIC ANEURYSM) REPAIR: ICD-10-CM

## 2024-03-11 DIAGNOSIS — Z86.79 STATUS POST AAA (ABDOMINAL AORTIC ANEURYSM) REPAIR: ICD-10-CM

## 2024-03-11 PROCEDURE — 3077F SYST BP >= 140 MM HG: CPT | Performed by: INTERNAL MEDICINE

## 2024-03-11 PROCEDURE — 1123F ACP DISCUSS/DSCN MKR DOCD: CPT | Performed by: INTERNAL MEDICINE

## 2024-03-11 PROCEDURE — 99214 OFFICE O/P EST MOD 30 MIN: CPT | Performed by: INTERNAL MEDICINE

## 2024-03-11 PROCEDURE — G8417 CALC BMI ABV UP PARAM F/U: HCPCS | Performed by: INTERNAL MEDICINE

## 2024-03-11 PROCEDURE — G8427 DOCREV CUR MEDS BY ELIG CLIN: HCPCS | Performed by: INTERNAL MEDICINE

## 2024-03-11 PROCEDURE — 1036F TOBACCO NON-USER: CPT | Performed by: INTERNAL MEDICINE

## 2024-03-11 PROCEDURE — 3079F DIAST BP 80-89 MM HG: CPT | Performed by: INTERNAL MEDICINE

## 2024-03-11 PROCEDURE — G8484 FLU IMMUNIZE NO ADMIN: HCPCS | Performed by: INTERNAL MEDICINE

## 2024-03-11 RX ORDER — ATORVASTATIN CALCIUM 40 MG/1
40 TABLET, FILM COATED ORAL NIGHTLY
Qty: 90 TABLET | Refills: 3 | Status: SHIPPED | OUTPATIENT
Start: 2024-03-11

## 2024-03-11 NOTE — PATIENT INSTRUCTIONS
Stop zetia  Decrease lipitor to 40 mg daily  Cardiac event monitor x 7 days   Continue risk factor modifications.   Call for any change in symptoms, call to report any changes in shortness of breath or development of chest pain with activity.    Follow up in 3 mos

## 2024-04-23 NOTE — TELEPHONE ENCOUNTER
Medication:   Requested Prescriptions     Pending Prescriptions Disp Refills    metFORMIN (GLUCOPHAGE) 1000 MG tablet [Pharmacy Med Name: [address] [place] [address] 1000 MG Tablet] 28 tablet      Sig: TAKE 1 TABLET EVERY NIGHT       Last Filled:  8/23/2023, 14, 0    Patient Phone Number: [phone] (home)     Last appt: 7/22/2022   Next appt: Brendan Zavala due for physical    Last Labs DM:   Lab Results   Component Value Date/Time    LABA1C 5.9 07/13/2022 07:15 AM
[email or web address]

## 2024-05-02 RX ORDER — EZETIMIBE 10 MG/1
10 TABLET ORAL DAILY
Qty: 90 TABLET | Refills: 0 | OUTPATIENT
Start: 2024-05-02

## 2024-05-02 NOTE — TELEPHONE ENCOUNTER
The original prescription was discontinued on 3/11/2024 by Jeff Alex RN for the following reason: LIST CLEANUP.

## 2024-06-09 ENCOUNTER — APPOINTMENT (OUTPATIENT)
Dept: CT IMAGING | Age: 84
End: 2024-06-09
Payer: MEDICARE

## 2024-06-09 ENCOUNTER — APPOINTMENT (OUTPATIENT)
Dept: GENERAL RADIOLOGY | Age: 84
End: 2024-06-09
Payer: MEDICARE

## 2024-06-09 ENCOUNTER — HOSPITAL ENCOUNTER (EMERGENCY)
Age: 84
Discharge: HOME | End: 2024-06-09
Payer: MEDICARE

## 2024-06-09 VITALS
DIASTOLIC BLOOD PRESSURE: 80 MMHG | TEMPERATURE: 98 F | OXYGEN SATURATION: 96 % | SYSTOLIC BLOOD PRESSURE: 123 MMHG | RESPIRATION RATE: 19 BRPM | HEART RATE: 71 BPM

## 2024-06-09 DIAGNOSIS — R55 SYNCOPE AND COLLAPSE: Primary | ICD-10-CM

## 2024-06-09 DIAGNOSIS — I65.21 STENOSIS OF RIGHT CAROTID ARTERY: ICD-10-CM

## 2024-06-09 LAB
ALBUMIN SERPL-MCNC: 3.8 G/DL (ref 3.4–5)
ALBUMIN/GLOB SERPL: 1.3 {RATIO} (ref 1.1–2.2)
ALP SERPL-CCNC: 131 U/L (ref 40–129)
ALT SERPL-CCNC: 9 U/L (ref 10–40)
ANION GAP SERPL CALCULATED.3IONS-SCNC: 9 MMOL/L (ref 3–16)
AST SERPL-CCNC: 13 U/L (ref 15–37)
BASOPHILS # BLD: 0.1 K/UL (ref 0–0.2)
BASOPHILS NFR BLD: 0.8 %
BILIRUB SERPL-MCNC: 0.5 MG/DL (ref 0–1)
BILIRUB UR QL STRIP.AUTO: NEGATIVE
BUN SERPL-MCNC: 17 MG/DL (ref 7–20)
CALCIUM SERPL-MCNC: 9.1 MG/DL (ref 8.3–10.6)
CHLORIDE SERPL-SCNC: 107 MMOL/L (ref 99–110)
CLARITY UR: CLEAR
CO2 SERPL-SCNC: 23 MMOL/L (ref 21–32)
COLOR UR: YELLOW
CREAT SERPL-MCNC: 1.2 MG/DL (ref 0.8–1.3)
DEPRECATED RDW RBC AUTO: 14.6 % (ref 12.4–15.4)
EOSINOPHIL # BLD: 0.2 K/UL (ref 0–0.6)
EOSINOPHIL NFR BLD: 2.5 %
EPI CELLS #/AREA URNS AUTO: 2 /HPF (ref 0–5)
GFR SERPLBLD CREATININE-BSD FMLA CKD-EPI: 59 ML/MIN/{1.73_M2}
GLUCOSE SERPL-MCNC: 133 MG/DL (ref 70–99)
GLUCOSE UR STRIP.AUTO-MCNC: NEGATIVE MG/DL
HCT VFR BLD AUTO: 40.9 % (ref 40.5–52.5)
HGB BLD-MCNC: 14.1 G/DL (ref 13.5–17.5)
HGB UR QL STRIP.AUTO: NEGATIVE
HYALINE CASTS #/AREA URNS AUTO: 10 /LPF (ref 0–8)
HYALINE CASTS: PRESENT
KETONES UR STRIP.AUTO-MCNC: NEGATIVE MG/DL
LEUKOCYTE ESTERASE UR QL STRIP.AUTO: ABNORMAL
LYMPHOCYTES # BLD: 1 K/UL (ref 1–5.1)
LYMPHOCYTES NFR BLD: 15.5 %
MCH RBC QN AUTO: 32.7 PG (ref 26–34)
MCHC RBC AUTO-ENTMCNC: 34.4 G/DL (ref 31–36)
MCV RBC AUTO: 95.3 FL (ref 80–100)
MONOCYTES # BLD: 0.4 K/UL (ref 0–1.3)
MONOCYTES NFR BLD: 5.7 %
NEUTROPHILS # BLD: 5 K/UL (ref 1.7–7.7)
NEUTROPHILS NFR BLD: 75.5 %
NITRITE UR QL STRIP.AUTO: NEGATIVE
PH UR STRIP.AUTO: 5 [PH] (ref 5–8)
PLATELET # BLD AUTO: 128 K/UL (ref 135–450)
PMV BLD AUTO: 9.3 FL (ref 5–10.5)
POTASSIUM SERPL-SCNC: 4.4 MMOL/L (ref 3.5–5.1)
PROT SERPL-MCNC: 6.8 G/DL (ref 6.4–8.2)
PROT UR STRIP.AUTO-MCNC: ABNORMAL MG/DL
RBC # BLD AUTO: 4.3 M/UL (ref 4.2–5.9)
RBC CLUMPS #/AREA URNS AUTO: 1 /HPF (ref 0–4)
SODIUM SERPL-SCNC: 139 MMOL/L (ref 136–145)
SP GR UR STRIP.AUTO: 1.02 (ref 1–1.03)
TROPONIN, HIGH SENSITIVITY: 10 NG/L (ref 0–22)
TROPONIN, HIGH SENSITIVITY: 23 NG/L (ref 0–22)
UA COMPLETE W REFLEX CULTURE PNL UR: ABNORMAL
UA DIPSTICK W REFLEX MICRO PNL UR: YES
URN SPEC COLLECT METH UR: ABNORMAL
UROBILINOGEN UR STRIP-ACNC: 1 E.U./DL
WBC # BLD AUTO: 6.7 K/UL (ref 4–11)
WBC #/AREA URNS AUTO: 9 /HPF (ref 0–5)

## 2024-06-09 PROCEDURE — 2580000003 HC RX 258: Performed by: PHYSICIAN ASSISTANT

## 2024-06-09 PROCEDURE — 71045 X-RAY EXAM CHEST 1 VIEW: CPT

## 2024-06-09 PROCEDURE — 70450 CT HEAD/BRAIN W/O DYE: CPT

## 2024-06-09 PROCEDURE — 6360000004 HC RX CONTRAST MEDICATION: Performed by: PHYSICIAN ASSISTANT

## 2024-06-09 PROCEDURE — 70496 CT ANGIOGRAPHY HEAD: CPT

## 2024-06-09 PROCEDURE — 93005 ELECTROCARDIOGRAM TRACING: CPT | Performed by: EMERGENCY MEDICINE

## 2024-06-09 PROCEDURE — 99285 EMERGENCY DEPT VISIT HI MDM: CPT

## 2024-06-09 PROCEDURE — 85025 COMPLETE CBC W/AUTO DIFF WBC: CPT

## 2024-06-09 PROCEDURE — 84484 ASSAY OF TROPONIN QUANT: CPT

## 2024-06-09 PROCEDURE — 81001 URINALYSIS AUTO W/SCOPE: CPT

## 2024-06-09 PROCEDURE — 80053 COMPREHEN METABOLIC PANEL: CPT

## 2024-06-09 RX ORDER — 0.9 % SODIUM CHLORIDE 0.9 %
1000 INTRAVENOUS SOLUTION INTRAVENOUS ONCE
Status: COMPLETED | OUTPATIENT
Start: 2024-06-09 | End: 2024-06-09

## 2024-06-09 RX ADMIN — IOPAMIDOL 75 ML: 755 INJECTION, SOLUTION INTRAVENOUS at 13:45

## 2024-06-09 RX ADMIN — SODIUM CHLORIDE 1000 ML: 9 INJECTION, SOLUTION INTRAVENOUS at 11:54

## 2024-06-09 NOTE — ED PROVIDER NOTES
edit the dictations but occasionally words are mis-transcribed.)    MARITZA Soto (electronically signed)            Jigar Harp PA  06/09/24 1517

## 2024-06-09 NOTE — DISCHARGE INSTRUCTIONS
Follow up with your primary care provider in 3 days for a recheck      Return to the ED if you have any worsening symptoms.

## 2024-06-11 LAB
EKG ATRIAL RATE: 60 BPM
EKG DIAGNOSIS: NORMAL
EKG P-R INTERVAL: 166 MS
EKG Q-T INTERVAL: 438 MS
EKG QRS DURATION: 136 MS
EKG QTC CALCULATION (BAZETT): 438 MS
EKG R AXIS: -38 DEGREES
EKG T AXIS: 8 DEGREES
EKG VENTRICULAR RATE: 60 BPM

## 2024-06-11 PROCEDURE — 93010 ELECTROCARDIOGRAM REPORT: CPT | Performed by: INTERNAL MEDICINE

## 2024-06-17 ENCOUNTER — TELEPHONE (OUTPATIENT)
Dept: CARDIOLOGY CLINIC | Age: 84
End: 2024-06-17

## 2024-06-17 NOTE — TELEPHONE ENCOUNTER
Pt called stating that they passed out at the [facility] and was sent to [facility], pt was told to get appt with LES. Pt is scheduled 7/9 and wanting to know if they needed to get seen sooner thank the 7/9 appt they did not find anything wrong.     Pls advise

## 2024-07-20 NOTE — PROGRESS NOTES
[facility]   Cardiac Follow Up     Referring Provider:  Joe Gerber MD     Chief Complaint   Patient presents with    Follow-up     Patient complains of dizziness and BP running low recently.     Hypertension    Hyperlipidemia    Congestive Heart Failure    Coronary Artery Disease        History of Present Illness:  Mitchel Mkcinney is a 84 y.o. male with a history of CAD/ s/p CABG 02/ hypertension/ and hyperlipidemia AAA s/p repair 2009 and carotid stenosis s/p LCEA 2006.    ED 11/22/2020 with complaint of a syncopal episode.    He had carpal tunnel surgery to his right hand the week prior.  Patient states he woke up in the morning he went downstairs to make coffee.  States he  made coffee was going to sit down in his chair when apparently he had a syncopal episode.  Patient states he woke up on the ground, the chair was broken and he had a contusion to the back of his head. He just felt dizzy and then next found himself on the floor.  He did not have any sweating or hot flashes. He was discharged home on 11/24/2020 with a 14 day holter monitor.      Pt was admitted to hospital July 2022 for ACS.  LHC with grafts per Dr Soares on July 13, 2022.PCI of RCA    1/13/24 Demetri was seen in ER for chest pain >  underwent LHC which did not reveal any significant ischemic culprits.     6/9/24 presented emergency room due to syncopal event that occurred earlier that day while at [employer]. States that he was sitting in a chair and fell to the side. He was staring off into space and was not responsive to the people around him evaluated at the time. He had a reassuring neuroexam with any focal neurological deficits. Troponin x 2 was normal. EKG read as normal sinus rhythm chest x-ray did not show any acute cardiopulmonary normalities.     He presented to [facility] on 7/24/2024 flulike symptoms.  He was found to have COVID-19.  He had an unsteady gait and was dizzy with presyncope, so he is admitted for

## 2024-07-24 ENCOUNTER — HOSPITAL ENCOUNTER (INPATIENT)
Age: 84
LOS: 1 days | Discharge: HOME | DRG: 312 | End: 2024-07-27
Attending: EMERGENCY MEDICINE | Admitting: INTERNAL MEDICINE
Payer: COMMERCIAL

## 2024-07-24 ENCOUNTER — APPOINTMENT (OUTPATIENT)
Dept: CT IMAGING | Age: 84
DRG: 312 | End: 2024-07-24
Payer: COMMERCIAL

## 2024-07-24 ENCOUNTER — APPOINTMENT (OUTPATIENT)
Dept: GENERAL RADIOLOGY | Age: 84
DRG: 312 | End: 2024-07-24
Payer: COMMERCIAL

## 2024-07-24 DIAGNOSIS — I65.23 BILATERAL CAROTID ARTERY STENOSIS: ICD-10-CM

## 2024-07-24 DIAGNOSIS — I21.4 NSTEMI (NON-ST ELEVATED MYOCARDIAL INFARCTION) (HCC): Primary | ICD-10-CM

## 2024-07-24 DIAGNOSIS — U07.1 COVID: ICD-10-CM

## 2024-07-24 DIAGNOSIS — Z95.828 HISTORY OF REPAIR OF ANEURYSM OF ABDOMINAL AORTA USING ENDOVASCULAR STENT GRAFT: ICD-10-CM

## 2024-07-24 DIAGNOSIS — R26.81 GAIT INSTABILITY: ICD-10-CM

## 2024-07-24 DIAGNOSIS — R53.1 GENERAL WEAKNESS: ICD-10-CM

## 2024-07-24 LAB
ALBUMIN SERPL-MCNC: 4 G/DL (ref 3.4–5)
ALBUMIN/GLOB SERPL: 1.1 {RATIO} (ref 1.1–2.2)
ALP SERPL-CCNC: 132 U/L (ref 40–129)
ALT SERPL-CCNC: 13 U/L (ref 10–40)
ANION GAP SERPL CALCULATED.3IONS-SCNC: 14 MMOL/L (ref 3–16)
AST SERPL-CCNC: 23 U/L (ref 15–37)
BACTERIA URNS QL MICRO: NORMAL /HPF
BASE EXCESS BLDV CALC-SCNC: -0.3 MMOL/L (ref -3–3)
BASOPHILS # BLD: 0 K/UL (ref 0–0.2)
BASOPHILS NFR BLD: 0.6 %
BILIRUB SERPL-MCNC: 1 MG/DL (ref 0–1)
BILIRUB UR QL STRIP.AUTO: NEGATIVE
BUN SERPL-MCNC: 17 MG/DL (ref 7–20)
CALCIUM SERPL-MCNC: 9.2 MG/DL (ref 8.3–10.6)
CHLORIDE SERPL-SCNC: 100 MMOL/L (ref 99–110)
CLARITY UR: CLEAR
CO2 BLDV-SCNC: 54 MMOL/L
CO2 SERPL-SCNC: 18 MMOL/L (ref 21–32)
COHGB MFR BLDV: 3.9 % (ref 0–1.5)
COLOR UR: YELLOW
CREAT SERPL-MCNC: 1.2 MG/DL (ref 0.8–1.3)
DEPRECATED RDW RBC AUTO: 14.3 % (ref 12.4–15.4)
DO-HGB MFR BLDV: 4 %
EOSINOPHIL # BLD: 0 K/UL (ref 0–0.6)
EOSINOPHIL NFR BLD: 0.1 %
EPI CELLS #/AREA URNS AUTO: 0 /HPF (ref 0–5)
FLUAV RNA RESP QL NAA+PROBE: NOT DETECTED
FLUBV RNA RESP QL NAA+PROBE: NOT DETECTED
GFR SERPLBLD CREATININE-BSD FMLA CKD-EPI: 59 ML/MIN/{1.73_M2}
GLUCOSE BLD-MCNC: 110 MG/DL (ref 70–99)
GLUCOSE SERPL-MCNC: 127 MG/DL (ref 70–99)
GLUCOSE UR STRIP.AUTO-MCNC: NEGATIVE MG/DL
HCO3 BLDV-SCNC: 23 MMOL/L (ref 23–29)
HCT VFR BLD AUTO: 43.7 % (ref 40.5–52.5)
HGB BLD-MCNC: 15.1 G/DL (ref 13.5–17.5)
HGB UR QL STRIP.AUTO: NEGATIVE
HYALINE CASTS #/AREA URNS AUTO: 0 /LPF (ref 0–8)
INR PPP: 1.05 (ref 0.85–1.15)
KETONES UR STRIP.AUTO-MCNC: NEGATIVE MG/DL
LACTATE BLDV-SCNC: 1.7 MMOL/L (ref 0.4–1.9)
LEUKOCYTE ESTERASE UR QL STRIP.AUTO: NEGATIVE
LYMPHOCYTES # BLD: 0.8 K/UL (ref 1–5.1)
LYMPHOCYTES NFR BLD: 14.3 %
MCH RBC QN AUTO: 32.3 PG (ref 26–34)
MCHC RBC AUTO-ENTMCNC: 34.5 G/DL (ref 31–36)
MCV RBC AUTO: 93.8 FL (ref 80–100)
METHGB MFR BLDV: 0.1 %
MONOCYTES # BLD: 0.6 K/UL (ref 0–1.3)
MONOCYTES NFR BLD: 10.2 %
NEUTROPHILS # BLD: 4.2 K/UL (ref 1.7–7.7)
NEUTROPHILS NFR BLD: 74.8 %
NITRITE UR QL STRIP.AUTO: NEGATIVE
O2 CT VFR BLDV CALC: 21 VOL %
O2 THERAPY: ABNORMAL
PCO2 BLDV: 33.6 MMHG (ref 40–50)
PERFORMED ON: ABNORMAL
PH BLDV: 7.44 [PH] (ref 7.35–7.45)
PH UR STRIP.AUTO: 5.5 [PH] (ref 5–8)
PLATELET # BLD AUTO: 112 K/UL (ref 135–450)
PMV BLD AUTO: 9.7 FL (ref 5–10.5)
PO2 BLDV: 76.8 MMHG (ref 25–40)
POTASSIUM SERPL-SCNC: 4.7 MMOL/L (ref 3.5–5.1)
PROT SERPL-MCNC: 7.5 G/DL (ref 6.4–8.2)
PROT UR STRIP.AUTO-MCNC: ABNORMAL MG/DL
PROTHROMBIN TIME: 13.9 SEC (ref 11.9–14.9)
RBC # BLD AUTO: 4.66 M/UL (ref 4.2–5.9)
RBC CLUMPS #/AREA URNS AUTO: 2 /HPF (ref 0–4)
REASON FOR REJECTION: NORMAL
REJECTED TEST: NORMAL
SAO2 % BLDV: 96 %
SARS-COV-2 RNA RESP QL NAA+PROBE: DETECTED
SODIUM SERPL-SCNC: 132 MMOL/L (ref 136–145)
SP GR UR STRIP.AUTO: 1.02 (ref 1–1.03)
TROPONIN, HIGH SENSITIVITY: 30 NG/L (ref 0–22)
TROPONIN, HIGH SENSITIVITY: 35 NG/L (ref 0–22)
UA COMPLETE W REFLEX CULTURE PNL UR: ABNORMAL
UA DIPSTICK W REFLEX MICRO PNL UR: YES
URN SPEC COLLECT METH UR: ABNORMAL
UROBILINOGEN UR STRIP-ACNC: 1 E.U./DL
WBC # BLD AUTO: 5.6 K/UL (ref 4–11)
WBC #/AREA URNS AUTO: 0 /HPF (ref 0–5)

## 2024-07-24 PROCEDURE — G0378 HOSPITAL OBSERVATION PER HR: HCPCS

## 2024-07-24 PROCEDURE — 82803 BLOOD GASES ANY COMBINATION: CPT

## 2024-07-24 PROCEDURE — 81001 URINALYSIS AUTO W/SCOPE: CPT

## 2024-07-24 PROCEDURE — 93005 ELECTROCARDIOGRAM TRACING: CPT | Performed by: EMERGENCY MEDICINE

## 2024-07-24 PROCEDURE — 2580000003 HC RX 258: Performed by: PHYSICIAN ASSISTANT

## 2024-07-24 PROCEDURE — 80053 COMPREHEN METABOLIC PANEL: CPT

## 2024-07-24 PROCEDURE — 87040 BLOOD CULTURE FOR BACTERIA: CPT

## 2024-07-24 PROCEDURE — 83605 ASSAY OF LACTIC ACID: CPT

## 2024-07-24 PROCEDURE — 6370000000 HC RX 637 (ALT 250 FOR IP): Performed by: EMERGENCY MEDICINE

## 2024-07-24 PROCEDURE — 6360000004 HC RX CONTRAST MEDICATION: Performed by: EMERGENCY MEDICINE

## 2024-07-24 PROCEDURE — 96361 HYDRATE IV INFUSION ADD-ON: CPT

## 2024-07-24 PROCEDURE — 87636 SARSCOV2 & INF A&B AMP PRB: CPT

## 2024-07-24 PROCEDURE — 84484 ASSAY OF TROPONIN QUANT: CPT

## 2024-07-24 PROCEDURE — 99285 EMERGENCY DEPT VISIT HI MDM: CPT

## 2024-07-24 PROCEDURE — 85025 COMPLETE CBC W/AUTO DIFF WBC: CPT

## 2024-07-24 PROCEDURE — 2580000003 HC RX 258: Performed by: EMERGENCY MEDICINE

## 2024-07-24 PROCEDURE — 6370000000 HC RX 637 (ALT 250 FOR IP): Performed by: PHYSICIAN ASSISTANT

## 2024-07-24 PROCEDURE — 85610 PROTHROMBIN TIME: CPT

## 2024-07-24 PROCEDURE — 36415 COLL VENOUS BLD VENIPUNCTURE: CPT

## 2024-07-24 PROCEDURE — 96360 HYDRATION IV INFUSION INIT: CPT

## 2024-07-24 PROCEDURE — 70450 CT HEAD/BRAIN W/O DYE: CPT

## 2024-07-24 PROCEDURE — 70496 CT ANGIOGRAPHY HEAD: CPT

## 2024-07-24 PROCEDURE — 71045 X-RAY EXAM CHEST 1 VIEW: CPT

## 2024-07-24 RX ORDER — LISINOPRIL 20 MG/1
20 TABLET ORAL 2 TIMES DAILY
Status: DISCONTINUED | OUTPATIENT
Start: 2024-07-24 | End: 2024-07-25

## 2024-07-24 RX ORDER — SODIUM CHLORIDE 9 MG/ML
INJECTION, SOLUTION INTRAVENOUS PRN
Status: DISCONTINUED | OUTPATIENT
Start: 2024-07-24 | End: 2024-07-27 | Stop reason: HOSPADM

## 2024-07-24 RX ORDER — ASPIRIN 81 MG/1
324 TABLET, CHEWABLE ORAL ONCE
Status: COMPLETED | OUTPATIENT
Start: 2024-07-24 | End: 2024-07-24

## 2024-07-24 RX ORDER — TAMSULOSIN HYDROCHLORIDE 0.4 MG/1
0.4 CAPSULE ORAL DAILY
Status: DISCONTINUED | OUTPATIENT
Start: 2024-07-24 | End: 2024-07-27 | Stop reason: HOSPADM

## 2024-07-24 RX ORDER — ENOXAPARIN SODIUM 100 MG/ML
30 INJECTION SUBCUTANEOUS 2 TIMES DAILY
Status: DISCONTINUED | OUTPATIENT
Start: 2024-07-25 | End: 2024-07-27 | Stop reason: HOSPADM

## 2024-07-24 RX ORDER — INSULIN LISPRO 100 [IU]/ML
0-4 INJECTION, SOLUTION INTRAVENOUS; SUBCUTANEOUS
Status: DISCONTINUED | OUTPATIENT
Start: 2024-07-25 | End: 2024-07-27 | Stop reason: HOSPADM

## 2024-07-24 RX ORDER — ONDANSETRON 2 MG/ML
4 INJECTION INTRAMUSCULAR; INTRAVENOUS EVERY 6 HOURS PRN
Status: DISCONTINUED | OUTPATIENT
Start: 2024-07-24 | End: 2024-07-27 | Stop reason: HOSPADM

## 2024-07-24 RX ORDER — SODIUM CHLORIDE 0.9 % (FLUSH) 0.9 %
10 SYRINGE (ML) INJECTION PRN
Status: DISCONTINUED | OUTPATIENT
Start: 2024-07-24 | End: 2024-07-27 | Stop reason: HOSPADM

## 2024-07-24 RX ORDER — ACETAMINOPHEN 325 MG/1
650 TABLET ORAL ONCE
Status: COMPLETED | OUTPATIENT
Start: 2024-07-24 | End: 2024-07-24

## 2024-07-24 RX ORDER — ACETAMINOPHEN 325 MG/1
650 TABLET ORAL EVERY 4 HOURS PRN
Status: DISCONTINUED | OUTPATIENT
Start: 2024-07-24 | End: 2024-07-27 | Stop reason: HOSPADM

## 2024-07-24 RX ORDER — ACETAMINOPHEN 650 MG/1
650 SUPPOSITORY RECTAL EVERY 4 HOURS PRN
Status: DISCONTINUED | OUTPATIENT
Start: 2024-07-24 | End: 2024-07-27 | Stop reason: HOSPADM

## 2024-07-24 RX ORDER — CARVEDILOL 6.25 MG/1
6.25 TABLET ORAL 2 TIMES DAILY WITH MEALS
Status: DISCONTINUED | OUTPATIENT
Start: 2024-07-24 | End: 2024-07-25

## 2024-07-24 RX ORDER — INSULIN LISPRO 100 [IU]/ML
0-4 INJECTION, SOLUTION INTRAVENOUS; SUBCUTANEOUS NIGHTLY
Status: DISCONTINUED | OUTPATIENT
Start: 2024-07-24 | End: 2024-07-27 | Stop reason: HOSPADM

## 2024-07-24 RX ORDER — SODIUM CHLORIDE, SODIUM LACTATE, POTASSIUM CHLORIDE, AND CALCIUM CHLORIDE .6; .31; .03; .02 G/100ML; G/100ML; G/100ML; G/100ML
1000 INJECTION, SOLUTION INTRAVENOUS ONCE
Status: COMPLETED | OUTPATIENT
Start: 2024-07-24 | End: 2024-07-24

## 2024-07-24 RX ORDER — SODIUM CHLORIDE 0.9 % (FLUSH) 0.9 %
10 SYRINGE (ML) INJECTION EVERY 12 HOURS SCHEDULED
Status: DISCONTINUED | OUTPATIENT
Start: 2024-07-24 | End: 2024-07-27 | Stop reason: HOSPADM

## 2024-07-24 RX ORDER — DEXTROSE MONOHYDRATE 100 MG/ML
INJECTION, SOLUTION INTRAVENOUS CONTINUOUS PRN
Status: DISCONTINUED | OUTPATIENT
Start: 2024-07-24 | End: 2024-07-27 | Stop reason: HOSPADM

## 2024-07-24 RX ORDER — CLOPIDOGREL BISULFATE 75 MG/1
75 TABLET ORAL DAILY
Status: DISCONTINUED | OUTPATIENT
Start: 2024-07-25 | End: 2024-07-27 | Stop reason: HOSPADM

## 2024-07-24 RX ORDER — SENNOSIDES A AND B 8.6 MG/1
1 TABLET, FILM COATED ORAL DAILY PRN
Status: DISCONTINUED | OUTPATIENT
Start: 2024-07-24 | End: 2024-07-27 | Stop reason: HOSPADM

## 2024-07-24 RX ORDER — ATORVASTATIN CALCIUM 40 MG/1
40 TABLET, FILM COATED ORAL NIGHTLY
Status: DISCONTINUED | OUTPATIENT
Start: 2024-07-24 | End: 2024-07-27 | Stop reason: HOSPADM

## 2024-07-24 RX ORDER — ISOSORBIDE MONONITRATE 60 MG/1
60 TABLET, EXTENDED RELEASE ORAL DAILY
Status: DISCONTINUED | OUTPATIENT
Start: 2024-07-25 | End: 2024-07-25

## 2024-07-24 RX ORDER — GLUCAGON 1 MG/ML
1 KIT INJECTION PRN
Status: DISCONTINUED | OUTPATIENT
Start: 2024-07-24 | End: 2024-07-27 | Stop reason: HOSPADM

## 2024-07-24 RX ORDER — ASPIRIN 81 MG/1
81 TABLET, CHEWABLE ORAL DAILY
Status: DISCONTINUED | OUTPATIENT
Start: 2024-07-25 | End: 2024-07-27 | Stop reason: HOSPADM

## 2024-07-24 RX ADMIN — IOPAMIDOL 75 ML: 755 INJECTION, SOLUTION INTRAVENOUS at 19:32

## 2024-07-24 RX ADMIN — TAMSULOSIN HYDROCHLORIDE 0.4 MG: 0.4 CAPSULE ORAL at 23:48

## 2024-07-24 RX ADMIN — SODIUM CHLORIDE, POTASSIUM CHLORIDE, SODIUM LACTATE AND CALCIUM CHLORIDE 1000 ML: 600; 310; 30; 20 INJECTION, SOLUTION INTRAVENOUS at 19:18

## 2024-07-24 RX ADMIN — ACETAMINOPHEN 650 MG: 325 TABLET ORAL at 18:26

## 2024-07-24 RX ADMIN — ATORVASTATIN CALCIUM 40 MG: 40 TABLET, FILM COATED ORAL at 23:48

## 2024-07-24 RX ADMIN — ASPIRIN 81 MG 324 MG: 81 TABLET ORAL at 21:53

## 2024-07-24 RX ADMIN — CARVEDILOL 6.25 MG: 6.25 TABLET, FILM COATED ORAL at 23:48

## 2024-07-24 RX ADMIN — SODIUM CHLORIDE, PRESERVATIVE FREE 10 ML: 5 INJECTION INTRAVENOUS at 23:48

## 2024-07-24 RX ADMIN — LISINOPRIL 20 MG: 20 TABLET ORAL at 23:48

## 2024-07-25 ENCOUNTER — APPOINTMENT (OUTPATIENT)
Dept: VASCULAR LAB | Age: 84
DRG: 312 | End: 2024-07-25
Payer: COMMERCIAL

## 2024-07-25 ENCOUNTER — APPOINTMENT (OUTPATIENT)
Dept: MRI IMAGING | Age: 84
DRG: 312 | End: 2024-07-25
Payer: COMMERCIAL

## 2024-07-25 LAB
ANION GAP SERPL CALCULATED.3IONS-SCNC: 12 MMOL/L (ref 3–16)
BUN SERPL-MCNC: 17 MG/DL (ref 7–20)
CALCIUM SERPL-MCNC: 8.6 MG/DL (ref 8.3–10.6)
CHLORIDE SERPL-SCNC: 103 MMOL/L (ref 99–110)
CHOLEST SERPL-MCNC: 137 MG/DL (ref 0–199)
CO2 SERPL-SCNC: 22 MMOL/L (ref 21–32)
CREAT SERPL-MCNC: 1 MG/DL (ref 0.8–1.3)
DEPRECATED RDW RBC AUTO: 14.5 % (ref 12.4–15.4)
ECHO BSA: 2.27 M2
ECHO BSA: 2.27 M2
EST. AVERAGE GLUCOSE BLD GHB EST-MCNC: 148.5 MG/DL
GFR SERPLBLD CREATININE-BSD FMLA CKD-EPI: 73 ML/MIN/{1.73_M2}
GLUCOSE BLD-MCNC: 101 MG/DL (ref 70–99)
GLUCOSE BLD-MCNC: 105 MG/DL (ref 70–99)
GLUCOSE BLD-MCNC: 112 MG/DL (ref 70–99)
GLUCOSE BLD-MCNC: 115 MG/DL (ref 70–99)
GLUCOSE SERPL-MCNC: 128 MG/DL (ref 70–99)
HBA1C MFR BLD: 6.8 %
HCT VFR BLD AUTO: 41.9 % (ref 40.5–52.5)
HDLC SERPL-MCNC: 35 MG/DL (ref 40–60)
HGB BLD-MCNC: 14.3 G/DL (ref 13.5–17.5)
LDLC SERPL CALC-MCNC: 87 MG/DL
Lab: 34.9 CM/S
Lab: 46.1 CM/S
Lab: 46.4 CM/S
Lab: 50.4 CM/S
Lab: NORMAL
Lab: NORMAL
MCH RBC QN AUTO: 32.4 PG (ref 26–34)
MCHC RBC AUTO-ENTMCNC: 34.3 G/DL (ref 31–36)
MCV RBC AUTO: 94.6 FL (ref 80–100)
PERFORMED ON: ABNORMAL
PLATELET # BLD AUTO: 100 K/UL (ref 135–450)
PMV BLD AUTO: 9.2 FL (ref 5–10.5)
POTASSIUM SERPL-SCNC: 4 MMOL/L (ref 3.5–5.1)
RBC # BLD AUTO: 4.42 M/UL (ref 4.2–5.9)
SODIUM SERPL-SCNC: 137 MMOL/L (ref 136–145)
TRIGL SERPL-MCNC: 73 MG/DL (ref 0–150)
VAS EVAR GRAFT BODY PSV: 37.4 CM/S
VAS EVAR LEFT LIMB DIST PSV: 29 CM/S
VAS EVAR LEFT LIMB MID PSV: 46.4 CM/S
VAS EVAR LEFT LIMB PROX PSV: 80.9 CM/S
VAS EVAR RIGHT LIMB DIST PSV: 50.4 CM/S
VAS EVAR RIGHT LIMB MID PSV: 33.5 CM/S
VAS EVAR RIGHT LIMB PROX PSV: 48.5 CM/S
VAS EVAR SUP ATTACH PSV: 44.1 CM/S
VAS EVAR SUPERIOR ATTACH SITE: NORMAL
VAS LEFT CCA DIST EDV: 13.3 CM/S
VAS LEFT CCA DIST PSV: 83.4 CM/S
VAS LEFT CCA MID EDV: 14.7 CM/S
VAS LEFT CCA MID PSV: 70.8 CM/S
VAS LEFT CCA PROX EDV: 7 CM/S
VAS LEFT CCA PROX PSV: 93.9 CM/S
VAS LEFT ECA EDV: 50.8 CM/S
VAS LEFT ECA PSV: 192 CM/S
VAS LEFT EXT ILIAC PSV: 81.7 CM/S
VAS LEFT ICA DIST EDV: 16.7 CM/S
VAS LEFT ICA DIST PSV: 54.9 CM/S
VAS LEFT ICA MID EDV: 21.7 CM/S
VAS LEFT ICA MID PSV: 68.7 CM/S
VAS LEFT ICA PROX EDV: 14 CM/S
VAS LEFT ICA PROX PSV: 84.8 CM/S
VAS LEFT ICA/CCA PSV: 1.02
VAS LEFT SUBCLAVIAN PROX PSV: 97.8 CM/S
VAS LEFT VERTEBRAL EDV: 12.7 CM/S
VAS LEFT VERTEBRAL PSV: 38.2 CM/S
VAS RIGHT CCA DIST EDV: 15.9 CM/S
VAS RIGHT CCA DIST PSV: 84 CM/S
VAS RIGHT CCA MID EDV: 14.8 CM/S
VAS RIGHT CCA MID PSV: 77.9 CM/S
VAS RIGHT CCA PROX EDV: 15.4 CM/S
VAS RIGHT CCA PROX PSV: 70.2 CM/S
VAS RIGHT ECA PSV: 109 CM/S
VAS RIGHT EXT ILIAC PSV: 100 CM/S
VAS RIGHT ICA DIST EDV: 13 CM/S
VAS RIGHT ICA DIST PSV: 49.7 CM/S
VAS RIGHT ICA MID EDV: 22 CM/S
VAS RIGHT ICA MID PSV: 69.1 CM/S
VAS RIGHT ICA PROX EDV: 34.5 CM/S
VAS RIGHT ICA PROX PSV: 97.2 CM/S
VAS RIGHT ICA/CCA PSV: 1.16
VAS RIGHT SUBCLAVIAN PROX PSV: 77.6 CM/S
VAS RIGHT VERTEBRAL EDV: 5.52 CM/S
VAS RIGHT VERTEBRAL PSV: 21.5 CM/S
VLDLC SERPL CALC-MCNC: 15 MG/DL
WBC # BLD AUTO: 4.5 K/UL (ref 4–11)

## 2024-07-25 PROCEDURE — 92526 ORAL FUNCTION THERAPY: CPT

## 2024-07-25 PROCEDURE — 97535 SELF CARE MNGMENT TRAINING: CPT

## 2024-07-25 PROCEDURE — 93880 EXTRACRANIAL BILAT STUDY: CPT

## 2024-07-25 PROCEDURE — G0378 HOSPITAL OBSERVATION PER HR: HCPCS

## 2024-07-25 PROCEDURE — 97161 PT EVAL LOW COMPLEX 20 MIN: CPT

## 2024-07-25 PROCEDURE — 6360000002 HC RX W HCPCS: Performed by: PHYSICIAN ASSISTANT

## 2024-07-25 PROCEDURE — 96361 HYDRATE IV INFUSION ADD-ON: CPT

## 2024-07-25 PROCEDURE — 80061 LIPID PANEL: CPT

## 2024-07-25 PROCEDURE — 80048 BASIC METABOLIC PNL TOTAL CA: CPT

## 2024-07-25 PROCEDURE — 2580000003 HC RX 258: Performed by: PHYSICIAN ASSISTANT

## 2024-07-25 PROCEDURE — 96372 THER/PROPH/DIAG INJ SC/IM: CPT

## 2024-07-25 PROCEDURE — 93978 VASCULAR STUDY: CPT | Performed by: INTERNAL MEDICINE

## 2024-07-25 PROCEDURE — 2580000003 HC RX 258: Performed by: SURGERY

## 2024-07-25 PROCEDURE — 83036 HEMOGLOBIN GLYCOSYLATED A1C: CPT

## 2024-07-25 PROCEDURE — 93978 VASCULAR STUDY: CPT

## 2024-07-25 PROCEDURE — 97165 OT EVAL LOW COMPLEX 30 MIN: CPT

## 2024-07-25 PROCEDURE — 36415 COLL VENOUS BLD VENIPUNCTURE: CPT

## 2024-07-25 PROCEDURE — APPNB30 APP NON BILLABLE TIME 0-30 MINS: Performed by: NURSE PRACTITIONER

## 2024-07-25 PROCEDURE — 92610 EVALUATE SWALLOWING FUNCTION: CPT

## 2024-07-25 PROCEDURE — APPSS60 APP SPLIT SHARED TIME 46-60 MINUTES: Performed by: NURSE PRACTITIONER

## 2024-07-25 PROCEDURE — 70551 MRI BRAIN STEM W/O DYE: CPT

## 2024-07-25 PROCEDURE — 85027 COMPLETE CBC AUTOMATED: CPT

## 2024-07-25 PROCEDURE — 93880 EXTRACRANIAL BILAT STUDY: CPT | Performed by: INTERNAL MEDICINE

## 2024-07-25 PROCEDURE — 6370000000 HC RX 637 (ALT 250 FOR IP): Performed by: PHYSICIAN ASSISTANT

## 2024-07-25 RX ORDER — 0.9 % SODIUM CHLORIDE 0.9 %
1000 INTRAVENOUS SOLUTION INTRAVENOUS ONCE
Status: COMPLETED | OUTPATIENT
Start: 2024-07-25 | End: 2024-07-25

## 2024-07-25 RX ORDER — CLOPIDOGREL BISULFATE 75 MG/1
75 TABLET ORAL DAILY
Qty: 30 TABLET | Refills: 3 | Status: SHIPPED | OUTPATIENT
Start: 2024-07-26

## 2024-07-25 RX ADMIN — SODIUM CHLORIDE 1000 ML: 9 INJECTION, SOLUTION INTRAVENOUS at 13:35

## 2024-07-25 RX ADMIN — LISINOPRIL 20 MG: 20 TABLET ORAL at 08:11

## 2024-07-25 RX ADMIN — CARVEDILOL 6.25 MG: 6.25 TABLET, FILM COATED ORAL at 08:11

## 2024-07-25 RX ADMIN — ENOXAPARIN SODIUM 30 MG: 100 INJECTION SUBCUTANEOUS at 20:57

## 2024-07-25 RX ADMIN — ENOXAPARIN SODIUM 30 MG: 100 INJECTION SUBCUTANEOUS at 08:12

## 2024-07-25 RX ADMIN — ATORVASTATIN CALCIUM 40 MG: 40 TABLET, FILM COATED ORAL at 20:57

## 2024-07-25 RX ADMIN — ISOSORBIDE MONONITRATE 60 MG: 60 TABLET, EXTENDED RELEASE ORAL at 08:12

## 2024-07-25 RX ADMIN — SODIUM CHLORIDE, PRESERVATIVE FREE 10 ML: 5 INJECTION INTRAVENOUS at 08:13

## 2024-07-25 RX ADMIN — SODIUM CHLORIDE, PRESERVATIVE FREE 10 ML: 5 INJECTION INTRAVENOUS at 20:57

## 2024-07-25 RX ADMIN — CLOPIDOGREL BISULFATE 75 MG: 75 TABLET ORAL at 08:12

## 2024-07-25 RX ADMIN — ASPIRIN 81 MG 81 MG: 81 TABLET ORAL at 08:11

## 2024-07-25 RX ADMIN — TAMSULOSIN HYDROCHLORIDE 0.4 MG: 0.4 CAPSULE ORAL at 08:12

## 2024-07-25 NOTE — PROGRESS NOTES
[facility] - Inpatient Rehabilitation Department   Phone: [phone]    Occupational Therapy    [x] Initial Evaluation            [] Daily Treatment Note         [] Discharge Summary      Patient: Mitchel Mckinney   : 1940   MRN: [id number]   Date of Service:  2024    Admitting Diagnosis:  Unsteady gait  Current Admission Summary: Per EMR: The pt presented with dizziness, cough and fever. Found to have COVID-19. MRI of brain showed no acute infarct.   Past Medical History:  has a past medical history of Acquired spondylolisthesis, Arthritis, CAD (coronary artery disease), Carotid disease, bilateral (HCC), Cholelithiasis, Chronic systolic heart failure (HCC), Diabetes mellitus type II, Essential hypertension, Hyperlipidemia, Obesity (BMI 30.0-34.9), Prolonged emergence from general anesthesia, Spinal stenosis of lumbar region, and Status post AAA (abdominal aortic aneurysm) repair.  Past Surgical History:  has a past surgical history that includes Umbilical hernia repair (); Hip Arthroplasty (Left, ); Carotid endarterectomy (Left, ); AAA repair, endovascular (); Coronary artery bypass graft (); Vasectomy (); cyst removal (Right, ); Thyroid surgery (); Retinal detachment surgery (Left, ); Coronary angioplasty with stent (); Rotator cuff repair (Left, 14); Inguinal hernia repair (Left, ); Elbow surgery (Left, ); Total knee arthroplasty (Right, 09/15/2015); other surgical history (2018); Revision total hip arthroplasty (Left, 2019); Carpal tunnel release (Right, 2020); and Carpal tunnel release (Left, 2020).    Discharge Recommendations: Mitchel Mckinney scored a / on the AM-PAC ADL Inpatient form.  At this time, no further OT is recommended upon discharge due to proximity to [facility]  Recommend patient returns to prior setting with prior services.        DME Required For Discharge: no DME required at  gown change completed in stance.   Lower Extremity Dressing: stand by assistance requires verbal cueing : pt initiated LB clothing in stance (threading breifs per his typical method at home); encouraged seated LB dressing for safety. Pt completed SBA.   Toileting: supervision.    Instrumental Activities of Daily Living  No IADL completed on this date.    Functional Mobility  Bed Mobility:  Supine to Sit: modified independent  Scooting: modified independent  Comments:  Transfers:  Sit to stand transfer:stand by assistance  Stand to sit transfer: stand by assistance  Toilet transfer: stand by assistance  Toilet transfer equipment: standard toilet  Comments:  Functional Mobility  Functional Mobility Activity: 10' + 10' + 60'  Device Use: no device  Required Assistance: stand by assistance, x 1 minimal LOB but able to right self with SBA. Activity tolerance limited only by intermittent dizziness and soft BP. See activity tolerance below.   Balance:  Static Sitting Balance: good(+): independent with high level dynamic balance in unsupported position  Dynamic Sitting Balance: good(+): independent with high level dynamic balance in unsupported position  Static Standing Balance: fair (+): maintains balance at SBA/supervision without use of UE support  Dynamic Standing Balance: fair (+): maintains balance at SBA/supervision without use of UE support  Comments:    Other Therapeutic Interventions    Functional Outcomes  AM-PAC Inpatient Daily Activity Raw Score: 22                                    Cognition  WFL  Orientation:    alert and oriented x 4  Command Following:   WFL     Education  Barriers To Learning: none  Patient Education: patient educated on goals, OT role and benefits, plan of care, discharge recommendations  Learning Assessment:  patient verbalizes understanding, would benefit from continued reinforcement    Assessment  Activity Tolerance: Fair--see below.  Notable dizziness after toileting and LB dressing

## 2024-07-25 NOTE — PLAN OF CARE
Problem: Discharge Planning  Goal: Discharge to home or other facility with appropriate resources  Outcome: Progressing  Flowsheets (Taken 7/24/2024 8434)  Discharge to home or other facility with appropriate resources:   Identify barriers to discharge with patient and caregiver   Arrange for needed discharge resources and transportation as appropriate   Identify discharge learning needs (meds, wound care, etc)   Arrange for interpreters to assist at discharge as needed   Refer to discharge planning if patient needs post-hospital services based on physician order or complex needs related to functional status, cognitive ability or social support system     Problem: Safety - Adult  Goal: Free from fall injury  Outcome: Progressing     Problem: ABCDS Injury Assessment  Goal: Absence of physical injury  Outcome: Progressing

## 2024-07-25 NOTE — H&P
[facility] Medicine History & Physical        Name:  Mitchel Mckinney /Age/Sex: 1940  (84 y.o. male)   MRN & CSN:  [id number] & [id number] Encounter Date/Time: 2024 10:38 PM EDT   Location:  [facility] PCP: Joe Gerber MD         CHIEF COMPLAINT:   Chief Complaint   Patient presents with    Fatigue     Pt via EMS from home, c/o weakness, dizziness and headache and chills since last night         HISTORY OF PRESENT ILLNESS:      History from: patient  Limitations to history : None     Mitchel Mckinney is a 84 y.o. male who presented to ED for evaluation of dizziness, generalized weakness, headache, fever, and fatigue which has been ongoing for the past 2 to 3 days.  Patient reports that dizziness is present both with positional changes and when going from sitting to standing.  He reports he sometimes has a feeling like he may pass out but also sometimes has a feeling like the room is spinning.  Patient noted to have an unsteady gait with ambulation in ED, veering to the left.  Patient reports he also has a nonproductive cough.  He denies chest pain, shortness of breath, extremity edema, abdominal pain, nausea, vomiting, diarrhea, constipation, urinary symptoms.  He denies any recent fall or head trauma, neck pain or stiffness, changes in vision, difficulty swallowing, numbness/tingling extremities, focal weakness.      REVIEW OF SYSTEMS:   Pertinent positives as noted in the HPI. All other systems reviewed and negative.      PHYSICAL EXAM PERFORMED:  BP (!) 171/93   Pulse (!) 102   Temp (!) 101 °F (38.3 °C)   Resp 24   SpO2 95%     General appearance:  Awake, alert, no apparent distress  HEENT:  Normocephalic, atraumatic without obvious deformity. PERRL. EOM intact. Conjunctivae/corneas clear.  Neck:  Supple, with full range of motion. No JVD. Trachea midline.  Respiratory:  Clear to auscultation bilaterally without rales, wheezes, or rhonchi. Normal respiratory effort.   Cardiovascular:   06/09/2024 HISTORY: Stroke symptoms. FINDINGS: Image quality mildly degraded by motion artifact. BRAIN/VENTRICLES: No acute intracranial hemorrhage, mass effect, or midline shift.  No abnormal extra-axial fluid collection.  The gray-white differentiation is maintained without evidence of an acute infarct.  Stable moderate parenchymal volume loss with mild chronic small vessel ischemic changes.  Atherosclerotic calcification.  No hydrocephalus. ORBITS: The visualized portion of the orbits demonstrate no acute abnormality. SINUSES: Opacification of the frontal sinuses, left greater than right. Opacification of the bilateral ethmoid air cells, left greater than right. New small air-fluid level in the left maxillary sinus and frothy secretions in the left sphenoid sinus.  Mastoid air cells are clear. SOFT TISSUES/SKULL:  No acute abnormality of the visualized skull or soft tissues.     No acute intracranial abnormality.  Worsening paranasal sinus disease, left greater than right.     XR CHEST PORTABLE    Result Date: 7/24/2024  EXAMINATION: ONE XRAY VIEW OF THE CHEST 7/24/2024 6:22 pm COMPARISON: CXR dated 06/09/2024 HISTORY: ORDERING SYSTEM PROVIDED HISTORY: stroke symptoms TECHNOLOGIST PROVIDED HISTORY: Reason for exam:->stroke symptoms Reason for Exam: XR CHEST PORTABLEstroke symptoms FINDINGS: Medical devices: Sternotomy wires Mediastinum/Heart: The mediastinal contours are normal. Lungs: The lungs are clear. Pleura: No pleural effusion. No pneumothorax.     No radiographic evidence of acute cardiopulmonary pathology.       Total time spent caring for the patient today was 75 minutes. This includes time spent before the visit reviewing the chart, time spent during the visit, and time spent after the visit on documentation.    (Please note that portions of this note were completed with a voice recognition program.  Efforts were made to edit the dictations but occasionally words are mis-transcribed.)     Lori MELCHOR

## 2024-07-25 NOTE — PROGRESS NOTES
Right carotid stenosis  - CTA head/neck notable for moderate stenosis of the right ICA at its takeoff, approximately 60% diameter narrowing  - Vascular surgery consulted     History of CAD s/p CABG  - Continue home ASA, Plavix, BB, statin, Imdur     DM2  - Last HbA1c was 5.9  - Hold home PO meds   - Accuchecks, SSI  - Hypoglycemia protocol        HTN  - Continue home Coreg and lisinopril     History of carotid stenosis s/p L CEA 2006     History of AAA s/p repair 2009    Physical Exam Performed:      General: NAD  Eyes: EOMI  ENT: neck supple  Cardiovascular: Regular rate.  Respiratory: Clear to auscultation  Gastrointestinal: Soft, non tender  Genitourinary: no suprapubic tenderness  Musculoskeletal: No edema  Skin: warm, dry  Neuro: Alert.  Psych: Mood appropriate.     /75   Pulse 83   Temp 98.8 °F (37.1 °C) (Temporal)   Resp 16   Ht 1.829 m (6')   Wt 98 kg (216 lb 0.8 oz)   SpO2 91%   BMI 29.30 kg/m²     Diet: ADULT DIET; Regular; 4 carb choices (60 gm/meal)  DVT Prophylaxis: []PPx LMWH  []SQ Heparin  []IPC/SCDs  []Eliquis  []Xarelto  []Coumadin  []Other -      Code status: Full Code  PT/OT Eval Status:   []NOT yet ordered  []Ordered and Pending   []Seen with Recommendations for:  []Home independently  []Home w/ assist  []HHC  []SNF  []Acute Rehab    Anticipated Discharge Day/Date:  tomorrow    Anticipated Discharge Location: []Home  []HHC  []SNF  []Acute Rehab  []ECF  []LTAC  []Hospice  []Other -      Consults:      IP CONSULT TO NEUROLOGY  IP CONSULT TO VASCULAR SURGERY      This patient has a high likelihood of being discharged tomorrow and is appropriate for A1 Discharge Unit in AM pending clinical course overnight: []Yes  []No    ------------------------------------------------------------------------------------------------------------------------------------------------------------------------    MDM    Level 3  [x] High (any 2)    A. Problems (any 1)  [x] Acute/Chronic Illness/injury posing  threat to life or bodily function: persistent orthostasis posing risk for fall   [] Severe exacerbation of chronic illness:    ---------------------------------------------------------------------  B. Risk of Treatment (any 1)   [] Drugs/treatments that require intensive monitoring for toxicity include:    [] Change in code status:    [] Decision to escalate care:     Major surgery/procedure with associated risk factors:    ----------------------------------------------------------------------  C. Data (any 2)  [x] Discussed current management and [facility] planning options [facility]  [] Discussed management of the case with:    [] Telemetry personally reviewed and interpreted as documented above    [] Imaging personally reviewed and interpreted, includes:    [x] Data Review (any 3)  [] Collateral history obtained from:    [x] All available Consultant notes from yesterday/today were reviewed  [x] All current labs were reviewed and interpreted for clinical significance   [x] Appropriate follow-up labs were ordered    Medications:  Personally reviewed in detail in conjunction w/ labs as documented for evidence of drug toxicity.     Infusion Medications    sodium chloride      dextrose       Scheduled Medications    aspirin  81 mg Oral Daily    atorvastatin  40 mg Oral Nightly    clopidogrel  75 mg Oral Daily    tamsulosin  0.4 mg Oral Daily    sodium chloride flush  10 mL IntraVENous 2 times per day    enoxaparin  30 mg SubCUTAneous BID    insulin lispro  0-4 Units SubCUTAneous TID WC    insulin lispro  0-4 Units SubCUTAneous Nightly     PRN Meds: sodium chloride flush, sodium chloride, acetaminophen **OR** acetaminophen, dextrose bolus **OR** dextrose bolus, glucagon (rDNA), dextrose, senna, ondansetron     Labs:  Personally reviewed and interpreted for clinical significance.     Recent Labs     07/24/24  1828 07/25/24  0458   WBC 5.6 4.5   HGB 15.1 14.3   HCT 43.7 41.9   * 100*     Recent Labs

## 2024-07-25 NOTE — ED NOTES
How does patient ambulate?   [x]Low Fall Risk (ambulates by themselves without support)  []Stand by assist   []Contact Guard   []Front wheel walker  []Wheelchair   []Steady  []Bed bound  []History of Lower Extremity Amputation  []Unknown, did not assess in the emergency department   How does patient take pills?  [x]Whole with Water  []Crushed in applesauce  []Crushed in pudding  []Other  []Unknown no oral medications were given in the ED  Is patient alert?   []Alert  []Drowsy but responds to voice  []Doesn't respond to voice but responds to painful stimuli  []Unresponsive  Is patient oriented?   []To person  []To place  []To time  []To situation  [x]Confused  []Agitated  []Follows commands  If patient is disoriented or from a Skill Nursing Facility has family been notified of admission?   []Yes   [x]No  Patient belongings?   [x]Cell phone  []Wallet   []Dentures  [x]Clothing  Any specific patient or family belongings/needs/dynamics?   none  Miscellaneous comments/pending orders?  none     If there are any additional questions please reach out to the Emergency Department.

## 2024-07-25 NOTE — ED PROVIDER NOTES
[facility] EMERGENCY DEPARTMENT    CHIEF COMPLAINT  Fatigue (Pt via EMS from home, c/o weakness, dizziness and headache and chills since last night)       HISTORY OF PRESENT ILLNESS  Mitchel Mckinney is a 84 y.o. male with history of CAD, CHF, diabetes, hypertension, carotid atherosclerosis, AAA status post endovascular repair who presents to the ED with fatigue, generalized weakness, headache, fever/chills for the past 2 days.  States that he feels dizzy.  Dizziness is both present with positional changes and going from sitting to standing.  He sometimes has a feeling of faintness but also sometimes has a feeling like things are moving or spinning.  Denies focal weakness, sensory deficit, speech loss, vision changes.  Denies chest pain or shortness of breath.  He is coughing.  Cough is nonproductive.    I have reviewed the following from the nursing documentation:    Past Medical History:   Diagnosis Date    Acquired spondylolisthesis 1/29/2014    Arthritis     diffuse    CAD (coronary artery disease)     S/P PCI and CABG    Carotid disease, bilateral (HCC) 2/3/2014    Cholelithiasis 11/10/2014    Chronic systolic heart failure (HCC)     Diabetes mellitus type II     Essential hypertension     Hyperlipidemia     Obesity (BMI 30.0-34.9) 2/27/2015    Prolonged emergence from general anesthesia     slow to wake up    Spinal stenosis of lumbar region 1/29/2014    Status post AAA (abdominal aortic aneurysm) repair 11/10/2014     Past Surgical History:   Procedure Laterality Date    ABDOMINAL AORTIC ANEURYSM REPAIR, ENDOVASCULAR  2009    Bolat; stent    CAROTID ENDARTERECTOMY Left 2006    Priscila    CARPAL TUNNEL RELEASE Right 11/20/2020    CARPAL TUNNEL RELEASE Left 12/18/2020    CORONARY ANGIOPLASTY WITH STENT PLACEMENT  2009    CORONARY ARTERY BYPASS GRAFT  2002    Gage, x4    CYST REMOVAL Right 1997    wrist    ELBOW SURGERY Left 1984    laceration to tendons    HIP ARTHROPLASTY Left 1999    Bell     is currently rate controlled and is intermittently going back to sinus rhythm for several minutes.  EKG shows some nonspecific changes in his T waves.  He is not complaining of active chest pain.  Initial troponin was 30, repeat is 35.  Given his increasing troponin, I have concern for NSTEMI, possible demand ischemia in the context of his tachycardia and fever earlier.  He is not complaining of active chest pain.  I have some apprehension about giving heparin within 24 to 48 hours of his last known well time with concern for possible posterior stroke on my exam.     On exam, he has some subtle past-pointing on finger-nose on the left and seems to veer off to the left with ambulation assessment.  I have concern for possible posterior infarct.  Last known well time was greater than 24 hours.  He is not a tenecteplase candidate.  I have ordered aspirin.  CT head was unremarkable other than sinus disease, he has moderate stenosis of right ICA and moderate stenosis of origins of bilateral vertebral arteries.  There is no evidence of acute LVO, dissection or aneurysm.         - Consults:   None       I, Africa Hudson MD, am the primary clinician of record.     During the patient's ED course, the patient was given:  Medications   aspirin chewable tablet 324 mg (has no administration in time range)   acetaminophen (TYLENOL) tablet 650 mg (650 mg Oral Given 7/24/24 1826)   lactated ringers bolus 1,000 mL (1,000 mLs IntraVENous New Bag 7/24/24 1918)   iopamidol (ISOVUE-370) 76 % injection 75 mL (75 mLs IntraVENous Given 7/24/24 1932)          All questions were answered and the patient/family expressed understanding and agreement with the plan.     PROCEDURES  None    CRITICAL CARE  N/A    CLINICAL IMPRESSION  1. NSTEMI (non-ST elevated myocardial infarction) (HCC)    2. Gait instability    3. COVID    4. General weakness        DISPOSITION  Decision To Admit 07/24/2024 09:11:51 PM     Africa Hudson MD    Note:

## 2024-07-25 NOTE — PLAN OF CARE
Problem: Discharge Planning  Goal: Discharge to home or other facility with appropriate resources  7/25/2024 0844 by Karey Patterson, RN  Outcome: Progressing     Problem: Safety - Adult  Goal: Free from fall injury  7/25/2024 0844 by Karey Patterson, RN  Outcome: Progressing     Problem: ABCDS Injury Assessment  Goal: Absence of physical injury  7/25/2024 0844 by Karey Patterson, RN  Outcome: Progressing

## 2024-07-25 NOTE — PROGRESS NOTES
[facility] - Inpatient Rehabilitation Department   Phone: [phone]    Physical Therapy    [x] Initial Evaluation            [] Daily Treatment Note         [] Discharge Summary      Patient: Mitchel Mckinney   : 1940   MRN: [id number]   Date of Service:  2024  Admitting Diagnosis: Unsteady gait  Current Admission Summary: The pt presented with dizziness, cough and fever.  Found to have COVID-19.  MRI of brain showed no acute infarct.   Past Medical History:  has a past medical history of Acquired spondylolisthesis, Arthritis, CAD (coronary artery disease), Carotid disease, bilateral (HCC), Cholelithiasis, Chronic systolic heart failure (HCC), Diabetes mellitus type II, Essential hypertension, Hyperlipidemia, Obesity (BMI 30.0-34.9), Prolonged emergence from general anesthesia, Spinal stenosis of lumbar region, and Status post AAA (abdominal aortic aneurysm) repair.  Past Surgical History:  has a past surgical history that includes Umbilical hernia repair (); Hip Arthroplasty (Left, ); Carotid endarterectomy (Left, ); AAA repair, endovascular (); Coronary artery bypass graft (); Vasectomy (); cyst removal (Right, ); Thyroid surgery (); Retinal detachment surgery (Left, ); Coronary angioplasty with stent (); Rotator cuff repair (Left, 14); Inguinal hernia repair (Left, ); Elbow surgery (Left, ); Total knee arthroplasty (Right, 09/15/2015); other surgical history (2018); Revision total hip arthroplasty (Left, 2019); Carpal tunnel release (Right, 2020); and Carpal tunnel release (Left, 2020).  Discharge Recommendations: Mitchel Mckinney scored a  on the AM-PAC short mobility form.  At this time, no further PT is recommended upon discharge as the pt is expected to regain his independence once hypotension resolves.  Recommend patient returns to prior setting with prior services.      DME Required For Discharge: no

## 2024-07-25 NOTE — CARE COORDINATION
Case Management Note:    Patient admitted as Observation with an anticipated short hospitalization length of stay. Chart reviewed and it appears that patient has minimal needs for discharge at this time. Medical staff to inform case management team if discharge needs are identified.        PT/OT evals still pending. Will await evals prior to discussing discharge plans with pt. Will follow up once therapy evals are complete and recommendations are rendered.       Case management will continue to follow progress and update discharge plan as needed.         Electronically signed by GINA Judge on 7/25/2024 at 8:14 AM

## 2024-07-25 NOTE — PROGRESS NOTES
Facility/Department: [address]  Speech Language Pathology  DYSPHAGIA BEDSIDE SWALLOW EVALUATION     Patient: Mitchel Mckinney   : 1940   MRN: [id number]      Evaluation Date: 2024   Admitting Diagnosis: Unsteady gait [R26.81]  Gait instability [R26.81]  General weakness [R53.1]  NSTEMI (non-ST elevated myocardial infarction) (HCC) [I21.4]  COVID [U07.1]  Pain: Denies                                                       H&P: Mitchel Mckinney is a 84 y.o. male who presented to ED for evaluation of dizziness, generalized weakness, headache, fever, and fatigue which has been ongoing for the past 2 to 3 days.  Patient reports that dizziness is present both with positional changes and when going from sitting to standing.  He reports he sometimes has a feeling like he may pass out but also sometimes has a feeling like the room is spinning.  Patient noted to have an unsteady gait with ambulation in ED, veering to the left.  Patient reports he also has a nonproductive cough.  He denies chest pain, shortness of breath, extremity edema, abdominal pain, nausea, vomiting, diarrhea, constipation, urinary symptoms.  He denies any recent fall or head trauma, neck pain or stiffness, changes in vision, difficulty swallowing, numbness/tingling extremities, focal weakness.       Imaging:  Chest X-ray:   IMPRESSION:  No radiographic evidence of acute cardiopulmonary pathology.    MRI:  IMPRESSION:  1. No acute intracranial abnormality.  No acute infarct.  2. Mild global parenchymal volume loss with mild chronic microvascular  ischemic changes.  This is slightly progressed.  3. Scattered mucosal thickening of the paranasal sinuses with a small amount  of fluid in the left maxillary sinus.    History/Prior Level of Function:   Living Status: lives with their spouse  Prior Dysphagia History: No prior dysphagia history noted per chart review or per pt report  Reason for referral: SLP evaluation orders received due to  []Required use of drink     Oral Mechanism Exam:  [x]WFL []Mild   [] Moderate  []Severe  []To be assessed  Impaired:   []Left side      []Right side    []Labial ROM/Coordination    []Labial Symmetry   []Lingual ROM/Coordination   []Lingual Symmetry  []Gag  []Other:     Oral Phase: []WFL [x]Mild   [] Moderate  []Severe  []To be assessed   [x]Impaired/Prolonged Mastication:   []Oral Holding:   []Spillage Left:   []Spillage Right:  []Pocketing Left:   []Pocketing Right:   []Decreased Anterior to Posterior Transit:   []Suspected Premature Bolus Loss:   []Lingual/Palatal Residue:   []Other:     Pharyngeal Phase: []WFL [x]Mild   [] Moderate  []Severe  []To be assessed   [x]Delayed Swallow:   []Suspected Pharyngeal Pooling:   []Decreased Laryngeal Elevation:   []Absent Swallow:  []Wet Vocal Quality:   []Throat Clearing-Immediate:   []Throat Clearing-Delayed:   []Cough-Immediate:   []Cough-Delayed:  []Change in Vital Signs:  [x]Suspected Delayed Pharyngeal Clearing:  []Other:     Eating Assistance:  [x]Independent  []Setup or clean-up assistance   [] Supervision or touching assistance   [] Partial or moderate assistance   [] Substantial or maximal assistance  [] Dependent     EDUCATION:   Provided education regarding role of SLP, results of assessment, recommendations and general speech pathology plan of care.   [x] Pt verbalized understanding and agreement   [] Pt requires ongoing learning   [] No evidence of comprehension     If patient discharges prior to next visit, this note will serve as discharge.     Treatment time:  Timed Code Treatment Minutes: 0 min  Total Treatment time:25 min    Lalitha ALVARADO-SLP#[id number]

## 2024-07-25 NOTE — CONSULTS
[facility]  Consultation Note    Chief Complaint / Reason for Consultation  Carotid stenosis     History of Present Illness  Patient is a 84 y.o. male with past medical history of HTN, HLD, AAA s/p EVAR (2009), carotid stenosis s/p left CEA (2006), CAD s/p CABG, DM and CHF who presented to the ED with complaints of generalized fatigue.  Patient reports he has not had much energy recently and has been very fatigued along with some chills.  Patient is left handed.  He denies any changes in speech or extremity weakness or numbness.  No history of stroke or TIA.  Further workup in ED included CTA head and neck which reported 60% right ICA stenosis.  He is also positive for COVID-19 and was febrile on admission.  He does not follow regularly with a vascular surgeon for surveillance imaging, previous left CEA and EVAR was done by Dr. Francois.  His last carotid duplex was in January which reported less than 50% stenosis of bilateral ICAs.  Last CT abdomen was in 2016 which showed stable EVAR with no endoleak.  We have been consulted for further evaluation and recommendation.     Review of Systems  + generalized fatigue, + chills, + fever.  Denies chest pain, shortness of breath, nausea, vomiting, hematemesis, diarrhea, constipation, melena, hematochezia, wt changes, vision problems, blindness, hearing problems, facial droop, slurred speech, extremity weakness, extremity numbness, dysuria.    Past Medical History:   Diagnosis Date    Acquired spondylolisthesis 1/29/2014    Arthritis     diffuse    CAD (coronary artery disease)     S/P PCI and CABG    Carotid disease, bilateral (HCC) 2/3/2014    Cholelithiasis 11/10/2014    Chronic systolic heart failure (HCC)     Diabetes mellitus type II     Essential hypertension     Hyperlipidemia     Obesity (BMI 30.0-34.9) 2/27/2015    Prolonged emergence from general anesthesia     slow to wake up    Spinal stenosis of lumbar region 1/29/2014    Status post  noted.     Bilateral renal cysts.       Assessment:   Right ICA stenosis - CTA reporting 60%, MRI brain negative for acute infarct   COVID-19 infection  HTN  HLD  DM  CAD s/p CABG  AAA s/p EVAR (2009)  H/o left CEA (2006)    Plan:  Will get carotid duplex for further evaluation and to establish surveillance imaging going forward.  No need for any vascular intervention on the right ICA at this time  Will need to continue surveillance going forward and if develops worsening stenosis > 70% then would consider intervention at that time.   Continue ASA, Plavix and statin therapy.  Patient is overdue for surveillance imaging of EVAR.  Will get aortic ultrasound to evaluate and then will need to set up yearly surveillance imaging.   Will follow up after scans reviewed with timing for repeat surveillance imaging.       Plan discussed with Dr. Bueno.    Thank you for the consultation.     Patient educated on plan of care and disease process.  All questions answered.        Electronically signed by CAROL Dinh CNP on 7/25/2024 at 8:24 AM

## 2024-07-26 DIAGNOSIS — Z95.828 HISTORY OF ENDOVASCULAR STENT GRAFT FOR ABDOMINAL AORTIC ANEURYSM: ICD-10-CM

## 2024-07-26 DIAGNOSIS — I65.23 BILATERAL CAROTID ARTERY STENOSIS: Primary | ICD-10-CM

## 2024-07-26 PROBLEM — I65.21 CAROTID STENOSIS, ASYMPTOMATIC, RIGHT: Status: ACTIVE | Noted: 2024-07-26

## 2024-07-26 LAB
ANION GAP SERPL CALCULATED.3IONS-SCNC: 11 MMOL/L (ref 3–16)
BUN SERPL-MCNC: 23 MG/DL (ref 7–20)
CALCIUM SERPL-MCNC: 9.1 MG/DL (ref 8.3–10.6)
CHLORIDE SERPL-SCNC: 103 MMOL/L (ref 99–110)
CO2 SERPL-SCNC: 24 MMOL/L (ref 21–32)
CREAT SERPL-MCNC: 1.5 MG/DL (ref 0.8–1.3)
EKG ATRIAL RATE: 107 BPM
EKG DIAGNOSIS: NORMAL
EKG P-R INTERVAL: 208 MS
EKG Q-T INTERVAL: 362 MS
EKG QRS DURATION: 144 MS
EKG QTC CALCULATION (BAZETT): 474 MS
EKG R AXIS: -34 DEGREES
EKG T AXIS: 1 DEGREES
EKG VENTRICULAR RATE: 103 BPM
GFR SERPLBLD CREATININE-BSD FMLA CKD-EPI: 45 ML/MIN/{1.73_M2}
GLUCOSE BLD-MCNC: 107 MG/DL (ref 70–99)
GLUCOSE BLD-MCNC: 89 MG/DL (ref 70–99)
GLUCOSE BLD-MCNC: 90 MG/DL (ref 70–99)
GLUCOSE BLD-MCNC: 98 MG/DL (ref 70–99)
GLUCOSE SERPL-MCNC: 96 MG/DL (ref 70–99)
PERFORMED ON: ABNORMAL
PERFORMED ON: NORMAL
POTASSIUM SERPL-SCNC: 4.6 MMOL/L (ref 3.5–5.1)
SODIUM SERPL-SCNC: 138 MMOL/L (ref 136–145)

## 2024-07-26 PROCEDURE — 6360000002 HC RX W HCPCS: Performed by: PHYSICIAN ASSISTANT

## 2024-07-26 PROCEDURE — 96372 THER/PROPH/DIAG INJ SC/IM: CPT

## 2024-07-26 PROCEDURE — G0378 HOSPITAL OBSERVATION PER HR: HCPCS

## 2024-07-26 PROCEDURE — 80048 BASIC METABOLIC PNL TOTAL CA: CPT

## 2024-07-26 PROCEDURE — 36415 COLL VENOUS BLD VENIPUNCTURE: CPT

## 2024-07-26 PROCEDURE — 93010 ELECTROCARDIOGRAM REPORT: CPT | Performed by: INTERNAL MEDICINE

## 2024-07-26 PROCEDURE — APPNB30 APP NON BILLABLE TIME 0-30 MINS: Performed by: NURSE PRACTITIONER

## 2024-07-26 PROCEDURE — 97530 THERAPEUTIC ACTIVITIES: CPT

## 2024-07-26 PROCEDURE — 99222 1ST HOSP IP/OBS MODERATE 55: CPT | Performed by: NEUROMUSCULOSKELETAL MEDICINE & OMM

## 2024-07-26 PROCEDURE — 2580000003 HC RX 258: Performed by: NURSE PRACTITIONER

## 2024-07-26 PROCEDURE — APPSS30 APP SPLIT SHARED TIME 16-30 MINUTES: Performed by: NURSE PRACTITIONER

## 2024-07-26 PROCEDURE — 97116 GAIT TRAINING THERAPY: CPT

## 2024-07-26 PROCEDURE — 6370000000 HC RX 637 (ALT 250 FOR IP): Performed by: PHYSICIAN ASSISTANT

## 2024-07-26 PROCEDURE — 96361 HYDRATE IV INFUSION ADD-ON: CPT

## 2024-07-26 PROCEDURE — 2580000003 HC RX 258: Performed by: PHYSICIAN ASSISTANT

## 2024-07-26 RX ORDER — NITROGLYCERIN 0.4 MG/1
0.4 TABLET SUBLINGUAL EVERY 5 MIN PRN
COMMUNITY
Start: 2014-02-07

## 2024-07-26 RX ORDER — SODIUM CHLORIDE 9 MG/ML
INJECTION, SOLUTION INTRAVENOUS CONTINUOUS
Status: DISCONTINUED | OUTPATIENT
Start: 2024-07-26 | End: 2024-07-27

## 2024-07-26 RX ORDER — LISINOPRIL 10 MG/1
10 TABLET ORAL DAILY
Status: DISCONTINUED | OUTPATIENT
Start: 2024-07-26 | End: 2024-07-26

## 2024-07-26 RX ADMIN — SODIUM CHLORIDE: 9 INJECTION, SOLUTION INTRAVENOUS at 10:25

## 2024-07-26 RX ADMIN — ENOXAPARIN SODIUM 30 MG: 100 INJECTION SUBCUTANEOUS at 21:35

## 2024-07-26 RX ADMIN — ATORVASTATIN CALCIUM 40 MG: 40 TABLET, FILM COATED ORAL at 21:35

## 2024-07-26 RX ADMIN — SODIUM CHLORIDE, PRESERVATIVE FREE 10 ML: 5 INJECTION INTRAVENOUS at 10:22

## 2024-07-26 RX ADMIN — TAMSULOSIN HYDROCHLORIDE 0.4 MG: 0.4 CAPSULE ORAL at 10:20

## 2024-07-26 RX ADMIN — CLOPIDOGREL BISULFATE 75 MG: 75 TABLET ORAL at 10:20

## 2024-07-26 RX ADMIN — ASPIRIN 81 MG 81 MG: 81 TABLET ORAL at 10:20

## 2024-07-26 RX ADMIN — ENOXAPARIN SODIUM 30 MG: 100 INJECTION SUBCUTANEOUS at 10:21

## 2024-07-26 ASSESSMENT — ENCOUNTER SYMPTOMS
SHORTNESS OF BREATH: 0
VOMITING: 0
ABDOMINAL PAIN: 0
CONSTIPATION: 0
DIARRHEA: 0
COUGH: 0
NAUSEA: 0
BACK PAIN: 0
WHEEZING: 0

## 2024-07-26 NOTE — PROGRESS NOTES
Hospitalist Progress Note      Name:  Mitchel Mckinney /Age/Sex: 1940  (84 y.o. male)   MRN & CSN:  [id number] & [id number] Encounter Date/Time: 2024 10:58 AM EDT   Location:  [facility]3362/3362-01 PCP: Joe Gerber MD     Attending:Miguel Cevallos MD       Hospital Day: 3    Subjective:   Chief Complaint:   Chief Complaint   Patient presents with    Fatigue     Pt via EMS from home, c/o weakness, dizziness and headache and chills since last night     Mitchel Mckinney is a 84 y.o. male with a past medical history of hypertension, hyperlipidemia, bilateral carotid disease, sCHF, AAA s/p repair, DM2, CAD hx of CAB and PCI who presented with flu-like symptoms x 2-3 days and diagnosed with covid infection. He had unsteady gait and was dizzy with presyncope so he was admitted for observation and CVA rule out.  Covid infection positive in ED.  CT head negative for acute process.  CTA head/neck negative for acute process LVO.  MRI brain showed no acute infarct.  Neurology was consulted.     Interval History:  Today, he is ambulating in the room independently.  He feels better.  Dizziness has resolved.  Denies CP or SOB.  No new symptoms today.  No acute events overnight.  Denies weakness, vision changes, speech disturbances.  NIHSS 0.     Independently reviewed interval ancillary notes from neurology.     Assessment and Recommendations   Problem List  Principal Problem:    Unsteady gait  Resolved Problems:    * No resolved hospital problems. *     Assessment and Plan:    Dizziness/Gait unsteady  - Possible CVA, out of the tPA window  - CT head negative for acute process.  CTA head/neck negative for acute process LVO  - MRI showed no acute abnormality  - Continue home ASA, statin  - PT/OT did not recommend any ongoing therapy   - Neurology consult pending  - Orthostatic BP positive with improvement after IVF, all home BP medications were stopped (Coreg 6.25 mg bid and lisinopril 20 mg daily)              ~

## 2024-07-26 NOTE — PROGRESS NOTES
Vascular Progress Note    7/26/2024 10:26 AM    Chief complaint / Reason for visit : carotid stenosis     Subjective:  Patient up in chair.  He reports he is doing okay just complains of being tired.  Denies any neurological changes from baseline.  VSS, afebrile.     Vital Signs: /63   Pulse 84   Temp 98.6 °F (37 °C) (Oral)   Resp 18   Ht 1.829 m (6')   Wt 98.8 kg (217 lb 14.4 oz)   SpO2 93%   BMI 29.55 kg/m²  O2 Flow Rate (L/min): 2 L/min   I/O:    Intake/Output Summary (Last 24 hours) at 7/26/2024 1026  Last data filed at 7/26/2024 0823  Gross per 24 hour   Intake --   Output 550 ml   Net -550 ml       Physical Exam:   General: no apparent distress, appears stated age  Neuro: AAOx4, clear speech, tongue midline, bilateral upper and lower extremity motorsensory intact  Chest/Lungs: no accessory muscle use    Labs:   Lab Results   Component Value Date/Time     07/26/2024 04:44 AM    K 4.6 07/26/2024 04:44 AM     07/26/2024 04:44 AM    CO2 24 07/26/2024 04:44 AM    BUN 23 07/26/2024 04:44 AM    CREATININE 1.5 07/26/2024 04:44 AM    GFRAA >60 08/19/2022 10:49 AM    GFRAA >60 01/23/2013 07:14 AM    LABGLOM 45 07/26/2024 04:44 AM    LABGLOM >60 01/15/2024 08:21 AM    GLUCOSE 96 07/26/2024 04:44 AM    PHOS 3.1 03/10/2020 07:15 AM    CALCIUM 9.1 07/26/2024 04:44 AM     Lab Results   Component Value Date/Time    WBC 4.5 07/25/2024 04:58 AM    RBC 4.42 07/25/2024 04:58 AM    HGB 14.3 07/25/2024 04:58 AM    HCT 41.9 07/25/2024 04:58 AM    MCV 94.6 07/25/2024 04:58 AM    RDW 14.5 07/25/2024 04:58 AM     07/25/2024 04:58 AM     Lab Results   Component Value Date    INR 1.05 07/24/2024    PROTIME 13.9 07/24/2024        Imaging:    Carotid duplex 7/25/24:  Interpretation Summary  The right internal carotid artery appears to have a <50% diameter reducing stenosis based on velocity criteria.  The right vertebral artery demonstrates normal antegrade flow.  The right subclavian artery is visualized

## 2024-07-26 NOTE — PROGRESS NOTES
[facility] - Inpatient Rehabilitation Department   Phone: [phone]    Physical Therapy     []Initial Evaluation            [x] Daily Treatment Note         [] Discharge Summary      Patient: Mitchel Mckinney   : 1940   MRN: [id number]   Date of Service:  2024  Admitting Diagnosis: Unsteady gait  Current Admission Summary: The pt presented with dizziness, cough and fever.  Found to have COVID-19.  MRI of brain showed no acute infarct.   Past Medical History:  has a past medical history of Acquired spondylolisthesis, Arthritis, CAD (coronary artery disease), Carotid disease, bilateral (HCC), Cholelithiasis, Chronic systolic heart failure (HCC), Diabetes mellitus type II, Essential hypertension, Hyperlipidemia, Obesity (BMI 30.0-34.9), Prolonged emergence from general anesthesia, Spinal stenosis of lumbar region, and Status post AAA (abdominal aortic aneurysm) repair.  Past Surgical History:  has a past surgical history that includes Umbilical hernia repair (); Hip Arthroplasty (Left, ); Carotid endarterectomy (Left, ); AAA repair, endovascular (); Coronary artery bypass graft (); Vasectomy (); cyst removal (Right, ); Thyroid surgery (); Retinal detachment surgery (Left, ); Coronary angioplasty with stent (); Rotator cuff repair (Left, 14); Inguinal hernia repair (Left, ); Elbow surgery (Left, ); Total knee arthroplasty (Right, 09/15/2015); other surgical history (2018); Revision total hip arthroplasty (Left, 2019); Carpal tunnel release (Right, 2020); and Carpal tunnel release (Left, 2020).  Discharge Recommendations: Mitchel Mckinney scored a 22/24 on the AM-PAC short mobility form.  At this time, no further PT is recommended upon discharge as the pt is expected to regain his independence once hypotension resolves.  Recommend patient returns to prior setting with prior services.      DME Required For Discharge: no

## 2024-07-26 NOTE — FLOWSHEET NOTE
07/26/24 1000   Vital Signs   Temp 98.6 °F (37 °C)   Temp Source Oral   Pulse 84   Heart Rate Source Monitor   Respirations 18   /63   MAP (Calculated) 76   BP Location Right upper arm   BP Method Automatic   Patient Position Up in chair   Orthostatic B/P and Pulse? Yes   Blood Pressure Lying 118/69   Pulse Lying 85 PER MINUTE   Blood Pressure Sitting 111/67   Pulse Sitting 90 PER MINUTE   Blood Pressure Standing 117/77   Pulse Standing 94 PER MINUTE   Pain Assessment   Pain Assessment None - Denies Pain   Oxygen Therapy   SpO2 93 %   Pulse Oximetry Type Continuous   Pulse Oximeter Device Mode Continuous   Pulse Oximeter Device Location Finger   O2 Device None (Room air)     orthostats

## 2024-07-26 NOTE — CONSULTS
Nephrology Consult Note  [phone]  [phone]   [facility]           Reason for Consult: JULIANO    HISTORY OF PRESENT ILLNESS:                The patient is a 84 y.o.male with significant past medical history of hypertension, DM2, hyperlipidemia, AAA s/p repair, CAD/CABG/PCI, carotid artery stenosis status post carotid Endarterectomy flulike symptoms including generalized weakness, chills, presyncopal episode prior to admission.  CT of the head/MRI with no acute changes  We are consulted for JULIANO  Baseline creatinine appears to be around 1  Creatinine on admission was 1.2 and is 1.5 at the time of consult  Of note lisinopril is listed in home medications  JULIANO presumed to be secondary to volume depletion and was started on isotonic fluids  Lisinopril has been on hold because of JULIANO    Past Medical History:        Diagnosis Date    Acquired spondylolisthesis 1/29/2014    Arthritis     diffuse    CAD (coronary artery disease)     S/P PCI and CABG    Carotid disease, bilateral (HCC) 2/3/2014    Cholelithiasis 11/10/2014    Chronic systolic heart failure (HCC)     Diabetes mellitus type II     Essential hypertension     Hyperlipidemia     Obesity (BMI 30.0-34.9) 2/27/2015    Prolonged emergence from general anesthesia     slow to wake up    Spinal stenosis of lumbar region 1/29/2014    Status post AAA (abdominal aortic aneurysm) repair 11/10/2014       Past Surgical History:        Procedure Laterality Date    ABDOMINAL AORTIC ANEURYSM REPAIR, ENDOVASCULAR  2009    Priscila; stent    CAROTID ENDARTERECTOMY Left 2006    Priscila    CARPAL TUNNEL RELEASE Right 11/20/2020    CARPAL TUNNEL RELEASE Left 12/18/2020    CORONARY ANGIOPLASTY WITH STENT PLACEMENT  2009    CORONARY ARTERY BYPASS GRAFT  2002    Gage, x4    CYST REMOVAL Right 1997    wrist    ELBOW SURGERY Left 1984    laceration to tendons    HIP ARTHROPLASTY Left 1999    Fitzgerald    INGUINAL HERNIA REPAIR Left 1985    OTHER SURGICAL HISTORY  03/06/2018    excision    Musculoskeletal:  Positive for myalgias. Negative for arthralgias and back pain.   Skin:  Negative for pallor and rash.   Neurological:  Negative for dizziness, tremors, seizures, syncope and headaches.   Psychiatric/Behavioral:  Negative for confusion and hallucinations.        PHYSICAL EXAM:      Vitals:    /72   Pulse 92   Temp 98.6 °F (37 °C) (Oral)   Resp 19   Ht 1.829 m (6')   Wt 98.8 kg (217 lb 14.4 oz)   SpO2 94%   BMI 29.55 kg/m²   I/O last 3 completed shifts:  In: 130 [P.O.:120; I.V.:10]  Out: 1250 [Urine:1250]  I/O this shift:  In: -   Out: 400 [Urine:400]    Physical Exam:  General : AAOx3, not in pain or respiratory distress, resting in bed  HEENT : normocephalic, atraumatic, mucosa moist, no palor or icterus  CVS: S1 S2 normal, regular rhythm, no murmurs or rubs.  Lungs: Clear, no wheezing or crackles.  Abd: Soft, bowel sounds normal, non-tender.  Ext: No edema, no cyanosis  Skin: Warm.  No rashes appreciated.  : bladder non-distended, no tenderness over the bladder  Neuro: Alert and oriented x 3, nonfocal.      DATA:    CBC with Differential:    Lab Results   Component Value Date/Time    WBC 4.5 07/25/2024 04:58 AM    RBC 4.42 07/25/2024 04:58 AM    HGB 14.3 07/25/2024 04:58 AM    HCT 41.9 07/25/2024 04:58 AM     07/25/2024 04:58 AM    MCV 94.6 07/25/2024 04:58 AM    MCH 32.4 07/25/2024 04:58 AM    MCHC 34.3 07/25/2024 04:58 AM    RDW 14.5 07/25/2024 04:58 AM    LYMPHOPCT 14.3 07/24/2024 06:28 PM    MONOPCT 10.2 07/24/2024 06:28 PM    EOSPCT 0.1 07/24/2024 06:28 PM    BASOPCT 0.6 07/24/2024 06:28 PM    MONOSABS 0.6 07/24/2024 06:28 PM    LYMPHSABS 0.8 07/24/2024 06:28 PM    EOSABS 0.0 07/24/2024 06:28 PM    BASOSABS 0.0 07/24/2024 06:28 PM    DIFFTYPE Auto-K 01/23/2013 07:14 AM     BMP:    Lab Results   Component Value Date/Time     07/26/2024 04:44 AM    K 4.6 07/26/2024 04:44 AM     07/26/2024 04:44 AM    CO2 24 07/26/2024 04:44 AM    BUN 23 07/26/2024 04:44 AM

## 2024-07-26 NOTE — CONSULTS
Inpatient Neurology consult                                                                                                                                    Name:  Mitchel Mckinney /Age/Sex: 1940  (84 y.o. male)     Date of Service: 2024        Provider: Leana Cuevas MD  Referring Provider: Lori Parish PA-C   Reason for the consult: Stoke like Symptoms                 CC: fatigue, generalized weakness, headache, fever/chills for the past 2 days.         HPI:      Patient is 84 years old who presented to the emergency room with generalized symptoms consistent with viral infection including fever and tachycardic to 109 on arrival.  He tested positive for COVID-19.  Chest x-ray was unremarkable.  He is not hypoxic.      In ED there was a concern about some some subtle past-pointing on finger-nose on the left and seems to veer off to the left with ambulation assessment.      There was some concern in the ED for possible posterior infarct.  Patient was given aspirin.  CT head was unremarkable other than sinus disease, he has moderate stenosis of right ICA and moderate stenosis of origins of bilateral vertebral arteries.  There is no evidence of acute LVO, dissection or aneurysm.  He does have history of extensive atherosclerotic arterial disease and prior endarterectomy of the left carotid aortic aneurysm repair he is already on dual antiplatelet therapy and triptan.    A brain MRI scan this morning showed no acute intracranial abnormality and no acute infarct.     Past medical history:       CAD, CHF, diabetes, hypertension, carotid atherosclerosis, AAA status post endovascular repair    Acquired spondylolisthesis 2014    Arthritis       diffuse    CAD (coronary artery disease)       S/P PCI and CABG    Carotid disease, bilateral (HCC) 2/3/2014    Cholelithiasis 11/10/2014    Chronic systolic heart failure (HCC)      Diabetes mellitus type II      Essential hypertension

## 2024-07-26 NOTE — PLAN OF CARE
Problem: Safety - Adult  Goal: Free from fall injury  7/26/2024 1125 by Nakul Alonso, RN  Outcome: Progressing  Note: Pt remains free from falls. Safety precautions in place. Bed in lowest position, bed wheels locked, call light with in reach, bed alarm on, yellow blanket in place, fall risk wrist band on, SAFE outside of doorway. Will continue to monitor.  7/25/2024 2154 by Adeola Robles, RN  Outcome: Progressing

## 2024-07-26 NOTE — PLAN OF CARE
Problem: Discharge Planning  Goal: Discharge to home or other facility with appropriate resources  7/25/2024 2154 by Adeola Robles RN  Outcome: Progressing  Flowsheets (Taken 7/25/2024 2017)  Discharge to home or other facility with appropriate resources:   Identify barriers to discharge with patient and caregiver   Arrange for needed discharge resources and transportation as appropriate   Identify discharge learning needs (meds, wound care, etc)   Arrange for interpreters to assist at discharge as needed   Refer to discharge planning if patient needs post-hospital services based on physician order or complex needs related to functional status, cognitive ability or social support system  7/25/2024 0844 by Karey Patterson RN  Outcome: Progressing     Problem: Safety - Adult  Goal: Free from fall injury  7/25/2024 2154 by Adeola Robles RN  Outcome: Progressing  7/25/2024 0844 by Karey Patterson RN  Outcome: Progressing     Problem: ABCDS Injury Assessment  Goal: Absence of physical injury  7/25/2024 2154 by Adeola Robles RN  Outcome: Progressing  7/25/2024 0844 by Karey Patterson RN  Outcome: Progressing     Problem: Chronic Conditions and Co-morbidities  Goal: Patient's chronic conditions and co-morbidity symptoms are monitored and maintained or improved  Outcome: Progressing  Flowsheets (Taken 7/25/2024 2017)  Care Plan - Patient's Chronic Conditions and Co-Morbidity Symptoms are Monitored and Maintained or Improved:   Monitor and assess patient's chronic conditions and comorbid symptoms for stability, deterioration, or improvement   Collaborate with multidisciplinary team to address chronic and comorbid conditions and prevent exacerbation or deterioration   Update acute care plan with appropriate goals if chronic or comorbid symptoms are exacerbated and prevent overall improvement and discharge

## 2024-07-27 VITALS
TEMPERATURE: 98.1 F | HEIGHT: 72 IN | WEIGHT: 221.56 LBS | RESPIRATION RATE: 18 BRPM | SYSTOLIC BLOOD PRESSURE: 97 MMHG | OXYGEN SATURATION: 94 % | HEART RATE: 92 BPM | DIASTOLIC BLOOD PRESSURE: 65 MMHG | BODY MASS INDEX: 30.01 KG/M2

## 2024-07-27 PROBLEM — N17.9 AKI (ACUTE KIDNEY INJURY) (HCC): Status: ACTIVE | Noted: 2024-07-27

## 2024-07-27 LAB
ANION GAP SERPL CALCULATED.3IONS-SCNC: 10 MMOL/L (ref 3–16)
BUN SERPL-MCNC: 18 MG/DL (ref 7–20)
CALCIUM SERPL-MCNC: 8.3 MG/DL (ref 8.3–10.6)
CHLORIDE SERPL-SCNC: 104 MMOL/L (ref 99–110)
CO2 SERPL-SCNC: 23 MMOL/L (ref 21–32)
CREAT SERPL-MCNC: 1 MG/DL (ref 0.8–1.3)
GFR SERPLBLD CREATININE-BSD FMLA CKD-EPI: 73 ML/MIN/{1.73_M2}
GLUCOSE BLD-MCNC: 164 MG/DL (ref 70–99)
GLUCOSE BLD-MCNC: 85 MG/DL (ref 70–99)
GLUCOSE SERPL-MCNC: 95 MG/DL (ref 70–99)
PERFORMED ON: ABNORMAL
PERFORMED ON: NORMAL
POTASSIUM SERPL-SCNC: 3.9 MMOL/L (ref 3.5–5.1)
SODIUM SERPL-SCNC: 137 MMOL/L (ref 136–145)

## 2024-07-27 PROCEDURE — 94761 N-INVAS EAR/PLS OXIMETRY MLT: CPT

## 2024-07-27 PROCEDURE — 96372 THER/PROPH/DIAG INJ SC/IM: CPT

## 2024-07-27 PROCEDURE — G0378 HOSPITAL OBSERVATION PER HR: HCPCS

## 2024-07-27 PROCEDURE — 6360000002 HC RX W HCPCS: Performed by: PHYSICIAN ASSISTANT

## 2024-07-27 PROCEDURE — 80048 BASIC METABOLIC PNL TOTAL CA: CPT

## 2024-07-27 PROCEDURE — 94640 AIRWAY INHALATION TREATMENT: CPT

## 2024-07-27 PROCEDURE — 36415 COLL VENOUS BLD VENIPUNCTURE: CPT

## 2024-07-27 PROCEDURE — 6370000000 HC RX 637 (ALT 250 FOR IP): Performed by: PHYSICIAN ASSISTANT

## 2024-07-27 PROCEDURE — 96361 HYDRATE IV INFUSION ADD-ON: CPT

## 2024-07-27 PROCEDURE — 2580000003 HC RX 258: Performed by: PHYSICIAN ASSISTANT

## 2024-07-27 PROCEDURE — 6370000000 HC RX 637 (ALT 250 FOR IP): Performed by: HOSPITALIST

## 2024-07-27 PROCEDURE — 2060000000 HC ICU INTERMEDIATE R&B

## 2024-07-27 PROCEDURE — 6370000000 HC RX 637 (ALT 250 FOR IP): Performed by: PSYCHIATRY & NEUROLOGY

## 2024-07-27 RX ORDER — IPRATROPIUM BROMIDE AND ALBUTEROL SULFATE 2.5; .5 MG/3ML; MG/3ML
1 SOLUTION RESPIRATORY (INHALATION)
Status: DISCONTINUED | OUTPATIENT
Start: 2024-07-27 | End: 2024-07-27 | Stop reason: HOSPADM

## 2024-07-27 RX ORDER — IPRATROPIUM BROMIDE AND ALBUTEROL SULFATE 2.5; .5 MG/3ML; MG/3ML
1 SOLUTION RESPIRATORY (INHALATION)
Status: DISCONTINUED | OUTPATIENT
Start: 2024-07-27 | End: 2024-07-27

## 2024-07-27 RX ORDER — LISINOPRIL 20 MG/1
20 TABLET ORAL DAILY
Qty: 30 TABLET | Refills: 3 | Status: SHIPPED | OUTPATIENT
Start: 2024-07-28

## 2024-07-27 RX ORDER — LISINOPRIL 20 MG/1
20 TABLET ORAL DAILY
Status: DISCONTINUED | OUTPATIENT
Start: 2024-07-27 | End: 2024-07-27 | Stop reason: HOSPADM

## 2024-07-27 RX ORDER — IPRATROPIUM BROMIDE AND ALBUTEROL SULFATE 2.5; .5 MG/3ML; MG/3ML
1 SOLUTION RESPIRATORY (INHALATION) EVERY 4 HOURS PRN
Status: DISCONTINUED | OUTPATIENT
Start: 2024-07-27 | End: 2024-07-27 | Stop reason: HOSPADM

## 2024-07-27 RX ADMIN — IPRATROPIUM BROMIDE AND ALBUTEROL SULFATE 1 DOSE: .5; 3 SOLUTION RESPIRATORY (INHALATION) at 08:26

## 2024-07-27 RX ADMIN — TAMSULOSIN HYDROCHLORIDE 0.4 MG: 0.4 CAPSULE ORAL at 08:24

## 2024-07-27 RX ADMIN — ASPIRIN 81 MG 81 MG: 81 TABLET ORAL at 08:24

## 2024-07-27 RX ADMIN — CLOPIDOGREL BISULFATE 75 MG: 75 TABLET ORAL at 08:24

## 2024-07-27 RX ADMIN — LISINOPRIL 20 MG: 20 TABLET ORAL at 09:03

## 2024-07-27 RX ADMIN — SODIUM CHLORIDE, PRESERVATIVE FREE 10 ML: 5 INJECTION INTRAVENOUS at 08:24

## 2024-07-27 RX ADMIN — ENOXAPARIN SODIUM 30 MG: 100 INJECTION SUBCUTANEOUS at 08:24

## 2024-07-27 NOTE — PROGRESS NOTES
Nephrology Consult Note  [phone]   [employer]           Reason for Consult: JULIANO    HISTORY OF PRESENT ILLNESS:       84 y.o.male with significant past medical history of hypertension, DM2, hyperlipidemia, AAA s/p repair, CAD/CABG/PCI, carotid artery stenosis status post carotid Endarterectomy flulike symptoms including generalized weakness, chills, presyncopal episode prior to admission.  CT of the head/MRI with no acute changes  We are consulted for JULIANO  Baseline creatinine appears to be around 1  Creatinine on admission was 1.2 and is 1.5 at the time of consult  Of note lisinopril is listed in home medications  JULIANO presumed to be secondary to volume depletion and was started on isotonic fluids  Lisinopril has been on hold because of JULIANO    Scheduled Meds:   ipratropium 0.5 mg-albuterol 2.5 mg  1 Dose Inhalation BID RT    aspirin  81 mg Oral Daily    atorvastatin  40 mg Oral Nightly    clopidogrel  75 mg Oral Daily    tamsulosin  0.4 mg Oral Daily    sodium chloride flush  10 mL IntraVENous 2 times per day    enoxaparin  30 mg SubCUTAneous BID    insulin lispro  0-4 Units SubCUTAneous TID WC    insulin lispro  0-4 Units SubCUTAneous Nightly     Continuous Infusions:   sodium chloride Stopped (07/27/24 0301)    sodium chloride      dextrose       PRN Meds:.ipratropium 0.5 mg-albuterol 2.5 mg, sodium chloride flush, sodium chloride, acetaminophen **OR** acetaminophen, dextrose bolus **OR** dextrose bolus, glucagon (rDNA), dextrose, senna, ondansetron         PHYSICAL EXAM:      Vitals:    BP (!) 186/100   Pulse 76   Temp 97.8 °F (36.6 °C) (Oral)   Resp 18   Ht 1.829 m (6')   Wt 100.5 kg (221 lb 9 oz)   SpO2 95%   BMI 30.05 kg/m²   I/O last 3 completed shifts:  In: 1257.5 [P.O.:240; I.V.:1017.5]  Out: 1950 [Urine:1950]  I/O this shift:  In: 10 [I.V.:10]  Out: -     Physical Exam:  General : AAOx3, not in pain or respiratory distress, resting in bed  HEENT : normocephalic, atraumatic,

## 2024-07-27 NOTE — PROGRESS NOTES
2337  Pt's BP medications were d/c'd due to hypotension and positive orthostatic BP evaluation. Pt's BP  have become elevated and pt has NS @ 75 ml/hr infusing. NP was messaged about situation.    0305 Fluids stopped after discussion with NP, due to steadily increasing BP throughout the night.    0715 Shift change, bedside report given to  Karey RIVAS. Pt exhibits no s/s of distress. Call light in reach. Care has been transferred at this time.

## 2024-07-27 NOTE — DISCHARGE SUMMARY
Patient: Mitchel Mckinney     Gender: male  : 1940   Age: 84 y.o.  MRN: [id number]    Admitting Physician: Ryann Livingston MD  Discharge Physician: Jeffery Moore MD     Code Status: Full Code     Admit Date: 2024   Discharge Date:   24    Disposition:  Home    Discharge Diagnoses:  Dizziness secondary to orthostatic hypotension resolved  JULIANO prerenal resolved  Right carotid artery stenosis      Active Hospital Problems    Diagnosis Date Noted    Carotid stenosis, asymptomatic, right [I65.21] 2024    Unsteady gait [R26.81] 2024       Follow-up appointments:  one week    Outpatient to do list: none     Condition at Discharge:  Stable    Hospital Course:   Mitchel Mckinney is a 84 y.o. male with a past medical history of hypertension, hyperlipidemia, bilateral carotid disease, sCHF, AAA s/p repair, DM2, CAD hx of CAB and PCI who presented with flu-like symptoms x 2-3 days and diagnosed with covid infection. He had unsteady gait and was dizzy with presyncope so he was admitted for observation and CVA rule out.  Covid infection positive in ED.  CT head negative for acute process.  CTA head/neck negative for acute process LVO.  MRI brain showed no acute infarct.  Neurology was consulted.   Dizziness secondary to orthostatic hypotension  CT of the head CT angiogram of the head and MRI of the brain was negative  Orthostatics were positive  Initially all blood pressure medications were held  He also had a acute kidney injury which resolved with IV fluids  Subsequently per nephrology recommendation lisinopril was started at half the home half his home dose of 20 mg  Orthostatics remain negative    Right carotid stenosis/History of carotid stenosis s/p L CEA /History of AAA s/p repair   - CTA head/neck notable for moderate stenosis of the right ICA at its takeoff, approximately 60% diameter narrowing  - No need for any vascular intervention on the right ICA at this time Will need to continue  0      aspirin 81 MG tablet Take 1 tablet by mouth daily           Current Discharge Medication List        STOP taking these medications       carvedilol (COREG) 6.25 MG tablet Comments:   Reason for Stopping:         isosorbide mononitrate (IMDUR) 60 MG extended release tablet Comments:   Reason for Stopping:               Discharge ROS:  A complete review of systems was asked and negative   Discharge Exam:    BP 97/65   Pulse 87   Temp 98.1 °F (36.7 °C) (Oral)   Resp 18   Ht 1.829 m (6')   Wt 100.5 kg (221 lb 9 oz)   SpO2 94%   BMI 30.05 kg/m²   General appearance:  NAD  HEENT:   Normal cephalic, atraumatic, moist mucous membranes, no oropharyngeal erythema or exudate  Heart:: Normal s1/s2, RRR, no murmurs, gallops, or rubs. no leg edema  Lungs:  Normal respiratory effort. Clear to auscultation, bilaterally without Rales/Wheezes/Rhonchi.  Abdomen: Soft, non-tender, non-distended, bowel sounds present, no masses  Musculoskeletal:  No clubbing, no cyanosis, *  Neurologic:  Neurovascularly intact without any focal sensory/motor deficits. Cranial nerves: II-XII intact, grossly non-focal.    Labs: For convenience and continuity at follow-up the following most recent labs are provided:    Lab Results   Component Value Date/Time    WBC 4.5 07/25/2024 04:58 AM    HGB 14.3 07/25/2024 04:58 AM    HCT 41.9 07/25/2024 04:58 AM    MCV 94.6 07/25/2024 04:58 AM     07/25/2024 04:58 AM     07/27/2024 04:40 AM    K 3.9 07/27/2024 04:40 AM     07/27/2024 04:40 AM    CO2 23 07/27/2024 04:40 AM    BUN 18 07/27/2024 04:40 AM    CREATININE 1.0 07/27/2024 04:40 AM    CALCIUM 8.3 07/27/2024 04:40 AM    PHOS 3.1 03/10/2020 07:15 AM    ALKPHOS 132 07/24/2024 06:28 PM    ALT 13 07/24/2024 06:28 PM    AST 23 07/24/2024 06:28 PM    BILITOT 1.0 07/24/2024 06:28 PM    BILIDIR <0.2 11/23/2020 03:26 AM    TRIG 73 07/25/2024 04:58 AM     Lab Results   Component Value Date    INR 1.05 07/24/2024    INR 0.96 11/23/2020

## 2024-07-27 NOTE — CARE COORDINATION
Case Management -  Discharge Note      Patient Name: Mitchel Mckinney                   YOB: 1940            Readmission Risk (Low < 19, Mod (19-27), High > 27): Readmission Risk Score: 9.9    Current PCP: Joe Gerber MD    ([facility]) Important Message from Medicare:    Date: 7/27/2024    PT AM-PAC: 22 /24  OT AM-PAC: 22 /24    Home Care Information:   Is patient resuming current home health care services: No    Home Care Agency:   ***            Services: PT/OT/SN  Home Health Order Obtained: Yes    Home health agency notified of discharge.       Financial    Payor: [employer] [facility] DUAL BENEFITS / Plan: ALLWELL FROM [employer] PLAN / Product Type: *No Product type* /     Pharmacy:  Potential assistance Purchasing Medications: No  Meds-to-Beds request:        [facility] - [facility] [address] [facility] - P [phone] - F [phone] [address] [facility] [address]  Phone: [phone] Fax: [phone]    [id number] [place], [place] - [address] [place] [address] -  [phone] - F [phone]  [address] [place] [id number]  Phone: [phone] Fax: [phone]      Notes:    Additional Case Management Notes:       Pt's daughter, Cathy, to transport pt home upon d/c.

## 2024-07-27 NOTE — CARE COORDINATION
Case Management Assessment  Initial Evaluation    Date/Time of Evaluation: 7/27/2024 1:21 PM  Assessment Completed by: GINA Gonzalez    If patient is discharged prior to next notation, then this note serves as note for discharge by case management.    Patient Name: Mitchel Mckinney                   YOB: 1940  Diagnosis: Unsteady gait [R26.81]  Gait instability [R26.81]  General weakness [R53.1]  NSTEMI (non-ST elevated myocardial infarction) ([facility]) [I21.4]  COVID [U07.1]  JULIANO (acute kidney injury) ([facility]) [N17.9]                   Date / Time: 7/24/2024  5:40 PM    Patient Admission Status: Inpatient   Readmission Risk (Low < 19, Mod (19-27), High > 27): Readmission Risk Score: 9.9    Current PCP: Joe Gerber MD  PCP verified by CM? Yes    Chart Reviewed: Yes      History Provided by: Patient  Patient Orientation: Alert and Oriented    Patient Cognition: Alert    Hospitalization in the last 30 days (Readmission):  No    If yes, Readmission Assessment in CM Navigator will be completed.    Advance Directives:      Code Status: Full Code   Patient's Primary Decision Maker is: Named in Scanned ACP Document    Primary Decision Maker: Cathy Mckinney - Child - [phone]    Secondary Decision Maker: Raheel Mckinney - Elvira - [phone]    Discharge Planning:    Patient lives with: Alone Type of Home: House  Primary Care Giver: Self  Patient Support Systems include: Children, Family Members   Current Financial resources: Medicare  Current community resources: None  Current services prior to admission: Durable Medical Equipment            Current DME: Wheelchair, Cane            Type of Home Care services:  None    ADLS  Prior functional level: Independent in ADLs/IADLs  Current functional level: Independent in ADLs/IADLs    PT AM-PAC: 22 /24  OT AM-PAC: 22 /24    Family can provide assistance at DC: No  Would you like Case Management to discuss the discharge plan with any other family members/significant

## 2024-07-27 NOTE — CARE COORDINATION
07/27/24 1323   IMM Letter   IMM Letter given to Patient/Family/Significant other/Guardian/POA/by:  Sakshi explained the IMM to the patient over the phone. The patient was in agreement with discharge and gave verbal signature.   IMM Letter date given: 07/27/24   IMM Letter time given: 1320           Electronically signed by GINA Gonzalez on 7/27/2024 at 1:24 PM

## 2024-07-27 NOTE — PLAN OF CARE
Problem: Discharge Planning  Goal: Discharge to home or other facility with appropriate resources  Outcome: Completed     Problem: Safety - Adult  Goal: Free from fall injury  Outcome: Completed     Problem: ABCDS Injury Assessment  Goal: Absence of physical injury  Outcome: Completed     Problem: Chronic Conditions and Co-morbidities  Goal: Patient's chronic conditions and co-morbidity symptoms are monitored and maintained or improved  Outcome: Completed

## 2024-07-27 NOTE — DISCHARGE INSTR - COC
Continuity of Care Form    Patient Name: Mitchel Mckinney   :  1940  MRN:  [id number]    Admit date:  2024  Discharge date:  2024    Code Status Order: Full Code   Advance Directives:     Admitting Physician:  Ryann Livingston MD  PCP: Joe Gerber MD    Discharging Nurse: Karey Ontiveros Hospital Unit/Room#: 3TN-3362/3362-01  Discharging Unit Phone Number: [phone]    Emergency Contact:   Extended Emergency Contact Information  Primary Emergency Contact: Cathy Mckinney   [facility]  Home Phone: [phone]  Work Phone: [phone]  Relation: Child  Secondary Emergency Contact: Raheel Mckinney   [facility]  Home Phone: [phone]  Relation: Child    Past Surgical History:  Past Surgical History:   Procedure Laterality Date    ABDOMINAL AORTIC ANEURYSM REPAIR, ENDOVASCULAR      Priscila; stent    CAROTID ENDARTERECTOMY Left 2006yyat    CARPAL TUNNEL RELEASE Right 2020    CARPAL TUNNEL RELEASE Left 2020    CORONARY ANGIOPLASTY WITH STENT PLACEMENT  2009    CORONARY ARTERY BYPASS GRAFT  2002    Gage, xAnderson    CYST REMOVAL Right     wrist    ELBOW SURGERY Left     laceration to tendons    HIP ARTHROPLASTY Left     Fitzgerald    INGUINAL HERNIA REPAIR Left     OTHER SURGICAL HISTORY  2018    excision of sebacious cyst on back    RETINAL DETACHMENT SURGERY Left     CEI    REVISION TOTAL HIP ARTHROPLASTY Left 2019    LEFT TOTAL HIP ARTHROPLASTY REVISION POSTERIOR APPROACH performed by Christian Pina MD at [facility] OR    ROTATOR CUFF REPAIR Left 14    [facility]    THYROID SURGERY  2012    biopsied at [facility]    TOTAL KNEE ARTHROPLASTY Right 09/15/2015    UMBILICAL HERNIA REPAIR  1987    VASECTOMY  1975       Immunization History:   Immunization History   Administered Date(s) Administered    COVID-19, MODERNA BLUE border, Primary or Immunocompromised, (age 12y+), IM, 100 mcg/0.5mL 2021, 2021, 10/26/2021    Influenza Vaccine,

## 2024-07-27 NOTE — RT PROTOCOL NOTE
RT Inhaler-Nebulizer Bronchodilator Protocol Note    There is a bronchodilator order in the chart from a provider indicating to follow the RT Bronchodilator Protocol and there is an “Initiate RT Inhaler-Nebulizer Bronchodilator Protocol” order as well (see protocol at bottom of note).    CXR Findings:  No results found.    The findings from the last RT Protocol Assessment were as follows:   History Pulmonary Disease: Smoker 15 pack years or more  Respiratory Pattern: Regular pattern and RR 12-20 bpm  Breath Sounds: Slightly diminished and/or crackles  Cough: Strong, productive  Indication for Bronchodilator Therapy:    Bronchodilator Assessment Score: 4    Aerosolized bronchodilator medication orders have been revised according to the RT Inhaler-Nebulizer Bronchodilator Protocol below.    Respiratory Therapist to perform RT Therapy Protocol Assessment initially then follow the protocol.  Repeat RT Therapy Protocol Assessment PRN for score 0-3 or on second treatment, BID, and PRN for scores above 3.    No Indications - adjust the frequency to every 6 hours PRN wheezing or bronchospasm, if no treatments needed after 48 hours then discontinue using Per Protocol order mode.     If indication present, adjust the RT bronchodilator orders based on the Bronchodilator Assessment Score as indicated below.  Use Inhaler orders unless patient has one or more of the following: on home nebulizer, not able to hold breath for 10 seconds, is not alert and oriented, cannot activate and use MDI correctly, or respiratory rate 25 breaths per minute or more, then use the equivalent nebulizer order(s) with same Frequency and PRN reasons based on the score.  If a patient is on this medication at home then do not decrease Frequency below that used at home.    0-3 - enter or revise RT bronchodilator order(s) to equivalent RT Bronchodilator order with Frequency of every 4 hours PRN for wheezing or increased work of breathing using Per Protocol  order mode.        4-6 - enter or revise RT Bronchodilator order(s) to two equivalent RT bronchodilator orders with one order with BID Frequency and one order with Frequency of every 4 hours PRN wheezing or increased work of breathing using Per Protocol order mode.        7-10 - enter or revise RT Bronchodilator order(s) to two equivalent RT bronchodilator orders with one order with TID Frequency and one order with Frequency of every 4 hours PRN wheezing or increased work of breathing using Per Protocol order mode.       11-13 - enter or revise RT Bronchodilator order(s) to one equivalent RT bronchodilator order with QID Frequency and an Albuterol order with Frequency of every 4 hours PRN wheezing or increased work of breathing using Per Protocol order mode.      Greater than 13 - enter or revise RT Bronchodilator order(s) to one equivalent RT bronchodilator order with every 4 hours Frequency and an Albuterol order with Frequency of every 2 hours PRN wheezing or increased work of breathing using Per Protocol order mode.     RT to enter RT Home Evaluation for COPD & MDI Assessment order using Per Protocol order mode.    Electronically signed by Latha Martin RCP on 7/27/2024 at 3:31 AM

## 2024-07-27 NOTE — PROGRESS NOTES
Pt d/c'd home with home care.  Removed PIV and stopped bleeding.  Catheter intact. Pt tolerated well. No redness noted at site.  Notified [facility] and removed tele box. Reviewed d/c instructions, home meds, and  f/u information utilizing teach-back method. Patient verbalized understanding. Transported to [facility] via wheelchair

## 2024-07-28 LAB
BACTERIA BLD CULT ORG #2: NORMAL
BACTERIA BLD CULT: NORMAL

## 2024-07-29 ENCOUNTER — CARE COORDINATION (OUTPATIENT)
Dept: CARE COORDINATION | Age: 84
End: 2024-07-29

## 2024-07-29 DIAGNOSIS — I65.21 CAROTID STENOSIS, ASYMPTOMATIC, RIGHT: Primary | ICD-10-CM

## 2024-07-29 PROCEDURE — 1111F DSCHRG MED/CURRENT MED MERGE: CPT

## 2024-07-29 NOTE — CARE COORDINATION
Care Transitions Note    Initial Call - Call within 2 business days of discharge: Yes    Patient Current Location:  [place]    Care Transition Nurse contacted the patient by telephone to perform post hospital discharge assessment, verified name and  as identifiers. Provided introduction to self, and explanation of the Care Transition Nurse role.     Patient: Mitchel Mckinney    Patient : 1940   MRN: [id number]    Reason for Admission: NSTEMI  Discharge Date: 24  RURS: Readmission Risk Score: 9.9      Last Discharge Facility       Date Complaint Diagnosis Description Type Department Provider    24 Fatigue NSTEMI (non-ST elevated myocardial infarction) (HCC) ... ED to Hosp-Admission (Discharged) (ADMITTED) [facility] 3[facility] Jeffery Moore MD; Sa. Becca..            Was this an external facility discharge? No    Additional needs identified to be addressed with provider   No needs identified             Method of communication with provider: none.    Patients top risk factors for readmission: medical condition-  and medication management    Interventions to address risk factors:   Education:    Review of patient management of conditions/medications:      Care Summary Note: Pt states he's doing well. Denies CP, palpitations, lightheadedness, dizziness, fever or flu-like symptoms. Reports some ongoing fatigue and occasional SOB at rest that resolves itself quickly. States this has been happening to him for a long time. Reports he weighs himself about every other day and weight is 213 lbs. Discussed when to call the doctor for weight gain or new or worsening symptoms.  States he checks BS about twice/week but not recently. Discussed importance of self-monitoring. Meds reviewed. Pt is awaiting his new medications to come in the mail - states it usually takes a couple days. F/u call scheduled.     Care Transition Nurse reviewed discharge instructions, medical action plan, and red flags with patient. The patient

## 2024-08-05 ENCOUNTER — CARE COORDINATION (OUTPATIENT)
Dept: CASE MANAGEMENT | Age: 84
End: 2024-08-05

## 2024-08-05 NOTE — CARE COORDINATION
Care Transitions Note    Follow Up Call     Attempted to reach patient for transitions of care follow up.  Unable to reach patient. Will send message to PCP office to schedule a hospital f/u visit.        Outreach Attempts:   Unable to leave message.     Care Summary Note:     Follow Up Appointment:   Future Appointments         Provider Specialty Dept Phone    8/19/2024 1:00 PM Aquiles Foley MD Cardiology [phone]            Plan for follow-up call in 2-5 days based on severity of symptoms and risk factors. Plan for next call: self management-     Jonah Hernández RN

## 2024-08-07 ENCOUNTER — CARE COORDINATION (OUTPATIENT)
Dept: CASE MANAGEMENT | Age: 84
End: 2024-08-07

## 2024-08-07 ENCOUNTER — OFFICE VISIT (OUTPATIENT)
Dept: FAMILY MEDICINE CLINIC | Age: 84
End: 2024-08-07

## 2024-08-07 VITALS
OXYGEN SATURATION: 94 % | HEIGHT: 72 IN | SYSTOLIC BLOOD PRESSURE: 125 MMHG | DIASTOLIC BLOOD PRESSURE: 80 MMHG | HEART RATE: 80 BPM | RESPIRATION RATE: 20 BRPM | WEIGHT: 225 LBS | BODY MASS INDEX: 30.48 KG/M2

## 2024-08-07 DIAGNOSIS — I95.1 ORTHOSTATIC HYPOTENSION: ICD-10-CM

## 2024-08-07 DIAGNOSIS — R42 DIZZINESS: ICD-10-CM

## 2024-08-07 DIAGNOSIS — N17.9 ACUTE KIDNEY INJURY (HCC): ICD-10-CM

## 2024-08-07 DIAGNOSIS — I10 HYPERTENSION, UNSPECIFIED TYPE: ICD-10-CM

## 2024-08-07 DIAGNOSIS — Z09 HOSPITAL DISCHARGE FOLLOW-UP: Primary | ICD-10-CM

## 2024-08-07 DIAGNOSIS — U07.1 COVID-19: ICD-10-CM

## 2024-08-07 RX ORDER — LISINOPRIL 10 MG/1
10 TABLET ORAL DAILY
Qty: 45 TABLET | Refills: 1 | Status: SHIPPED | OUTPATIENT
Start: 2024-08-07

## 2024-08-07 SDOH — ECONOMIC STABILITY: FOOD INSECURITY: WITHIN THE PAST 12 MONTHS, YOU WORRIED THAT YOUR FOOD WOULD RUN OUT BEFORE YOU GOT MONEY TO BUY MORE.: NEVER TRUE

## 2024-08-07 SDOH — ECONOMIC STABILITY: HOUSING INSECURITY
IN THE LAST 12 MONTHS, WAS THERE A TIME WHEN YOU DID NOT HAVE A STEADY PLACE TO SLEEP OR SLEPT IN A SHELTER (INCLUDING NOW)?: NO

## 2024-08-07 SDOH — ECONOMIC STABILITY: INCOME INSECURITY: HOW HARD IS IT FOR YOU TO PAY FOR THE VERY BASICS LIKE FOOD, HOUSING, MEDICAL CARE, AND HEATING?: NOT HARD AT ALL

## 2024-08-07 SDOH — ECONOMIC STABILITY: FOOD INSECURITY: WITHIN THE PAST 12 MONTHS, THE FOOD YOU BOUGHT JUST DIDN'T LAST AND YOU DIDN'T HAVE MONEY TO GET MORE.: NEVER TRUE

## 2024-08-07 ASSESSMENT — PATIENT HEALTH QUESTIONNAIRE - PHQ9
2. FEELING DOWN, DEPRESSED OR HOPELESS: NOT AT ALL
SUM OF ALL RESPONSES TO PHQ QUESTIONS 1-9: 0
1. LITTLE INTEREST OR PLEASURE IN DOING THINGS: NOT AT ALL
SUM OF ALL RESPONSES TO PHQ QUESTIONS 1-9: 0
SUM OF ALL RESPONSES TO PHQ9 QUESTIONS 1 & 2: 0
SUM OF ALL RESPONSES TO PHQ QUESTIONS 1-9: 0
SUM OF ALL RESPONSES TO PHQ QUESTIONS 1-9: 0

## 2024-08-07 NOTE — CARE COORDINATION
Care Transitions Outreach Attempt    Per Epic, pt has a PCP hospital f/u appt  today, this nurse will follow up at a later date.    Patient: Mitchel Mckinney Patient : 1940 MRN: [id number]    Last Discharge Facility       Date Complaint Diagnosis Description Type Department Provider    24 Fatigue NSTEMI (non-ST elevated myocardial infarction) (HCC) ... ED to Hosp-Admission (Discharged) (ADMITTED) [facility] 3T Jeffery Moore MD; Sa Becca...                Noted following upcoming appointments from discharge chart review:   [facility] follow up appointment(s):   Future Appointments   Date Time Provider Department Center   2024  1:00 PM Aquiles Foley MD [facility] Cardio [facility]     Non-[facility]  follow up appointment(s):        unk

## 2024-08-07 NOTE — PROGRESS NOTES
[facility]  Clinic Note    Date: 8/7/2024                                               /Objective:     Chief Complaint   Patient presents with    Follow-Up from Hospital       HPI  Patient is here for hospital follow-up.  Presented to [facility] on 7/24/2024 flulike symptoms.  He was found to have COVID-19.  He had an unsteady gait and was dizzy with presyncope, so he is admitted for observation and CVA rule out.  CT scan of his head was negative for acute process.  CTA of the head and neck was negative for acute process.  MRI of the brain showed no acute infarct.  Neurology was consulted.  It was thought that the dizziness was secondary to orthostatic hypotension.  Orthostatics were positive.  Initial blood pressure medications were held.  He also had acute kidney injury which resolved with IV fluids.  Per neurology, his lisinopril was restarted at half of his home dose, so he was discharged home on 20 mg.    Since going home, patient feels well.  Still feels slightly lightheaded when he is moving around. No nausea or room-spinning.  No fall.  No chest pain or shortness of breath.         Patient Active Problem List    Diagnosis Date Noted    Essential hypertension, benign 07/12/2011    CAD (coronary artery disease) 07/12/2011    Acute coronary syndrome (HCC) 07/13/2022    Chronic renal disease, stage III ([facility]) [732724] 07/08/2022    Status post AAA (abdominal aortic aneurysm) repair 11/10/2014    Chronic systolic heart failure (HCC)     JULIANO (acute kidney injury) ([facility]) 07/27/2024    Carotid stenosis, asymptomatic, right 07/26/2024    Unsteady gait 07/24/2024    Acute chest pain 01/13/2024    Syncope and collapse 11/22/2020    Sebaceous cyst     Vestibular dizziness involving left inner ear 08/10/2017    Hyperlipidemia 01/27/2017    Arthritis of right knee 09/15/2015    Obesity (BMI 30.0-34.9) 02/27/2015    Cholelithiasis 11/10/2014    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

## 2024-08-12 ENCOUNTER — CARE COORDINATION (OUTPATIENT)
Dept: CASE MANAGEMENT | Age: 84
End: 2024-08-12

## 2024-08-12 NOTE — CARE COORDINATION
Care Transitions Note    Final Call     Attempted to reach patient for transitions of care follow up.  Unable to reach patient.      Outreach Attempts:   HIPAA compliant voicemail left for patient.     Patient closed (unable to reach patient) from the Care Transitions program on 8/12/24.  Patient/family has the ability to self manage at this time..      Handoff:   Patient was not referred to the ACM team due to unable to contact patient.      Care Summary Note:     Assessments:  Care Transitions Subsequent and Final Call    Subsequent and Final Calls  Care Transitions Interventions  Other Interventions:              Upcoming Appointments:    Future Appointments         Provider Specialty Dept Phone    8/19/2024 1:00 PM Aquiles Foley MD Cardiology [phone]            Jonah Hernández RN

## 2024-08-19 ENCOUNTER — OFFICE VISIT (OUTPATIENT)
Dept: CARDIOLOGY CLINIC | Age: 84
End: 2024-08-19
Payer: COMMERCIAL

## 2024-08-19 VITALS
BODY MASS INDEX: 30.8 KG/M2 | SYSTOLIC BLOOD PRESSURE: 144 MMHG | OXYGEN SATURATION: 93 % | HEART RATE: 84 BPM | DIASTOLIC BLOOD PRESSURE: 84 MMHG | HEIGHT: 72 IN | WEIGHT: 227.4 LBS

## 2024-08-19 DIAGNOSIS — E78.2 MIXED HYPERLIPIDEMIA: ICD-10-CM

## 2024-08-19 DIAGNOSIS — I10 ESSENTIAL HYPERTENSION, BENIGN: ICD-10-CM

## 2024-08-19 DIAGNOSIS — R55 SYNCOPE AND COLLAPSE: ICD-10-CM

## 2024-08-19 DIAGNOSIS — Z98.890 STATUS POST AAA (ABDOMINAL AORTIC ANEURYSM) REPAIR: ICD-10-CM

## 2024-08-19 DIAGNOSIS — I25.10 CORONARY ARTERY DISEASE INVOLVING NATIVE CORONARY ARTERY OF NATIVE HEART, UNSPECIFIED WHETHER ANGINA PRESENT: Primary | ICD-10-CM

## 2024-08-19 DIAGNOSIS — I65.23 BILATERAL CAROTID ARTERY STENOSIS: ICD-10-CM

## 2024-08-19 DIAGNOSIS — Z86.79 STATUS POST AAA (ABDOMINAL AORTIC ANEURYSM) REPAIR: ICD-10-CM

## 2024-08-19 PROCEDURE — 99214 OFFICE O/P EST MOD 30 MIN: CPT | Performed by: INTERNAL MEDICINE

## 2024-08-19 PROCEDURE — 1123F ACP DISCUSS/DSCN MKR DOCD: CPT | Performed by: INTERNAL MEDICINE

## 2024-08-19 NOTE — PATIENT INSTRUCTIONS
No medication changes  Keep monitoring BP at home and write it down  Continue risk factor modifications.   Call for any change in symptoms, call to report any changes in shortness of breath or development of chest pain with activity.    Follow up in 3 mos with NP and in 6 mos with Dr Foley

## 2024-11-19 ENCOUNTER — TELEPHONE (OUTPATIENT)
Dept: CARDIOLOGY CLINIC | Age: 84
End: 2024-11-19

## 2024-11-19 ENCOUNTER — HOSPITAL ENCOUNTER (OUTPATIENT)
Age: 84
Discharge: HOME | End: 2024-11-19
Payer: COMMERCIAL

## 2024-11-19 ENCOUNTER — OFFICE VISIT (OUTPATIENT)
Dept: CARDIOLOGY CLINIC | Age: 84
End: 2024-11-19

## 2024-11-19 VITALS
SYSTOLIC BLOOD PRESSURE: 178 MMHG | DIASTOLIC BLOOD PRESSURE: 88 MMHG | OXYGEN SATURATION: 93 % | WEIGHT: 238.3 LBS | HEART RATE: 60 BPM | HEIGHT: 72 IN | BODY MASS INDEX: 32.28 KG/M2

## 2024-11-19 DIAGNOSIS — I10 ESSENTIAL HYPERTENSION, BENIGN: ICD-10-CM

## 2024-11-19 DIAGNOSIS — I25.10 CORONARY ARTERY DISEASE INVOLVING NATIVE CORONARY ARTERY OF NATIVE HEART WITHOUT ANGINA PECTORIS: ICD-10-CM

## 2024-11-19 DIAGNOSIS — E78.2 MIXED HYPERLIPIDEMIA: ICD-10-CM

## 2024-11-19 DIAGNOSIS — I50.22 CHRONIC SYSTOLIC HEART FAILURE (HCC): ICD-10-CM

## 2024-11-19 DIAGNOSIS — R42 DIZZINESS: Primary | ICD-10-CM

## 2024-11-19 LAB
ANION GAP SERPL CALCULATED.3IONS-SCNC: 9 MMOL/L (ref 3–16)
BUN SERPL-MCNC: 17 MG/DL (ref 7–20)
CALCIUM SERPL-MCNC: 9.7 MG/DL (ref 8.3–10.6)
CHLORIDE SERPL-SCNC: 105 MMOL/L (ref 99–110)
CO2 SERPL-SCNC: 27 MMOL/L (ref 21–32)
CREAT SERPL-MCNC: 1.2 MG/DL (ref 0.8–1.3)
GFR SERPLBLD CREATININE-BSD FMLA CKD-EPI: 59 ML/MIN/{1.73_M2}
GLUCOSE SERPL-MCNC: 175 MG/DL (ref 70–99)
MAGNESIUM SERPL-MCNC: 1.94 MG/DL (ref 1.8–2.4)
POTASSIUM SERPL-SCNC: 4.8 MMOL/L (ref 3.5–5.1)
SODIUM SERPL-SCNC: 141 MMOL/L (ref 136–145)

## 2024-11-19 PROCEDURE — 80048 BASIC METABOLIC PNL TOTAL CA: CPT

## 2024-11-19 PROCEDURE — 36415 COLL VENOUS BLD VENIPUNCTURE: CPT

## 2024-11-19 PROCEDURE — 83735 ASSAY OF MAGNESIUM: CPT

## 2024-11-19 RX ORDER — EZETIMIBE 10 MG/1
10 TABLET ORAL DAILY
COMMUNITY
End: 2024-11-20

## 2024-11-19 RX ORDER — ATORVASTATIN CALCIUM 40 MG/1
20 TABLET, FILM COATED ORAL DAILY
COMMUNITY

## 2024-11-19 RX ORDER — LISINOPRIL 20 MG/1
10 TABLET ORAL DAILY
COMMUNITY

## 2024-11-19 RX ORDER — HYDROCHLOROTHIAZIDE 25 MG/1
25 TABLET ORAL DAILY
COMMUNITY
End: 2024-11-20

## 2024-11-19 NOTE — TELEPHONE ENCOUNTER
MUSTAPHA  Pt states he is taking:    Lisinopril 10 mg 1 a day  Metformin 1000 mg 1 a day  Atorvastatin 20 mg 1 a day  Tamsulosin 40 mg 1 a day  Clopidogrel 75 mg 1 a day  Aspirin 81 mg 1 a day

## 2024-11-19 NOTE — PROGRESS NOTES
[facility]     Outpatient Follow Up Note    Mitchel Mckinney is 84 y.o. male who presents today with a history of CAD s/p CABG '02; s/p PTCA RCA '; HTN , hyperlipidemia and syncope ,  &  (Orthostatic).   His other hx includes: AAA s/p repair ', carotid stenosis s/p Lt CEA '    CHIEF COMPLAINT / HPI:  Follow Up secondary to CAD / HTN    Subjective:   He denies significant chest pain. There is SOB every now/then. The patient denies orthopnea/PND. The patient has swelling in both legs. The patients weight is stable . The patient is not experiencing palpitations . Every once in a while he'll get dizziness / light headed.   His last episode of passing out was in July when it was hot; the first time was a few months earlier. He denies recurrence    These symptoms show no change since the last OV.   With regard to medication therapy the patient has been compliant with prescribed regimen. They have tolerated therapy to date.     Past Medical History:   Diagnosis Date    Acquired spondylolisthesis 2014    Arthritis     diffuse    CAD (coronary artery disease)     S/P PCI and CABG    Carotid disease, bilateral (HCC) 2/3/2014    Cholelithiasis 11/10/2014    Chronic systolic heart failure (HCC)     Diabetes mellitus type II     Essential hypertension     Hyperlipidemia     Obesity (BMI 30.0-34.9) 2015    Prolonged emergence from general anesthesia     slow to wake up    Spinal stenosis of lumbar region 2014    Status post AAA (abdominal aortic aneurysm) repair 11/10/2014     Social History:    Social History     Tobacco Use   Smoking Status Former    Current packs/day: 0.00    Average packs/day: 3.0 packs/day for 15.0 years (45.0 ttl pk-yrs)    Types: Cigarettes    Start date: 1956    Quit date: 1971    Years since quittin.3   Smokeless Tobacco Never   Tobacco Comments    quit 40 years ago     Current Medications: cannot accurately confirm medications

## 2024-11-19 NOTE — PATIENT INSTRUCTIONS
Labs today    Call us with the names, doses and frequencies of the medications you are currently taking    Appt in six weeks

## 2024-11-20 ENCOUNTER — TELEPHONE (OUTPATIENT)
Dept: CARDIOLOGY CLINIC | Age: 84
End: 2024-11-20

## 2024-11-20 NOTE — TELEPHONE ENCOUNTER
List updated based on information below.     Maggy Beasley, CAROL - CNP  You51 minutes ago (1:46 PM)         Please update his med list . thanks

## 2025-01-02 ENCOUNTER — OFFICE VISIT (OUTPATIENT)
Dept: CARDIOLOGY CLINIC | Age: 85
End: 2025-01-02
Payer: MEDICARE

## 2025-01-02 VITALS
WEIGHT: 237 LBS | HEIGHT: 72 IN | SYSTOLIC BLOOD PRESSURE: 144 MMHG | BODY MASS INDEX: 32.1 KG/M2 | OXYGEN SATURATION: 98 % | DIASTOLIC BLOOD PRESSURE: 72 MMHG | HEART RATE: 67 BPM

## 2025-01-02 DIAGNOSIS — I50.22 CHRONIC SYSTOLIC HEART FAILURE (HCC): ICD-10-CM

## 2025-01-02 DIAGNOSIS — E78.2 MIXED HYPERLIPIDEMIA: ICD-10-CM

## 2025-01-02 DIAGNOSIS — I25.10 CORONARY ARTERY DISEASE INVOLVING NATIVE CORONARY ARTERY OF NATIVE HEART WITHOUT ANGINA PECTORIS: ICD-10-CM

## 2025-01-02 DIAGNOSIS — I10 ESSENTIAL HYPERTENSION, BENIGN: Primary | ICD-10-CM

## 2025-01-02 PROCEDURE — 1036F TOBACCO NON-USER: CPT | Performed by: NURSE PRACTITIONER

## 2025-01-02 PROCEDURE — 99214 OFFICE O/P EST MOD 30 MIN: CPT | Performed by: NURSE PRACTITIONER

## 2025-01-02 PROCEDURE — 1123F ACP DISCUSS/DSCN MKR DOCD: CPT | Performed by: NURSE PRACTITIONER

## 2025-01-02 PROCEDURE — 1159F MED LIST DOCD IN RCRD: CPT | Performed by: NURSE PRACTITIONER

## 2025-01-02 PROCEDURE — G8417 CALC BMI ABV UP PARAM F/U: HCPCS | Performed by: NURSE PRACTITIONER

## 2025-01-02 PROCEDURE — G8427 DOCREV CUR MEDS BY ELIG CLIN: HCPCS | Performed by: NURSE PRACTITIONER

## 2025-01-02 NOTE — PROGRESS NOTES
[facility]     Outpatient Follow Up Note    Mitchel Mckinney is 84 y.o. male who presents today with a history of CAD s/p CABG '02; s/p PTCA RCA '22; HTN , hyperlipidemia and syncope Nov '20, Jun & July '24 (Orthostatic).   His other hx includes: AAA s/p repair '09, carotid stenosis s/p Lt CEA '06    CHIEF COMPLAINT / HPI:  Follow Up secondary to HTN     Subjective:   Every now/then his Rt shoulder/neck gets tired and painful. Laying down at night hurts so he has to roll over on his back. He hasn't tried taking anything for relief. It comes/goes but ongoing for quite a while. It goes away after relaxing / napping.    He denies significant chest pain. There is SOB every now/then. The patient denies orthopnea/PND. The patient has swelling in both legs gary in the right (SVG). They really don't get too bad. The patients weight is stable . The patient is not experiencing palpitations.    Three weeks ago he leaned over to work on his sink. He got dizzy and hit his head on the cabinet doors   His last episode of passing out was in July when it was hot; the first time was a few months earlier. He denies recurrence    These symptoms show no change since the last OV.   With regard to medication therapy the patient has been compliant with prescribed regimen. They have tolerated therapy to date.     Past Medical History:   Diagnosis Date    Acquired spondylolisthesis 1/29/2014    Arthritis     diffuse    CAD (coronary artery disease)     S/P PCI and CABG    Carotid disease, bilateral (HCC) 2/3/2014    Cholelithiasis 11/10/2014    Chronic systolic heart failure (HCC)     Diabetes mellitus type II     Essential hypertension     Hyperlipidemia     Obesity (BMI 30.0-34.9) 2/27/2015    Prolonged emergence from general anesthesia     slow to wake up    Spinal stenosis of lumbar region 1/29/2014    Status post AAA (abdominal aortic aneurysm) repair 11/10/2014     Social History:    Social History     Tobacco Use   Smoking

## 2025-04-08 NOTE — PROGRESS NOTES
[facility]   Cardiac Follow Up     Referring Provider:  Joe Gerber MD     Chief Complaint   Patient presents with    6 Month Follow-Up    Dizziness    Coronary Artery Disease    Hypertension    Hyperlipidemia        History of Present Illness:  Mitchel Mckinney is a 84 y.o. male with a history of CAD/ s/p CABG 02/ hypertension/ and hyperlipidemia AAA s/p repair 2009 and carotid stenosis s/p LCEA 2006.    ED 11/22/2020 with complaint of a syncopal episode.    He had carpal tunnel surgery to his right hand the week prior.  Patient states he woke up in the morning he went downstairs to make coffee.  States he  made coffee was going to sit down in his chair when apparently he had a syncopal episode.  Patient states he woke up on the ground, the chair was broken and he had a contusion to the back of his head. He just felt dizzy and then next found himself on the floor.  He did not have any sweating or hot flashes. He was discharged home on 11/24/2020 with a 14 day holter monitor.      Pt was admitted to hospital July 2022 for ACS.  LHC with grafts per Dr Soares on July 13, 2022.PCI of RCA    1/13/24 Demetri was seen in ER for chest pain >  underwent LHC which did not reveal any significant ischemic culprits.     6/9/24 presented emergency room due to syncopal event that occurred earlier that day while at [employer]. States that he was sitting in a chair and fell to the side. He was staring off into space and was not responsive to the people around him evaluated at the time. He had a reassuring neuroexam with any focal neurological deficits. Troponin x 2 was normal. EKG read as normal sinus rhythm chest x-ray did not show any acute cardiopulmonary normalities.     He presented to [facility] on 7/24/2024 flulike symptoms.  He was found to have COVID-19.  He had an unsteady gait and was dizzy with presyncope, so he is admitted for observation and CVA rule out.  CT scan of his head was negative for acute

## 2025-04-25 ENCOUNTER — OFFICE VISIT (OUTPATIENT)
Dept: CARDIOLOGY CLINIC | Age: 85
End: 2025-04-25
Payer: MEDICARE

## 2025-04-25 VITALS
BODY MASS INDEX: 33.38 KG/M2 | HEART RATE: 83 BPM | DIASTOLIC BLOOD PRESSURE: 82 MMHG | OXYGEN SATURATION: 97 % | WEIGHT: 238.4 LBS | HEIGHT: 71 IN | SYSTOLIC BLOOD PRESSURE: 156 MMHG

## 2025-04-25 DIAGNOSIS — I65.23 BILATERAL CAROTID ARTERY STENOSIS: ICD-10-CM

## 2025-04-25 DIAGNOSIS — R06.02 SHORTNESS OF BREATH: ICD-10-CM

## 2025-04-25 DIAGNOSIS — R55 SYNCOPE AND COLLAPSE: ICD-10-CM

## 2025-04-25 DIAGNOSIS — Z95.1 S/P CABG (CORONARY ARTERY BYPASS GRAFT): ICD-10-CM

## 2025-04-25 DIAGNOSIS — E78.2 MIXED HYPERLIPIDEMIA: ICD-10-CM

## 2025-04-25 DIAGNOSIS — Z98.890 STATUS POST AAA (ABDOMINAL AORTIC ANEURYSM) REPAIR: ICD-10-CM

## 2025-04-25 DIAGNOSIS — Z86.79 STATUS POST AAA (ABDOMINAL AORTIC ANEURYSM) REPAIR: ICD-10-CM

## 2025-04-25 DIAGNOSIS — I10 ESSENTIAL HYPERTENSION, BENIGN: ICD-10-CM

## 2025-04-25 DIAGNOSIS — I25.10 CORONARY ARTERY DISEASE INVOLVING NATIVE CORONARY ARTERY OF NATIVE HEART WITHOUT ANGINA PECTORIS: Primary | ICD-10-CM

## 2025-04-25 PROCEDURE — G8427 DOCREV CUR MEDS BY ELIG CLIN: HCPCS | Performed by: INTERNAL MEDICINE

## 2025-04-25 PROCEDURE — 1036F TOBACCO NON-USER: CPT | Performed by: INTERNAL MEDICINE

## 2025-04-25 PROCEDURE — G8417 CALC BMI ABV UP PARAM F/U: HCPCS | Performed by: INTERNAL MEDICINE

## 2025-04-25 PROCEDURE — 1123F ACP DISCUSS/DSCN MKR DOCD: CPT | Performed by: INTERNAL MEDICINE

## 2025-04-25 PROCEDURE — 1159F MED LIST DOCD IN RCRD: CPT | Performed by: INTERNAL MEDICINE

## 2025-04-25 PROCEDURE — 99214 OFFICE O/P EST MOD 30 MIN: CPT | Performed by: INTERNAL MEDICINE

## 2025-04-25 RX ORDER — LISINOPRIL 20 MG/1
20 TABLET ORAL DAILY
Qty: 90 TABLET | Refills: 3 | Status: SHIPPED | OUTPATIENT
Start: 2025-04-25

## 2025-04-25 NOTE — PATIENT INSTRUCTIONS
Echocardiogram  No medication changes  Continue risk factor modifications.   Call for any change in symptoms, call to report any changes in shortness of breath or development of chest pain with activity.    Follow up in 6 mos

## 2025-05-08 ENCOUNTER — RESULTS FOLLOW-UP (OUTPATIENT)
Dept: CARDIOLOGY CLINIC | Age: 85
End: 2025-05-08

## 2025-05-08 ENCOUNTER — HOSPITAL ENCOUNTER (OUTPATIENT)
Age: 85
Discharge: HOME | End: 2025-05-10
Attending: INTERNAL MEDICINE
Payer: MEDICARE

## 2025-05-08 VITALS
SYSTOLIC BLOOD PRESSURE: 178 MMHG | DIASTOLIC BLOOD PRESSURE: 91 MMHG | BODY MASS INDEX: 33.32 KG/M2 | HEIGHT: 71 IN | WEIGHT: 238 LBS

## 2025-05-08 DIAGNOSIS — R06.02 SHORTNESS OF BREATH: ICD-10-CM

## 2025-05-08 LAB
ECHO AO ASC DIAM: 5.1 CM
ECHO AO ASCENDING AORTA INDEX: 2.25 CM/M2
ECHO AO ROOT DIAM: 4.5 CM
ECHO AO ROOT INDEX: 1.98 CM/M2
ECHO AV AREA PEAK VELOCITY: 2.7 CM2
ECHO AV AREA VTI: 3 CM2
ECHO AV AREA/BSA PEAK VELOCITY: 1.2 CM2/M2
ECHO AV AREA/BSA VTI: 1.3 CM2/M2
ECHO AV MEAN GRADIENT: 3 MMHG
ECHO AV MEAN VELOCITY: 0.9 M/S
ECHO AV PEAK GRADIENT: 6 MMHG
ECHO AV PEAK VELOCITY: 1.2 M/S
ECHO AV VELOCITY RATIO: 0.67
ECHO AV VTI: 27.4 CM
ECHO BSA: 2.33 M2
ECHO EST RA PRESSURE: 3 MMHG
ECHO IVC INSP: 0.7 CM
ECHO IVC PROX: 1.8 CM
ECHO LA AREA 2C: 19.9 CM2
ECHO LA AREA 4C: 22 CM2
ECHO LA MAJOR AXIS: 6.6 CM
ECHO LA MINOR AXIS: 5.7 CM
ECHO LA VOL BP: 62 ML (ref 18–58)
ECHO LA VOL MOD A2C: 56 ML (ref 18–58)
ECHO LA VOL MOD A4C: 59 ML (ref 18–58)
ECHO LA VOL/BSA BIPLANE: 27 ML/M2 (ref 16–34)
ECHO LA VOLUME INDEX MOD A2C: 25 ML/M2 (ref 16–34)
ECHO LA VOLUME INDEX MOD A4C: 26 ML/M2 (ref 16–34)
ECHO LV E' LATERAL VELOCITY: 14.5 CM/S
ECHO LV E' SEPTAL VELOCITY: 5.91 CM/S
ECHO LV EDV A2C: 129 ML
ECHO LV EDV A4C: 146 ML
ECHO LV EDV INDEX A4C: 64 ML/M2
ECHO LV EDV NDEX A2C: 57 ML/M2
ECHO LV EF PHYSICIAN: 43 %
ECHO LV EJECTION FRACTION A2C: 39 %
ECHO LV EJECTION FRACTION A4C: 43 %
ECHO LV EJECTION FRACTION BIPLANE: 41 % (ref 55–100)
ECHO LV ESV A2C: 79 ML
ECHO LV ESV A4C: 83 ML
ECHO LV ESV INDEX A2C: 35 ML/M2
ECHO LV ESV INDEX A4C: 37 ML/M2
ECHO LV FRACTIONAL SHORTENING: 13 % (ref 28–44)
ECHO LV INTERNAL DIMENSION DIASTOLE INDEX: 2.38 CM/M2
ECHO LV INTERNAL DIMENSION DIASTOLIC: 5.4 CM (ref 4.2–5.9)
ECHO LV INTERNAL DIMENSION SYSTOLIC INDEX: 2.07 CM/M2
ECHO LV INTERNAL DIMENSION SYSTOLIC: 4.7 CM
ECHO LV IVSD: 1.3 CM (ref 0.6–1)
ECHO LV MASS 2D: 295.6 G (ref 88–224)
ECHO LV MASS INDEX 2D: 130.2 G/M2 (ref 49–115)
ECHO LV POSTERIOR WALL DIASTOLIC: 1.3 CM (ref 0.6–1)
ECHO LV RELATIVE WALL THICKNESS RATIO: 0.48
ECHO LVOT AREA: 4.2 CM2
ECHO LVOT AV VTI INDEX: 0.72
ECHO LVOT DIAM: 2.3 CM
ECHO LVOT MEAN GRADIENT: 1 MMHG
ECHO LVOT PEAK GRADIENT: 3 MMHG
ECHO LVOT PEAK VELOCITY: 0.8 M/S
ECHO LVOT STROKE VOLUME INDEX: 35.9 ML/M2
ECHO LVOT SV: 81.4 ML
ECHO LVOT VTI: 19.6 CM
ECHO MV A VELOCITY: 0.61 M/S
ECHO MV E VELOCITY: 0.51 M/S
ECHO MV E/A RATIO: 0.84
ECHO MV E/E' LATERAL: 3.52
ECHO MV E/E' RATIO (AVERAGED): 6.07
ECHO MV E/E' SEPTAL: 8.63
ECHO PULMONARY ARTERY END DIASTOLIC PRESSURE: 14 MMHG
ECHO PV MAX VELOCITY: 1.2 M/S
ECHO PV PEAK GRADIENT: 6 MMHG
ECHO PV REGURGITANT MAX VELOCITY: 1.9 M/S
ECHO RA AREA 4C: 19.1 CM2
ECHO RA END SYSTOLIC VOLUME APICAL 4 CHAMBER INDEX BSA: 23 ML/M2
ECHO RA VOLUME: 53 ML
ECHO RV BASAL DIMENSION: 4.2 CM
ECHO RV FREE WALL PEAK S': 8.9 CM/S
ECHO RV LONGITUDINAL DIMENSION: 8.2 CM
ECHO RV MID DIMENSION: 2.9 CM
ECHO RV TAPSE: 1.1 CM (ref 1.7–?)

## 2025-05-08 PROCEDURE — 93306 TTE W/DOPPLER COMPLETE: CPT

## 2025-05-11 ENCOUNTER — PATIENT MESSAGE (OUTPATIENT)
Dept: FAMILY MEDICINE CLINIC | Age: 85
End: 2025-05-11

## 2025-05-20 NOTE — TELEPHONE ENCOUNTER
----- Message from Dr. Aquiles Foley MD sent at 5/19/2025  8:11 AM EDT -----  Have him repeat echo in 6 months

## 2025-06-03 DIAGNOSIS — R06.02 SHORTNESS OF BREATH: Primary | ICD-10-CM

## 2025-06-05 ENCOUNTER — TELEPHONE (OUTPATIENT)
Dept: VASCULAR SURGERY | Age: 85
End: 2025-06-05

## 2025-06-05 NOTE — TELEPHONE ENCOUNTER
Attempted to LM to schedule CDS and AAA in the [facility] office, after 7/25. MAILBOX WAS FULL. No appt needed with Dr. Bueno. Patient will be contacted with results. WILL NEED 2 TIME SLOTS FOR TESTING. NEEDS TO FAST 4 HOURS PRIOR!

## 2025-06-09 DIAGNOSIS — E11.59 TYPE 2 DIABETES MELLITUS WITH VASCULAR DISEASE (HCC): ICD-10-CM

## 2025-06-10 RX ORDER — TAMSULOSIN HYDROCHLORIDE 0.4 MG/1
CAPSULE ORAL
Qty: 30 CAPSULE | Refills: 0 | Status: SHIPPED | OUTPATIENT
Start: 2025-06-10

## 2025-06-10 NOTE — TELEPHONE ENCOUNTER
Medication:   Requested Prescriptions     Pending Prescriptions Disp Refills    metFORMIN (GLUCOPHAGE) 1000 MG tablet 90 tablet 1     Sig: Take 1 tablet by mouth nightly    tamsulosin (FLOMAX) 0.4 MG capsule 60 capsule 3     Sig: TAKE 1 CAPSULE EVERY DAY       Last Filled:  12/06/2023    Patient Phone Number: [phone] (home)     Last appt: 8/7/2024   Next appt: Visit date not found    Last Labs DM:   Lab Results   Component Value Date/Time    LABA1C 6.8 07/25/2024 04:58 AM

## 2025-06-12 ENCOUNTER — TELEPHONE (OUTPATIENT)
Dept: VASCULAR SURGERY | Age: 85
End: 2025-06-12

## 2025-06-19 ENCOUNTER — OFFICE VISIT (OUTPATIENT)
Dept: FAMILY MEDICINE CLINIC | Age: 85
End: 2025-06-19

## 2025-06-19 VITALS
HEART RATE: 82 BPM | HEIGHT: 71 IN | WEIGHT: 235 LBS | BODY MASS INDEX: 32.9 KG/M2 | DIASTOLIC BLOOD PRESSURE: 82 MMHG | TEMPERATURE: 97.9 F | OXYGEN SATURATION: 93 % | SYSTOLIC BLOOD PRESSURE: 180 MMHG

## 2025-06-19 DIAGNOSIS — I10 HYPERTENSION, UNSPECIFIED TYPE: ICD-10-CM

## 2025-06-19 DIAGNOSIS — I24.9 ACUTE CORONARY SYNDROME (HCC): ICD-10-CM

## 2025-06-19 DIAGNOSIS — Z00.00 MEDICARE ANNUAL WELLNESS VISIT, SUBSEQUENT: Primary | ICD-10-CM

## 2025-06-19 DIAGNOSIS — I50.22 CHRONIC SYSTOLIC HEART FAILURE (HCC): ICD-10-CM

## 2025-06-19 DIAGNOSIS — E11.59 TYPE 2 DIABETES MELLITUS WITH VASCULAR DISEASE (HCC): ICD-10-CM

## 2025-06-19 DIAGNOSIS — N18.31 STAGE 3A CHRONIC KIDNEY DISEASE (HCC): ICD-10-CM

## 2025-06-19 DIAGNOSIS — Z00.00 MEDICARE ANNUAL WELLNESS VISIT, SUBSEQUENT: ICD-10-CM

## 2025-06-19 LAB
ALBUMIN SERPL-MCNC: 4.3 G/DL (ref 3.4–5)
ALBUMIN/GLOB SERPL: 1.6 {RATIO} (ref 1.1–2.2)
ALP SERPL-CCNC: 143 U/L (ref 40–129)
ALT SERPL-CCNC: 16 U/L (ref 10–40)
ANION GAP SERPL CALCULATED.3IONS-SCNC: 11 MMOL/L (ref 3–16)
AST SERPL-CCNC: 22 U/L (ref 15–37)
BASOPHILS # BLD: 0 K/UL (ref 0–0.2)
BASOPHILS NFR BLD: 0.7 %
BILIRUB SERPL-MCNC: 0.8 MG/DL (ref 0–1)
BUN SERPL-MCNC: 25 MG/DL (ref 7–20)
CALCIUM SERPL-MCNC: 9.7 MG/DL (ref 8.3–10.6)
CHLORIDE SERPL-SCNC: 105 MMOL/L (ref 99–110)
CO2 SERPL-SCNC: 25 MMOL/L (ref 21–32)
CREAT SERPL-MCNC: 1.1 MG/DL (ref 0.8–1.3)
DEPRECATED RDW RBC AUTO: 14.3 % (ref 12.4–15.4)
EOSINOPHIL # BLD: 0.2 K/UL (ref 0–0.6)
EOSINOPHIL NFR BLD: 3.8 %
GFR SERPLBLD CREATININE-BSD FMLA CKD-EPI: 65 ML/MIN/{1.73_M2}
GLUCOSE SERPL-MCNC: 122 MG/DL (ref 70–99)
HBA1C MFR BLD: 7.3 %
HCT VFR BLD AUTO: 45.8 % (ref 40.5–52.5)
HGB BLD-MCNC: 15.4 G/DL (ref 13.5–17.5)
LYMPHOCYTES # BLD: 1.8 K/UL (ref 1–5.1)
LYMPHOCYTES NFR BLD: 29.6 %
MCH RBC QN AUTO: 32.3 PG (ref 26–34)
MCHC RBC AUTO-ENTMCNC: 33.6 G/DL (ref 31–36)
MCV RBC AUTO: 96.1 FL (ref 80–100)
MONOCYTES # BLD: 0.4 K/UL (ref 0–1.3)
MONOCYTES NFR BLD: 6.4 %
NEUTROPHILS # BLD: 3.5 K/UL (ref 1.7–7.7)
NEUTROPHILS NFR BLD: 59.5 %
PLATELET # BLD AUTO: 146 K/UL (ref 135–450)
PMV BLD AUTO: 10.4 FL (ref 5–10.5)
POTASSIUM SERPL-SCNC: 4.4 MMOL/L (ref 3.5–5.1)
PROT SERPL-MCNC: 7 G/DL (ref 6.4–8.2)
RBC # BLD AUTO: 4.77 M/UL (ref 4.2–5.9)
SODIUM SERPL-SCNC: 141 MMOL/L (ref 136–145)
WBC # BLD AUTO: 5.9 K/UL (ref 4–11)

## 2025-06-19 RX ORDER — AMLODIPINE BESYLATE 10 MG/1
10 TABLET ORAL DAILY
Qty: 90 TABLET | Refills: 1 | Status: SHIPPED | OUTPATIENT
Start: 2025-06-19

## 2025-06-19 SDOH — ECONOMIC STABILITY: FOOD INSECURITY: WITHIN THE PAST 12 MONTHS, THE FOOD YOU BOUGHT JUST DIDN'T LAST AND YOU DIDN'T HAVE MONEY TO GET MORE.: NEVER TRUE

## 2025-06-19 SDOH — ECONOMIC STABILITY: FOOD INSECURITY: WITHIN THE PAST 12 MONTHS, YOU WORRIED THAT YOUR FOOD WOULD RUN OUT BEFORE YOU GOT MONEY TO BUY MORE.: NEVER TRUE

## 2025-06-19 ASSESSMENT — PATIENT HEALTH QUESTIONNAIRE - PHQ9
SUM OF ALL RESPONSES TO PHQ QUESTIONS 1-9: 0
2. FEELING DOWN, DEPRESSED OR HOPELESS: NOT AT ALL
1. LITTLE INTEREST OR PLEASURE IN DOING THINGS: NOT AT ALL
SUM OF ALL RESPONSES TO PHQ QUESTIONS 1-9: 0

## 2025-06-19 ASSESSMENT — LIFESTYLE VARIABLES
HOW MANY STANDARD DRINKS CONTAINING ALCOHOL DO YOU HAVE ON A TYPICAL DAY: PATIENT DOES NOT DRINK
HOW OFTEN DO YOU HAVE A DRINK CONTAINING ALCOHOL: NEVER

## 2025-06-19 NOTE — PROGRESS NOTES
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     Subjective   The following acute and/or chronic problems were also addressed today:  Last Tdap 2018.    Patient has history of type 2 diabetes.  Currently takes metformin.  Denies polyuria/polydipsia.  Denies vision change. Continue statin.     Patient has history of hypertension.  Takes lisinopril.  Denies chest pain/shortness of breath. No HA or dizzines. BP is above goal. Will add Amlodipine to his regimen. F/u in 2 weeks for BP check.    Pt has hx of CHF and CAD s/p stents and CABG. Takes Plavix. Is asymptomatic. Recommend f/u with cardiology as scheduled.     Pt has hx of CKD. Will check BMP. Avoid nephrotoxic meds.     Patient's complete Health Risk Assessment and screening values have been reviewed and are found in Flowsheets. The following problems were reviewed today and where indicated follow up appointments were made and/or referrals ordered.    Positive Risk Factor Screenings with Interventions:    Fall Risk:  Do you feel unsteady or are you worried about falling? : (!) yes  2 or more falls in past year?: no  Fall with injury in past year?: no     Interventions:    Reviewed medications, home hazards, visual acuity, and co-morbidities that can increase risk for falls  Discussed gradually increasing activity and weight loss               Abnormal BMI (obese):  Body mass index is 32.78 kg/m². (!) Abnormal  Interventions:  Discussed healthy diet and weight loss     Hearing Screen:  Do you or your family notice any trouble with your hearing that hasn't been managed with hearing

## 2025-06-20 ENCOUNTER — RESULTS FOLLOW-UP (OUTPATIENT)
Dept: FAMILY MEDICINE CLINIC | Age: 85
End: 2025-06-20

## 2025-07-23 DIAGNOSIS — E11.59 TYPE 2 DIABETES MELLITUS WITH VASCULAR DISEASE (HCC): ICD-10-CM

## 2025-07-23 RX ORDER — TAMSULOSIN HYDROCHLORIDE 0.4 MG/1
0.4 CAPSULE ORAL DAILY
Qty: 90 CAPSULE | Refills: 3 | Status: SHIPPED | OUTPATIENT
Start: 2025-07-23

## 2025-07-23 RX ORDER — ATORVASTATIN CALCIUM 40 MG/1
40 TABLET, FILM COATED ORAL DAILY
Qty: 90 TABLET | Refills: 3 | Status: SHIPPED | OUTPATIENT
Start: 2025-07-23

## 2025-07-23 NOTE — TELEPHONE ENCOUNTER
Medication:   Requested Prescriptions     Pending Prescriptions Disp Refills    metFORMIN (GLUCOPHAGE) 1000 MG tablet [Pharmacy Med Name: METFORMIN HYDROCHLORIDE 1000 MG Oral Tablet] 30 tablet 11     Sig: TAKE 1 TABLET EVERY NIGHT    tamsulosin (FLOMAX) 0.4 MG capsule [Pharmacy Med Name: TAMSULOSIN HYDROCHLORIDE 0.4 MG Oral Capsule] 30 capsule 11     Sig: TAKE 1 CAPSULE EVERY DAY       Last Filled:  6/10/2025  6/10/2025    Patient Phone Number: [phone] (home)     Last appt: 6/19/2025   Next appt: 7/23/2025    Last Labs DM:   Lab Results   Component Value Date/Time    LABA1C 7.3 06/19/2025 09:23 AM

## 2025-07-23 NOTE — TELEPHONE ENCOUNTER
Medication:   Requested Prescriptions     Pending Prescriptions Disp Refills    atorvastatin (LIPITOR) 40 MG tablet [Pharmacy Med Name: ATORVASTATIN 40MG TAB]  0       Last Filled:      Patient Phone Number: [phone] (home)     Last appt: 6/19/2025   Next appt: Visit date not found    Last Lipid:   Lab Results   Component Value Date/Time    CHOL 137 07/25/2024 04:58 AM    TRIG 73 07/25/2024 04:58 AM    HDL 35 07/25/2024 04:58 AM    HDL 36 11/21/2011 06:40 AM

## (undated) DEVICE — 3M™ STERI-DRAPE™ U-DRAPE 1015: Brand: STERI-DRAPE™

## (undated) DEVICE — SYRINGE IRRIG 60ML SFT PLIABLE BLB EZ TO GRP 1 HND USE W/

## (undated) DEVICE — Z INACTIVE NO SUPPLIER SOLUTIONIRRIG 3000ML 0.9% SOD CHL FLX CONT [79720808] [HOSPIRA WORLDWIDE INC]

## (undated) DEVICE — DECANTER BAG 9": Brand: MEDLINE INDUSTRIES, INC.

## (undated) DEVICE — SYSTEM SKIN CLSR 22CM DERMBND PRINEO

## (undated) DEVICE — Z CONVERTED USE 2271043 CONTAINER SPEC COLL 4OZ SCR ON LID PEEL PCH

## (undated) DEVICE — SUTURE VCRL SZ 2-0 L18IN ABSRB UD CT-1 L36MM 1/2 CIR J839D

## (undated) DEVICE — HANDPIECE SUCTION TUBING INTERPULSE 10FT

## (undated) DEVICE — COAXIAL HIGH FLOW TIP WITH SOFT SHIELD

## (undated) DEVICE — GLOVE 6 LTX ST BIOGEL M PF BEAD CUFF

## (undated) DEVICE — APPLICATOR PREP 26ML 0.7% IOD POVACRYLEX 74% ISO ALC ST

## (undated) DEVICE — STERILE PVP: Brand: MEDLINE INDUSTRIES, INC.

## (undated) DEVICE — BIPOLAR SEALER 23-112-1 AQM 6.0: Brand: AQUAMANTYS ®

## (undated) DEVICE — SUTURE STRATAFIX SPRL SZ 1 L14IN ABSRB VLT L48CM CTX 1/2 SXPD2B405

## (undated) DEVICE — SURE SET-DOUBLE BASIN-LF: Brand: MEDLINE INDUSTRIES, INC.

## (undated) DEVICE — E-Z CLEAN, NON-STICK, PTFE COATED, ELECTROSURGICAL BLADE ELECTRODE, 4 INCH (10.2 CM): Brand: MEGADYNE

## (undated) DEVICE — SUTURE ETHBND EXCEL SZ 5 L30IN NONABSORBABLE GRN L40MM V-37 MB66G

## (undated) DEVICE — IMPLANTABLE DEVICE: Type: IMPLANTABLE DEVICE | Site: HIP | Status: NON-FUNCTIONAL

## (undated) DEVICE — TURNOVER KIT RM INF CTRL TECH

## (undated) DEVICE — 3M™ WARMING BLANKET, UPPER BODY, 10 PER CASE, 42268: Brand: BAIR HUGGER™

## (undated) DEVICE — ORTHO PRE OP PACK: Brand: MEDLINE INDUSTRIES, INC.

## (undated) DEVICE — NEEDLE SPNL L3.5IN PNK HUB S STL REG WALL FIT STYL W/ QNCKE

## (undated) DEVICE — SYRINGE MED 30ML STD CLR PLAS LUERLOCK TIP N CTRL DISP

## (undated) DEVICE — PACK PROCEDURE SURG TOT HIP

## (undated) DEVICE — GOWN,SIRUS,POLYRNF,BRTHSLV,XL,30/CS: Brand: MEDLINE

## (undated) DEVICE — SUTURE VCRL SZ 0 L18IN ABSRB UD L36MM CT-1 1/2 CIR J840D

## (undated) DEVICE — PROTECTOR ULN NRV PUR FOAM HK LOOP STRP ANATOMICALLY

## (undated) DEVICE — GLOVE SURG SZ 85 L12IN FNGR THK13MIL BRN LTX SYN POLYMER W

## (undated) DEVICE — PEEL-AWAY HOOD: Brand: FLYTE, SURGICOOL

## (undated) DEVICE — GLOVE SURG SZ 6 L12IN FNGR THK79MIL GRN LTX FREE

## (undated) DEVICE — PRE OP PACK: Brand: MEDLINE INDUSTRIES, INC.

## (undated) DEVICE — MARKER SURG SKIN UTIL BLK REG TIP NONSMEARING W/ 6IN RUL

## (undated) DEVICE — GARMENT,MEDLINE,DVT,INT,CALF,MED, GEN2: Brand: MEDLINE

## (undated) DEVICE — SOLUTION IV 1000ML 0.9% SOD CHL

## (undated) DEVICE — SPONGE,LAP,18"X18",DLX,XR,ST,5/PK,40/PK: Brand: MEDLINE

## (undated) DEVICE — NEEDLE 1/2 CIR SZ 2 SURG MAYO CATGUT

## (undated) DEVICE — PLATE ES AD W 9FT CRD 2

## (undated) DEVICE — GLOVE SURG SZ 85 L12IN FNGR THK79MIL GRN LTX FREE

## (undated) DEVICE — SST TWIST DRILL, STANDARD, 4MM DIA. X 127MM: Brand: MICROAIRE®

## (undated) DEVICE — COUNTER NDL 40 COUNT HLD 70 NUM FOAM BLK SGL MAG W BLDE REMV

## (undated) DEVICE — STAPLER SKIN H3.9MM WIRE DIA0.58MM CRWN 6.9MM 35 STPL FIX